# Patient Record
Sex: FEMALE | Race: BLACK OR AFRICAN AMERICAN | NOT HISPANIC OR LATINO | Employment: UNEMPLOYED
[De-identification: names, ages, dates, MRNs, and addresses within clinical notes are randomized per-mention and may not be internally consistent; named-entity substitution may affect disease eponyms.]

---
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
## 2022-02-08 ENCOUNTER — E-VISIT (OUTPATIENT)
Dept: FAMILY MEDICINE | Facility: CLINIC | Age: 22
End: 2022-02-08
Payer: COMMERCIAL

## 2022-02-08 DIAGNOSIS — J02.9 SORE THROAT: ICD-10-CM

## 2022-02-08 DIAGNOSIS — Z20.822 SUSPECTED COVID-19 VIRUS INFECTION: ICD-10-CM

## 2022-02-08 PROCEDURE — 99207 PR NO CHARGE LOS: CPT | Performed by: FAMILY MEDICINE

## 2022-02-09 PROBLEM — J30.1 SEASONAL ALLERGIC RHINITIS DUE TO POLLEN: Status: ACTIVE | Noted: 2017-06-28

## 2022-02-09 PROBLEM — F33.1 MODERATE EPISODE OF RECURRENT MAJOR DEPRESSIVE DISORDER (H): Status: ACTIVE | Noted: 2020-04-15

## 2022-02-09 PROBLEM — F32.2 CURRENT SEVERE EPISODE OF MAJOR DEPRESSIVE DISORDER WITHOUT PSYCHOTIC FEATURES (H): Status: ACTIVE | Noted: 2019-01-10

## 2022-02-09 RX ORDER — ARIPIPRAZOLE 20 MG/1
20 TABLET ORAL DAILY
COMMUNITY
Start: 2021-05-25 | End: 2022-04-26

## 2022-02-09 RX ORDER — OMEPRAZOLE 20 MG/1
TABLET, DELAYED RELEASE ORAL
COMMUNITY
End: 2022-10-27

## 2022-02-09 RX ORDER — LAMOTRIGINE 150 MG/1
150 TABLET ORAL DAILY
COMMUNITY
Start: 2021-07-30 | End: 2022-04-26

## 2022-02-09 RX ORDER — ATOMOXETINE 40 MG/1
40 CAPSULE ORAL EVERY MORNING
COMMUNITY
Start: 2021-10-19 | End: 2022-04-26

## 2022-02-09 RX ORDER — CETIRIZINE HYDROCHLORIDE 10 MG/1
10 TABLET ORAL DAILY
Qty: 30 TABLET | Refills: 0 | Status: SHIPPED | OUTPATIENT
Start: 2022-02-09 | End: 2022-04-26

## 2022-02-09 RX ORDER — FLUTICASONE PROPIONATE 50 MCG
2 SPRAY, SUSPENSION (ML) NASAL DAILY
Qty: 10 ML | Refills: 0 | Status: SHIPPED | OUTPATIENT
Start: 2022-02-09 | End: 2022-04-26

## 2022-02-09 NOTE — PATIENT INSTRUCTIONS
Z,    Your symptoms show that you may have coronavirus (COVID-19). This illness can cause fever, cough and trouble breathing. Many people get a mild case and get better on their own. Some people can get very sick.    Because you reported additional symptoms, I would like to also test you for flu,covid and strep.    What should I do?  We would like to test you for COVID-19 virus and Strep and Flu. I have placed orders for these tests. To schedule: go to your [employer] home page and scroll down to the section that says  You have an appointment that needs to be scheduled  and click the large green button that says  Schedule Now  and follow the steps to find the next available openings. It is important that when you are asked what the reason for your appointment is that you mention you need BOTH COVID and Flu/strep tests.    If you are unable to complete these [employer] scheduling steps, please call [phone] to schedule your testing.     Return to work/school/ guidance:   Please let your workplace manager and staffing office know when your isolation ends.       If you receive a positive COVID-19 test result, follow the guidance of the those who are giving you the results. Usually the return to work is 10 days from symptom onset or positive test date, whichever comes first (or in some cases 20 days if you are immunocompromised). If your symptoms started after your positive test, the 10 days should start when your symptoms started.   o If you work at [facility], you must also be cleared by Employee Occupational Health and Safety to return to work.      If you receive a negative COVID-19 test result and did not have a high risk exposure to someone with a known positive COVID-19 test, you can return to work once you're free of fever for 24 hours without fever-reducing medication and your symptoms are improving or resolved.    If you receive a negative COVID-19 test and if you had a high risk exposure to  someone who has tested positive for COVID-19 then you can return to work 14 days after your last contact with the positive individual. Follow quarantine guidance given by your doctor or public health officials.    Sign up for [employer].   We know it's scary to hear that you might have COVID-19. We want to track your symptoms to make sure you're okay over the next 2 weeks. Please look for an email from [employer]--this is a free, online program that we'll use to keep in touch. To sign up, follow the link in the email you will receive. Learn more at [email or web address]    How can I take care of myself?  Over the counter medications may help with your symptoms like congestion, cough, chills, or fever. I have sent in a prescription for Zyrtec and flonase.    There are not many effective prescription treatments for early COVID-19. Hydroxychloroquine, ivermectin, and azithromycin are not effective or recommended for COVID-19.    If your symptoms started in the last 10 days, you may be able to receive a treatment with monoclonal antibodies. This treatment can lower your risk of severe illness and going to the hospital. It is given through an IV or under your skin (subcutaneous) and must be given at an infusion center. You must be 12 or older, weight at least 88 pounds, and have a positive COVID-19 test.      If you would like to sign up to be considered to receive the monoclonal antibody medicine, please complete a participation form through the [facility] here:  MNRAP ([email or web address].[facility].mn.us/diseases/coronavirus/mnrap.html). You may also call the [facility] COVID-19 Public Hotline at 1-501.233.4557 (open Mon-Fri: 9am-7pm and Sat: 10am-6pm).     Not all people who are eligible will receive the medicine, since supply is limited. You will be contacted in the next 1 to 2 business days only if you are selected. If you do not receive a call, you have not been selected to receive the  medicine. If you have any questions about this medication, please contact your primary care provider. For more information, see [email or web address].[facility].[email or web address]/diseases/[email or web address]/[email or web address]      Get lots of rest. Drink extra fluids (unless a doctor has told you not to)    Take Tylenol (acetaminophen) or ibuprofen for fever or pain. If you have liver or kidney problems, ask your family doctor if it's okay to take Tylenol or ibuprofen    Take over the counter medications for your symptoms, as directed by your doctor. You may also talk to your pharmacist.      If you have other health problems (like cancer, heart failure, an organ transplant or severe kidney disease): Call your specialty clinic if you don't feel better in the next 2 days.    Know when to call 911. Emergency warning signs include:  o Trouble breathing or shortness of breath  o Pain or pressure in the chest that doesn't go away  o Feeling confused like you haven't felt before, or not being able to wake up  o Bluish-colored lips or face    Where can I get more information?    [facility] - About COVID-19: [email or web address]     CDC - What to Do If You're Sick:   www.cdc.gov/coronavirus/2019-ncov/about/steps-when-sick.html    CDC - Ending Home Isolation:  https://www.cdc.gov/coronavirus/2019-ncov/your-health/quarantine-isolation.html    CDC - Caring for Someone:  www.cdc.gov/coronavirus/2019-ncov/if-you-are-sick/care-for-someone.html    [facility] clinical trials (COVID-19 research studies): clinicalaffairs.[facility]/n-clinical-trials    Below are the COVID-19 hotlines at the [facility] ([facility]). Interpreters are available.  o For health questions: Call 563-941-7905 or 1-451.292.1521 (7 a.m. to 7 p.m.)  o For questions about schools and childcare: Call 782-153-7344 or 1-730.875.7851 (7 a.m. to 7 p.m.)  Z,    Your symptoms show that you may have coronavirus (COVID-19). This illness can cause fever, cough and  trouble breathing. Many people get a mild case and get better on their own. Some people can get very sick.    Because you also reported sore throat, I would like to also test you for Strep Throat to determine if we need to treat you for that as well.    What should I do?  We would like to test you for COVID-19 virus and Strep Throat. I have placed orders for these tests. To schedule: go to your Se[employer] home page and scroll down to the section that says  You have an appointment that needs to be scheduled  and click the large green button that says  Schedule Now  and follow the steps to find the next available openings. It is important that when you are asked what the reason for your appointment is that you mention you need BOTH COVID and Strep tests.    If you are unable to complete these Seafile scheduling steps, please call [phone] to schedule your testing.     Return to work/school/ guidance:   Please let your workplace manager and staffing office know when your isolation ends.     If you receive a positive COVID-19 test result, follow the guidance of the those who are giving you the results. Usually the return to work is 10 days from symptom onset or positive test date (or in some cases 20 days if you are immunocompromised). If your symptoms started after your positive test, the 10 days should start when your symptoms started.   If you work at [facility], you must also be cleared by Employee Occupational Health and Safety to return to work.    If you receive a negative COVID-19 test result and did not have a high risk exposure to someone with a known positive COVID-19 test, you can return to work once you're free of fever for 24 hours without fever-reducing medication and your symptoms are improving or resolved.  If you receive a negative COVID-19 test and if you had a high risk exposure to someone who has tested positive for COVID-19 then you can return to work 14 days after your last contact  with the positive individual. Follow quarantine guidance given by your doctor or public health officials.    Sign up for [employer].   We know it's scary to hear that you might have COVID-19. We want to track your symptoms to make sure you're okay over the next 2 weeks. Please look for an email from [employer]--this is a free, online program that we'll use to keep in touch. To sign up, follow the link in the email you will receive. Learn more at [email or web address]    How can I take care of myself?  Over the counter medications may help with your symptoms like congestion, cough, chills, or fever. I have sent in a prescription for Zyrtec and Flonase.    There are not many effective prescription treatments for early COVID-19. Hydroxychloroquine, ivermectin, and azithromycin are not effective or recommended for COVID-19.    If your symptoms started in the last 10 days, you may be able to receive a treatment with monoclonal antibodies. This treatment can lower your risk of severe illness and going to the hospital. It is given through an IV or under your skin (subcutaneous) and must be given at an infusion center. You must be 12 or older, weight at least 88 pounds, and have a positive COVID-19 test.      If you would like to sign up to be considered to receive the monoclonal antibody medicine, please complete a participation form through the [facility] here: MNRAP (https://www.health.[facility].mn.us/diseases/coronavirus/mnrap.html). You may also call the [facility] COVID-19 Public Hotline at 1-684.511.6543 (open Mon-Fri: 9am-7pm and Sat: 10am-6pm).     Not all people who are eligible will receive the medicine, since supply is limited. You will be contacted in the next 1 to 2 business days only if you are selected. If you do not receive a call, you have not been selected to receive the medicine. If you have any questions about this medication, please contact your primary care provider. For more  information, see [email or web address].[facility].mn.us/diseases/[email or web address]/[email or web address]    Get lots of rest. Drink extra fluids (unless a doctor has told you not to)  Take Tylenol (acetaminophen) or ibuprofen for fever or pain. If you have liver or kidney problems, ask your family doctor if it's okay to take Tylenol or ibuprofen  Take over the counter medications for your symptoms, as directed by your doctor. You may also talk to your pharmacist.    If you have other health problems (like cancer, heart failure, an organ transplant or severe kidney disease): Call your specialty clinic if you don't feel better in the next 2 days.  Know when to call 911. Emergency warning signs include:  Trouble breathing or shortness of breath  Pain or pressure in the chest that doesn't go away  Feeling confused like you haven't felt before, or not being able to wake up  Bluish-colored lips or face    Where can I get more information?  [facility] - About COVID-19: [email or web address]   CDC - What to Do If You're Sick:   www.cdc.gov/coronavirus/2019-ncov/about/steps-when-sick.html  CDC - Ending Home Isolation:  https://www.cdc.gov/coronavirus/2019-ncov/your-health/quarantine-isolation.html  CDC - Caring for Someone:  www.cdc.gov/coronavirus/2019-ncov/if-you-are-sick/care-for-someone.html  [facility] clinical trials (COVID-19 research studies): clinicalaffairs.[facility]-clinical-trials  Below are the COVID-19 hotlines at the [facility] ([facility]). Interpreters are available.  For health questions: Call 007-566-1799 or 1-282.484.4335 (7 a.m. to 7 p.m.)  For questions about schools and childcare: Call 937-696-1630 or 1-481.565.7457 (7 a.m. to 7 p.m.)

## 2022-02-10 ENCOUNTER — LAB (OUTPATIENT)
Dept: FAMILY MEDICINE | Facility: CLINIC | Age: 22
End: 2022-02-10
Attending: PHYSICIAN ASSISTANT
Payer: COMMERCIAL

## 2022-02-10 DIAGNOSIS — Z20.822 SUSPECTED COVID-19 VIRUS INFECTION: ICD-10-CM

## 2022-02-10 DIAGNOSIS — J02.9 SORE THROAT: ICD-10-CM

## 2022-02-10 LAB
DEPRECATED S PYO AG THROAT QL EIA: NEGATIVE
FLUAV AG SPEC QL IA: NEGATIVE
FLUBV AG SPEC QL IA: NEGATIVE
GROUP A STREP BY PCR: NOT DETECTED

## 2022-02-10 PROCEDURE — U0005 INFEC AGEN DETEC AMPLI PROBE: HCPCS

## 2022-02-10 PROCEDURE — 87651 STREP A DNA AMP PROBE: CPT

## 2022-02-10 PROCEDURE — 87804 INFLUENZA ASSAY W/OPTIC: CPT

## 2022-02-10 PROCEDURE — U0003 INFECTIOUS AGENT DETECTION BY NUCLEIC ACID (DNA OR RNA); SEVERE ACUTE RESPIRATORY SYNDROME CORONAVIRUS 2 (SARS-COV-2) (CORONAVIRUS DISEASE [COVID-19]), AMPLIFIED PROBE TECHNIQUE, MAKING USE OF HIGH THROUGHPUT TECHNOLOGIES AS DESCRIBED BY CMS-2020-01-R: HCPCS

## 2022-02-11 LAB — SARS-COV-2 RNA RESP QL NAA+PROBE: NEGATIVE

## 2022-02-15 ENCOUNTER — ALLIED HEALTH/NURSE VISIT (OUTPATIENT)
Dept: FAMILY MEDICINE | Facility: CLINIC | Age: 22
End: 2022-02-15
Payer: COMMERCIAL

## 2022-02-15 DIAGNOSIS — Z30.42 ENCOUNTER FOR SURVEILLANCE OF INJECTABLE CONTRACEPTIVE: Primary | ICD-10-CM

## 2022-02-15 PROCEDURE — 96372 THER/PROPH/DIAG INJ SC/IM: CPT | Performed by: NURSE PRACTITIONER

## 2022-02-15 PROCEDURE — 99207 PR NO CHARGE NURSE ONLY: CPT | Performed by: NURSE PRACTITIONER

## 2022-02-15 PROCEDURE — 99207 PR NO CHARGE NURSE ONLY: CPT

## 2022-02-15 RX ADMIN — MEDROXYPROGESTERONE ACETATE 150 MG: 150 INJECTION, SUSPENSION INTRAMUSCULAR at 16:09

## 2022-02-15 NOTE — PROGRESS NOTES
Clinic Administered Medication Documentation    Administrations This Visit     medroxyPROGESTERone (DEPO-PROVERA) injection 150 mg     Admin Date  02/15/2022 Action  Given Dose  150 mg Route  Intramuscular Site  Left Deltoid Administered By  Coty Wagner CMA    Ordering Provider: Argelia Castaneda NP    Patient Supplied?: No                  Depo Provera Documentation    URINE HCG: not indicated    Depo-Provera Standing Order inclusion/exclusion criteria reviewed.   Patient meets: inclusion criteria     BP: Data Unavailable  LAST PAP/EXAM: No results found for: PAP    Prior to injection, verified patient identity using patient's name and date of birth. Medication was administered. Please see MAR and medication order for additional information.     Was entire vial of medication used? Yes  Vial/Syringe: Single dose vial  Expiration Date:  9/2023    Patient instructed to remain in clinic for 15 minutes and report any adverse reaction to staff immediately .  NEXT INJECTION DUE: 5/3/22 - 5/17/22    Coty Wagner CMA (AAMA)

## 2022-03-07 ENCOUNTER — E-VISIT (OUTPATIENT)
Dept: URGENT CARE | Facility: CLINIC | Age: 22
End: 2022-03-07
Payer: COMMERCIAL

## 2022-03-07 DIAGNOSIS — Z20.822 SUSPECTED COVID-19 VIRUS INFECTION: ICD-10-CM

## 2022-03-07 DIAGNOSIS — R05.9 COUGH: Primary | ICD-10-CM

## 2022-03-07 PROCEDURE — 99207 PR NO CHARGE LOS: CPT | Performed by: PHYSICIAN ASSISTANT

## 2022-03-08 NOTE — PATIENT INSTRUCTIONS
Z,    Your symptoms show that you may have coronavirus (COVID-19). This illness can cause fever, cough and trouble breathing. Many people get a mild case and get better on their own. Some people can get very sick.    Because you reported additional symptoms, I would like to also test you for influenza.    What should I do?  I have placed orders for these tests.   To schedule: go to your [employer] home page and scroll down to the section that says  You have an appointment that needs to be scheduled  and click the large green button that says  Schedule Now  and follow the steps to find the next available openings.     If you are unable to complete these [employer] scheduling steps, please call [phone] to schedule your testing.     These guidelines are for isolating before returning to work, school or .     For employers, schools and day cares: This is an official notice for this person s medical guidelines for returning in-person.     For health care sites: The [facility] gives different isolation and quarantine guidelines for healthcare sites, please check with these sites before arriving.     How do I self-isolate?  You isolate when you have symptoms of COVID or a test shows you have COVID, even if you don t have symptoms.     If you DO have symptoms:  o Stay home and away from others  - For at least 5 days after your symptoms started, AND   - You are fever free for 24 hours (with no medicine that reduces fever), AND  - Your other symptoms are better.  o Wear a mask for 10 full days any time you are around others.    If you DON T have symptoms:  o Stay at home and away from others for at least 5 days after your positive test.  o Wear a mask for 10 full days any time you are around others.    Sign up for [employer].   We know it's scary to hear that you might have COVID-19. We want to track your symptoms to make sure you're okay over the next 2 weeks. Please look for an email from [employer]--this is a free,  online program that we'll use to keep in touch. To sign up, follow the link in the email you will receive. Learn more at [email or web address]    How can I take care of myself?  Over the counter medications may help with your symptoms such as runny or stuffy nose, cough, chills, or fever. Talk to your care team about your options.     Some people are at high risk of severe illness (for example, you have a weak immune system, you re 65 years or older, or you have certain medical problems). If your risk is high and your symptoms started in the last 5 to 10 days, we strongly recommend for you to get COVID treatment as soon as possible. Paxlovid, Molnupiravir and the monoclonal antibody treatments are proven safe and effective, make you feel better faster, and prevent hospitalization and death.       To schedule an appointment to discuss COVID treatment, request an appointment on [employer] (select  COVID-19 Treatment ) or call [facility] ([phone]), press 7.      Get lots of rest. Drink extra fluids (unless a doctor has told you not to)    Take Tylenol (acetaminophen) or ibuprofen for fever or pain. If you have liver or kidney problems, ask your family doctor if it's okay to take Tylenol or ibuprofen    Take over the counter medications for your symptoms, as directed by your doctor. You may also talk to your pharmacist.      If you have other health problems (like cancer, heart failure, an organ transplant or severe kidney disease): Call your specialty clinic if you don't feel better in the next 2 days.    Know when to call 911. Emergency warning signs include:  o Trouble breathing or shortness of breath  o Pain or pressure in the chest that doesn't go away  o Feeling confused like you haven't felt before, or not being able to wake up  o Bluish-colored lips or face    Where can I get more information?     [facility] - About COVID-19: [email or web address]     CDC - What to Do If You're  Sick: https://www.cdc.gov/coronavirus/2019-ncov/if-you-are-sick/index.html     CDC - Quarantine & Isolation: https://www.cdc.gov/coronavirus/2019-ncov/your-health/quarantine-isolation.html     [facility] clinical trials (COVID-19 research studies): clinicalaffairs.[facility]-clinical-trials    Below are the COVID-19 hotlines at the [place] Department of Health ([facility]). Interpreters are available.  o For health questions: Call 145-197-6626 or 1-843.233.8044 (7 a.m. to 7 p.m.)  o For questions about schools and childcare: Call 524-760-4982 or 1-820.414.1432 (7 a.m. to 7 p.m.)  March 7, 2022  RE:  Regina Marshall                                                                                                                  [address] [facility] [address]      To whom it may concern:    I evaluated Regina Marshall on March 7, 2022. Regina Marshall should be excused from work/school.     They should let their workplace manager and staffing office know when their quarantine ends.    We can not give an exact date as it depends on the information below. They can calculate this on their own or work with their manager/staffing office to calculate this. (For example if they were exposed on 10/04, they would have to quarantine for 14 full days. That would be through 10/18. They could return on 10/19.)    Quarantine Guidelines:    If patient receives a positive COVID-19 test result, they should follow the guidance of those who are giving the results. Usually the return to work is 10 (or in some cases 20 days from symptom onset.) If they work at [employer], they must be cleared by Employee Occupational Health and Safety to return to work.      If patient receives a negative COVID-19 test result and did not have a high risk exposure to someone with a known positive COVID-19 test, they can return to work once they're free of fever for 24 hours without fever-reducing medication and their symptoms are  improving or resolved.    If patient receives a negative COVID-19 test and if they had a high risk exposure to someone who has tested positive for COVID-19 then they can return to work 14 days after their last contact with the positive individual    Note: If there is ongoing exposure to the covid positive person, this quarantine period may be longer than 14 days. (For example, if they are continually exposed to their child, who tested positive and cannot isolate from them, then the quarantine of 7-14 days can't start until their child is no longer contagious. This is typically 10 days from onset to the child's symptoms. So the total duration may be 17-24 days in this case.)     Sincerely,  Angie Florentino PA-C, NICO          o

## 2022-03-09 ENCOUNTER — E-VISIT (OUTPATIENT)
Dept: FAMILY MEDICINE | Facility: CLINIC | Age: 22
End: 2022-03-09
Payer: COMMERCIAL

## 2022-03-09 DIAGNOSIS — J02.0 STREPTOCOCCAL SORE THROAT: Primary | ICD-10-CM

## 2022-03-09 PROCEDURE — 99207 PR NO CHARGE LOS: CPT | Performed by: NURSE PRACTITIONER

## 2022-03-10 ENCOUNTER — ALLIED HEALTH/NURSE VISIT (OUTPATIENT)
Dept: FAMILY MEDICINE | Facility: CLINIC | Age: 22
End: 2022-03-10
Payer: COMMERCIAL

## 2022-03-10 DIAGNOSIS — J02.0 STREPTOCOCCAL SORE THROAT: ICD-10-CM

## 2022-03-10 DIAGNOSIS — J02.9 SORE THROAT: Primary | ICD-10-CM

## 2022-03-10 PROCEDURE — U0005 INFEC AGEN DETEC AMPLI PROBE: HCPCS

## 2022-03-10 PROCEDURE — 87651 STREP A DNA AMP PROBE: CPT

## 2022-03-10 PROCEDURE — U0003 INFECTIOUS AGENT DETECTION BY NUCLEIC ACID (DNA OR RNA); SEVERE ACUTE RESPIRATORY SYNDROME CORONAVIRUS 2 (SARS-COV-2) (CORONAVIRUS DISEASE [COVID-19]), AMPLIFIED PROBE TECHNIQUE, MAKING USE OF HIGH THROUGHPUT TECHNOLOGIES AS DESCRIBED BY CMS-2020-01-R: HCPCS

## 2022-03-10 PROCEDURE — 99207 PR NO CHARGE NURSE ONLY: CPT

## 2022-03-10 PROCEDURE — 87804 INFLUENZA ASSAY W/OPTIC: CPT

## 2022-03-10 NOTE — TELEPHONE ENCOUNTER
Provider E-Visit time total (minutes): test for strep, flu covid during current pandemic. Argelia Castaneda, CNP

## 2022-03-10 NOTE — PATIENT INSTRUCTIONS
Thank you for choosing us for your care. Given your symptoms, I would like you to do a lab-only visit to determine what is causing them.  I have placed the orders.  Please schedule an appointment with the lab right here in [email or web address], or call [phone].  I will let you know when the results are back and next steps to take.

## 2022-03-11 LAB — SARS-COV-2 RNA RESP QL NAA+PROBE: NEGATIVE

## 2022-03-16 DIAGNOSIS — J30.1 SEASONAL ALLERGIC RHINITIS DUE TO POLLEN: Primary | ICD-10-CM

## 2022-03-17 RX ORDER — CETIRIZINE HYDROCHLORIDE 10 MG/1
TABLET ORAL
Qty: 30 TABLET | Refills: 0 | Status: SHIPPED | OUTPATIENT
Start: 2022-03-17 | End: 2022-04-26

## 2022-04-17 DIAGNOSIS — J30.1 SEASONAL ALLERGIC RHINITIS DUE TO POLLEN: Primary | ICD-10-CM

## 2022-04-19 RX ORDER — CETIRIZINE HYDROCHLORIDE 10 MG/1
TABLET ORAL
Qty: 30 TABLET | Refills: 5 | Status: SHIPPED | OUTPATIENT
Start: 2022-04-19 | End: 2022-04-26

## 2022-04-26 ENCOUNTER — OFFICE VISIT (OUTPATIENT)
Dept: FAMILY MEDICINE | Facility: CLINIC | Age: 22
End: 2022-04-26
Payer: COMMERCIAL

## 2022-04-26 VITALS
DIASTOLIC BLOOD PRESSURE: 68 MMHG | HEART RATE: 110 BPM | WEIGHT: 262 LBS | TEMPERATURE: 98.4 F | OXYGEN SATURATION: 100 % | HEIGHT: 63 IN | SYSTOLIC BLOOD PRESSURE: 116 MMHG | RESPIRATION RATE: 18 BRPM | BODY MASS INDEX: 46.42 KG/M2

## 2022-04-26 DIAGNOSIS — K59.01 SLOW TRANSIT CONSTIPATION: ICD-10-CM

## 2022-04-26 DIAGNOSIS — N39.44 NOCTURNAL ENURESIS: Primary | ICD-10-CM

## 2022-04-26 LAB
ALBUMIN UR-MCNC: NEGATIVE MG/DL
APPEARANCE UR: ABNORMAL
BACTERIA #/AREA URNS HPF: ABNORMAL /HPF
BILIRUB UR QL STRIP: NEGATIVE
COLOR UR AUTO: YELLOW
GLUCOSE UR STRIP-MCNC: NEGATIVE MG/DL
HGB UR QL STRIP: NEGATIVE
HYALINE CASTS: 1 /LPF
KETONES UR STRIP-MCNC: NEGATIVE MG/DL
LEUKOCYTE ESTERASE UR QL STRIP: NEGATIVE
MUCOUS THREADS #/AREA URNS LPF: PRESENT /LPF
NITRATE UR QL: NEGATIVE
PH UR STRIP: 6 [PH] (ref 5–7)
RBC URINE: <1 /HPF
SP GR UR STRIP: 1.02 (ref 1–1.03)
SQUAMOUS EPITHELIAL: 1 /HPF
UROBILINOGEN UR STRIP-MCNC: 2 MG/DL
WBC URINE: 1 /HPF

## 2022-04-26 PROCEDURE — 99214 OFFICE O/P EST MOD 30 MIN: CPT | Performed by: NURSE PRACTITIONER

## 2022-04-26 PROCEDURE — 81001 URINALYSIS AUTO W/SCOPE: CPT | Performed by: NURSE PRACTITIONER

## 2022-04-26 RX ORDER — MIRTAZAPINE 15 MG/1
15 TABLET, FILM COATED ORAL AT BEDTIME
COMMUNITY
End: 2024-05-21 | Stop reason: DRUGHIGH

## 2022-04-26 RX ORDER — ARIPIPRAZOLE 15 MG/1
15 TABLET ORAL DAILY
COMMUNITY

## 2022-04-26 RX ORDER — SERTRALINE HYDROCHLORIDE 100 MG/1
100 TABLET, FILM COATED ORAL DAILY
COMMUNITY

## 2022-04-26 ASSESSMENT — ANXIETY QUESTIONNAIRES
3. WORRYING TOO MUCH ABOUT DIFFERENT THINGS: NEARLY EVERY DAY
2. NOT BEING ABLE TO STOP OR CONTROL WORRYING: NEARLY EVERY DAY
GAD7 TOTAL SCORE: 15
GAD7 TOTAL SCORE: 15
6. BECOMING EASILY ANNOYED OR IRRITABLE: MORE THAN HALF THE DAYS
5. BEING SO RESTLESS THAT IT IS HARD TO SIT STILL: SEVERAL DAYS
7. FEELING AFRAID AS IF SOMETHING AWFUL MIGHT HAPPEN: NEARLY EVERY DAY
GAD7 TOTAL SCORE: 15
7. FEELING AFRAID AS IF SOMETHING AWFUL MIGHT HAPPEN: NEARLY EVERY DAY
1. FEELING NERVOUS, ANXIOUS, OR ON EDGE: MORE THAN HALF THE DAYS
4. TROUBLE RELAXING: SEVERAL DAYS

## 2022-04-26 ASSESSMENT — PATIENT HEALTH QUESTIONNAIRE - PHQ9
SUM OF ALL RESPONSES TO PHQ QUESTIONS 1-9: 19
SUM OF ALL RESPONSES TO PHQ QUESTIONS 1-9: 19
10. IF YOU CHECKED OFF ANY PROBLEMS, HOW DIFFICULT HAVE THESE PROBLEMS MADE IT FOR YOU TO DO YOUR WORK, TAKE CARE OF THINGS AT HOME, OR GET ALONG WITH OTHER PEOPLE: VERY DIFFICULT

## 2022-04-26 ASSESSMENT — PAIN SCALES - GENERAL: PAINLEVEL: SEVERE PAIN (6)

## 2022-04-26 NOTE — PROGRESS NOTES
Assessment & Plan     Nocturnal enuresis  Patient with nocturnal enuresis for years.  Has improved with stopping trazodone.  UA is normal today.  Will refer for pelvic floor therapy, treat constipation, and referral Urology  - Adult Urology Referral; Future  - Physical Therapy Referral; Future  - UA Macro with Reflex to Micro and Culture - lab collect; Future  - UA Macro with Reflex to Micro and Culture - lab collect    Slow transit constipation  Discussed fluids, fiber, miralax- pelvic floor therapy.  Could also trial squatty potty.   - Physical Therapy Referral; Future    Mental Health- feels a lot of abdominal pain with her anxiety.  Medications managed by Psychiatry and are complex.  May benefit from pamelor or elavil if appropriate and foster mother will bring up at their next appointment.      35 minutes spent on the date of the encounter doing chart review, review of test results, interpretation of tests, patient visit, documentation and discussion with family        Depression Screening Follow Up    PHQ 4/26/2022   PHQ-9 Total Score 19   Q9: Thoughts of better off dead/self-harm past 2 weeks Several days   F/U: Thoughts of suicide or self-harm Yes   F/U: Self harm-plan Yes   F/U: Self-harm action Yes   F/U: Safety concerns No     Last PHQ-9 4/26/2022   1.  Little interest or pleasure in doing things 1   2.  Feeling down, depressed, or hopeless 2   3.  Trouble falling or staying asleep, or sleeping too much 2   4.  Feeling tired or having little energy 3   5.  Poor appetite or overeating 3   6.  Feeling bad about yourself 3   7.  Trouble concentrating 3   8.  Moving slowly or restless 1   Q9: Thoughts of better off dead/self-harm past 2 weeks 1   PHQ-9 Total Score 19   Difficulty at work, home, or with people -   In the past two weeks have you had thoughts of suicide or self harm? Yes   Do you have concerns about your personal safety or the safety of others? No   In the past 2 weeks have you thought about a  plan or had intention to harm yourself? Yes   In the past 2 weeks have you acted on these thoughts in any way? Yes         Return in about 6 months (around 10/26/2022).    Argelia Castaneda NP  [facility] NIKA MCKNIGHT is a 22 year old who presents for the following health issues with her foster mother Erna    History of Present Illness       Mental Health Follow-up:  Patient presents to follow-up on Depression & Anxiety.Patient's depression since last visit has been:  Medium  The patient is having other symptoms associated with depression.  Patient's anxiety since last visit has been:  Medium  The patient is having other symptoms associated with anxiety.  Any significant life events: job concerns, financial concerns and health concerns  Patient is feeling anxious or having panic attacks.  Patient has no concerns about alcohol or drug use.       Today's PHQ-9         PHQ-9 Total Score: 19  PHQ-9 Q9 Thoughts of better off dead/self-harm past 2 weeks :   (P) Several days  Thoughts of suicide or self harm: (P) Yes  Self-harm Plan:   (P) Yes  Self-harm Action:     (P) Yes  Safety concerns for self or others: (P) No    How difficult have these problems made it for you to do your work, take care of things at home, or get along with other people: Very difficult    Today's SHERRILL-7 Score: 15    Reason for visit:  Bloodwork possible diabetes need check up for stomach issues and bed wetting and daytime drowsiness  Symptom onset:  More than a month  Symptoms include:  Moderate and severe  Symptom intensity:  Moderate  Symptom progression:  Staying the same  Had these symptoms before:  Yes  Has tried/received treatment for these symptoms:  Yes  Previous treatment was successful:  No  What makes it worse:  Eating. Anxiety. Stress  What makes it better:  Less stress    She eats 0-1 servings of fruits and vegetables daily.She consumes 2 sweetened beverage(s) daily.She exercises with enough effort to  "increase her heart rate 9 or less minutes per day.  She exercises with enough effort to increase her heart rate 3 or less days per week. She is missing 1 dose(s) of medications per week.       Concerned about Sleep, stomach issues, and some bed wetting.  Here with Erna her foster Mom.      For over two years.  She feels she holds her anxiety in her stomach- feels in stomach with nervous- with bowel movements will be in there for over an hour.      Has had bed wetting- has sometimes where there is spotting.  Foster mom goes in to smell blankets to make sure and has washing and     Trazodone- stopped that- not full on soaked.  Now with some spots- wears two depends every night.  There are times where she forgets depends and will have small accidents.  Has had bed wetting for years.  Tried decreasing fluids at night.  Stopping trazodone has helped.      Bowel movements every day- every other day.  Will be in there for an hour- tried miralax and then would have diarrhea    Sees Psychiatry for mental health      Review of Systems   Constitutional, HEENT, cardiovascular, pulmonary, GI, , musculoskeletal, neuro, skin, endocrine and psych systems are negative, except as otherwise noted.      Objective    /68   Pulse 110   Temp 98.4  F (36.9  C) (Temporal)   Resp 18   Ht 1.6 m (5' 3\")   Wt 118.8 kg (262 lb)   SpO2 100%   Breastfeeding No   BMI 46.41 kg/m    Body mass index is 46.41 kg/m .  Physical Exam   GENERAL: healthy, alert and no distress  NECK: no adenopathy, no asymmetry, masses, or scars and thyroid normal to palpation  RESP: lungs clear to auscultation - no rales, rhonchi or wheezes  CV: regular rate and rhythm, normal S1 S2, no S3 or S4, no murmur, click or rub, no peripheral edema and peripheral pulses strong  ABDOMEN: soft, nontender, no hepatosplenomegaly, no masses and bowel sounds normal  MS: no gross musculoskeletal defects noted, no edema    Results for orders placed or performed in visit " on 04/26/22 (from the past 24 hour(s))   UA Macro with Reflex to Micro and Culture - lab collect    Specimen: Urine, Midstream   Result Value Ref Range    Color Urine Yellow Colorless, Straw, Light Yellow, Yellow    Appearance Urine Slightly Cloudy (A) Clear    Glucose Urine Negative Negative mg/dL    Bilirubin Urine Negative Negative    Ketones Urine Negative Negative mg/dL    Specific Gravity Urine 1.025 1.003 - 1.035    Blood Urine Negative Negative    pH Urine 6.0 5.0 - 7.0    Protein Albumin Urine Negative Negative mg/dL    Urobilinogen Urine 2.0 Normal, 2.0 mg/dL    Nitrite Urine Negative Negative    Leukocyte Esterase Urine Negative Negative    Bacteria Urine Few (A) None Seen /HPF    Mucus Urine Present (A) None Seen /LPF    RBC Urine <1 <=2 /HPF    WBC Urine 1 <=5 /HPF    Squamous Epithelials Urine 1 <=1 /HPF    Hyaline Casts Urine 1 <=2 /LPF    Narrative    Urine Culture not indicated

## 2022-04-26 NOTE — PATIENT INSTRUCTIONS
Use the miralax for constipation-   Try a stool to help with pelvis    Ask Psychiatrist about elavil or pamelor would be helpful for abdominal pain    Keep a diary of bowel movements, incontinence, pain/ gas    Urology- [phone]

## 2022-04-27 ASSESSMENT — ANXIETY QUESTIONNAIRES: GAD7 TOTAL SCORE: 15

## 2022-04-27 ASSESSMENT — PATIENT HEALTH QUESTIONNAIRE - PHQ9: SUM OF ALL RESPONSES TO PHQ QUESTIONS 1-9: 19

## 2022-05-04 ENCOUNTER — E-VISIT (OUTPATIENT)
Dept: FAMILY MEDICINE | Facility: CLINIC | Age: 22
End: 2022-05-04
Payer: COMMERCIAL

## 2022-05-04 ENCOUNTER — ALLIED HEALTH/NURSE VISIT (OUTPATIENT)
Dept: FAMILY MEDICINE | Facility: CLINIC | Age: 22
End: 2022-05-04
Payer: COMMERCIAL

## 2022-05-04 DIAGNOSIS — R09.82 ALLERGIC RHINITIS WITH POSTNASAL DRIP: Primary | ICD-10-CM

## 2022-05-04 DIAGNOSIS — Z30.42 ENCOUNTER FOR SURVEILLANCE OF INJECTABLE CONTRACEPTIVE: Primary | ICD-10-CM

## 2022-05-04 DIAGNOSIS — J30.9 ALLERGIC RHINITIS WITH POSTNASAL DRIP: Primary | ICD-10-CM

## 2022-05-04 PROCEDURE — 96372 THER/PROPH/DIAG INJ SC/IM: CPT | Performed by: NURSE PRACTITIONER

## 2022-05-04 PROCEDURE — 99207 PR NO CHARGE LOS: CPT | Performed by: FAMILY MEDICINE

## 2022-05-04 PROCEDURE — 99207 PR NO CHARGE NURSE ONLY: CPT

## 2022-05-04 PROCEDURE — 99207 PR NO CHARGE NURSE ONLY: CPT | Performed by: NURSE PRACTITIONER

## 2022-05-04 RX ADMIN — MEDROXYPROGESTERONE ACETATE 150 MG: 150 INJECTION, SUSPENSION INTRAMUSCULAR at 15:14

## 2022-05-04 NOTE — PROGRESS NOTES
Clinic Administered Medication Documentation    Administrations This Visit     medroxyPROGESTERone (DEPO-PROVERA) injection 150 mg     Admin Date  05/04/2022 Action  Given Dose  150 mg Route  Intramuscular Site  Right Deltoid Administered By  Kathy Romero CMA    Ordering Provider: Argelia Castaneda NP    Patient Supplied?: No                  Depo Provera Documentation    URINE HCG: not indicated    Depo-Provera Standing Order inclusion/exclusion criteria reviewed.   Patient meets: inclusion criteria     BP: Data Unavailable  LAST PAP/EXAM: No results found for: PAP    Prior to injection, verified patient identity using patient's name and date of birth. Medication was administered. Please see MAR and medication order for additional information.     Was entire vial of medication used? Yes  Vial/Syringe: Single dose vial  Expiration Date:  01/2024    Patient instructed to remain in clinic for 15 minutes.  NEXT INJECTION DUE: 7/20/22 - 8/3/22    Kathy Romero CMA

## 2022-05-06 PROBLEM — J30.89 ALLERGIC RHINITIS DUE TO DUST MITE: Status: ACTIVE | Noted: 2017-06-28

## 2022-05-06 RX ORDER — FLUTICASONE PROPIONATE 50 MCG
1 SPRAY, SUSPENSION (ML) NASAL DAILY
Qty: 18.2 ML | Refills: 0 | Status: SHIPPED | OUTPATIENT
Start: 2022-05-06 | End: 2023-07-31

## 2022-05-06 NOTE — PATIENT INSTRUCTIONS
Dear Regina Marshall    Based on your symptoms, you are more likely having allergy versus upper respiratory infection.  I recommend to continue with the Zyrtec 10 mg daily.  Also may take Benadryl at night as needed for nasal congestion, runny nose or coughing.  I also started you on the Flonase which is a nasal spray to be used once a day until your symptoms resolve.  Be sure to drink a lot of water.  If you are not feeling any better or gets worse in the next 4-5 days, please call in or follow-up.  Please let me know if you need further question.  Thank you    Thanks for choosing us as your health care partner,    Francisco Javier Aguilera Mai, MD

## 2022-05-24 DIAGNOSIS — R09.82 ALLERGIC RHINITIS WITH POSTNASAL DRIP: Primary | ICD-10-CM

## 2022-05-24 DIAGNOSIS — J30.9 ALLERGIC RHINITIS WITH POSTNASAL DRIP: Primary | ICD-10-CM

## 2022-05-25 RX ORDER — CETIRIZINE HYDROCHLORIDE 10 MG/1
10 TABLET ORAL DAILY
Qty: 90 TABLET | Refills: 3 | Status: SHIPPED | OUTPATIENT
Start: 2022-05-25 | End: 2023-06-13

## 2022-05-31 ENCOUNTER — OFFICE VISIT (OUTPATIENT)
Dept: UROLOGY | Facility: CLINIC | Age: 22
End: 2022-05-31
Payer: COMMERCIAL

## 2022-05-31 VITALS
BODY MASS INDEX: 48.07 KG/M2 | HEIGHT: 63 IN | SYSTOLIC BLOOD PRESSURE: 116 MMHG | DIASTOLIC BLOOD PRESSURE: 66 MMHG | WEIGHT: 271.3 LBS

## 2022-05-31 DIAGNOSIS — N39.44 NOCTURNAL ENURESIS: ICD-10-CM

## 2022-05-31 LAB
ALBUMIN UR-MCNC: NEGATIVE MG/DL
APPEARANCE UR: ABNORMAL
BACTERIA #/AREA URNS HPF: ABNORMAL /HPF
BILIRUB UR QL STRIP: NEGATIVE
COLOR UR AUTO: YELLOW
GLUCOSE UR STRIP-MCNC: NEGATIVE MG/DL
HGB UR QL STRIP: NEGATIVE
KETONES UR STRIP-MCNC: 5 MG/DL
LEUKOCYTE ESTERASE UR QL STRIP: ABNORMAL
MUCOUS THREADS #/AREA URNS LPF: PRESENT /LPF
NITRATE UR QL: NEGATIVE
PH UR STRIP: 5 [PH] (ref 5–7)
RBC URINE: 0 /HPF
SP GR UR STRIP: 1.03 (ref 1–1.03)
SQUAMOUS EPITHELIAL: 8 /HPF
UROBILINOGEN UR STRIP-MCNC: NORMAL MG/DL
WBC URINE: 4 /HPF

## 2022-05-31 PROCEDURE — 81001 URINALYSIS AUTO W/SCOPE: CPT | Performed by: UROLOGY

## 2022-05-31 PROCEDURE — 99205 OFFICE O/P NEW HI 60 MIN: CPT | Performed by: UROLOGY
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
## 2022-06-27 ENCOUNTER — MYC MEDICAL ADVICE (OUTPATIENT)
Dept: FAMILY MEDICINE | Facility: CLINIC | Age: 22
End: 2022-06-27

## 2022-06-27 NOTE — TELEPHONE ENCOUNTER
I will route to Argelia Castaneda to advise if pt needs an appointment to discuss her concerns. Emma Lund, CMA

## 2022-06-27 NOTE — TELEPHONE ENCOUNTER
I have contacted the pt with the message below. She will call back to schedule as she is currently at the vet. Emma Lund, CMA

## 2022-06-29 ENCOUNTER — TELEPHONE (OUTPATIENT)
Dept: FAMILY MEDICINE | Facility: CLINIC | Age: 22
End: 2022-06-29

## 2022-06-29 NOTE — TELEPHONE ENCOUNTER
Patient is calling regarding a referral that we should have received from [facility] for additional testing. RN is unsure what this was and advised patient to connect with [facility] to see what they are specifically wanting or if [facility] can send us whatever it is again. Patient was going to contact [facility].       Also patient is wondering about appointment for change of birth control and allergy testing. PCPs soonest appointment is beginning of August. Patient is wondering what she should do considering she is due to get Depo on 8/3.     PCP has a virtual appointment available on 8/1 Otherwise, in person appointments on 8/4. Would patient be ok to cancel depo shot on 8/3 and see PCP on 8/4?    VICENTA Porras, RN

## 2022-06-30 DIAGNOSIS — Z79.899 HIGH RISK MEDICATION USE: Primary | ICD-10-CM

## 2022-07-01 NOTE — TELEPHONE ENCOUNTER
I have scheduled the pt for 7/18/2022 at 10:10 am. I have attempted to contact her to see if this would work. I left a message for her to return my call. [employer] message also sent. Emma Lund, [facility]

## 2022-07-07 ENCOUNTER — LAB (OUTPATIENT)
Dept: LAB | Facility: CLINIC | Age: 22
End: 2022-07-07
Payer: COMMERCIAL

## 2022-07-07 DIAGNOSIS — Z79.899 HIGH RISK MEDICATION USE: ICD-10-CM

## 2022-07-07 LAB
CHOLEST SERPL-MCNC: 191 MG/DL
FASTING STATUS PATIENT QL REPORTED: YES
FASTING STATUS PATIENT QL REPORTED: YES
GLUCOSE BLD-MCNC: 89 MG/DL (ref 70–99)
HBA1C MFR BLD: 5.3 % (ref 0–5.6)
HDLC SERPL-MCNC: 74 MG/DL
LDLC SERPL CALC-MCNC: 102 MG/DL
Lab: NORMAL
NONHDLC SERPL-MCNC: 117 MG/DL
PERFORMING LABORATORY: NORMAL
SPECIMEN STATUS: NORMAL
TEST NAME: NORMAL
TRIGL SERPL-MCNC: 77 MG/DL

## 2022-07-07 PROCEDURE — 99000 SPECIMEN HANDLING OFFICE-LAB: CPT

## 2022-07-07 PROCEDURE — 36415 COLL VENOUS BLD VENIPUNCTURE: CPT

## 2022-07-07 PROCEDURE — 80061 LIPID PANEL: CPT

## 2022-07-07 PROCEDURE — 80307 DRUG TEST PRSMV CHEM ANLYZR: CPT | Mod: 90

## 2022-07-07 PROCEDURE — 83036 HEMOGLOBIN GLYCOSYLATED A1C: CPT

## 2022-07-07 PROCEDURE — 82947 ASSAY GLUCOSE BLOOD QUANT: CPT

## 2022-07-11 LAB — LABCORP INTERFACED MISCELLANEOUS TEST RESULT: NORMAL

## 2022-07-18 ENCOUNTER — OFFICE VISIT (OUTPATIENT)
Dept: FAMILY MEDICINE | Facility: CLINIC | Age: 22
End: 2022-07-18
Payer: COMMERCIAL

## 2022-07-18 VITALS
DIASTOLIC BLOOD PRESSURE: 80 MMHG | OXYGEN SATURATION: 98 % | BODY MASS INDEX: 48.18 KG/M2 | TEMPERATURE: 97.8 F | SYSTOLIC BLOOD PRESSURE: 110 MMHG | WEIGHT: 272 LBS

## 2022-07-18 DIAGNOSIS — Z30.09 GENERAL COUNSELING FOR PRESCRIPTION OF ORAL CONTRACEPTIVES: Primary | ICD-10-CM

## 2022-07-18 DIAGNOSIS — F33.41 RECURRENT MAJOR DEPRESSIVE DISORDER, IN PARTIAL REMISSION (H): ICD-10-CM

## 2022-07-18 DIAGNOSIS — J30.1 SEASONAL ALLERGIC RHINITIS DUE TO POLLEN: ICD-10-CM

## 2022-07-18 DIAGNOSIS — K21.9 GASTROESOPHAGEAL REFLUX DISEASE WITHOUT ESOPHAGITIS: ICD-10-CM

## 2022-07-18 PROCEDURE — 96127 BRIEF EMOTIONAL/BEHAV ASSMT: CPT | Performed by: NURSE PRACTITIONER

## 2022-07-18 PROCEDURE — 99214 OFFICE O/P EST MOD 30 MIN: CPT | Performed by: NURSE PRACTITIONER

## 2022-07-18 RX ORDER — MOMETASONE FUROATE MONOHYDRATE 50 UG/1
2 SPRAY, METERED NASAL DAILY
Qty: 17 G | Refills: 4 | Status: SHIPPED | OUTPATIENT
Start: 2022-07-18 | End: 2023-07-31

## 2022-07-18 RX ORDER — OMEPRAZOLE 40 MG/1
40 CAPSULE, DELAYED RELEASE ORAL DAILY
Qty: 60 CAPSULE | Refills: 0 | Status: SHIPPED | OUTPATIENT
Start: 2022-07-18 | End: 2022-09-16

## 2022-07-18 RX ORDER — NORGESTIMATE AND ETHINYL ESTRADIOL 0.25-0.035
1 KIT ORAL DAILY
Qty: 84 TABLET | Refills: 3 | Status: SHIPPED | OUTPATIENT
Start: 2022-07-18 | End: 2023-07-31

## 2022-07-18 ASSESSMENT — PATIENT HEALTH QUESTIONNAIRE - PHQ9
10. IF YOU CHECKED OFF ANY PROBLEMS, HOW DIFFICULT HAVE THESE PROBLEMS MADE IT FOR YOU TO DO YOUR WORK, TAKE CARE OF THINGS AT HOME, OR GET ALONG WITH OTHER PEOPLE: EXTREMELY DIFFICULT
SUM OF ALL RESPONSES TO PHQ QUESTIONS 1-9: 12
SUM OF ALL RESPONSES TO PHQ QUESTIONS 1-9: 12

## 2022-07-18 ASSESSMENT — PAIN SCALES - GENERAL: PAINLEVEL: MILD PAIN (3)

## 2022-07-18 NOTE — PROGRESS NOTES
"  Assessment & Plan     General counseling for prescription of oral contraceptives  Does not want to take depo any longer. We discussed all forms of birth control available in the [place] , including LARC therapy (IUD, Nexplanon), Depo Provera, OCPs, NuvaRing or the patch, condoms.  She is a non-smoker, no history of migraines or blood clots.     Reviewed potential side effects of OCP's including but not limited to thromboembolic events, hypertension, breakthrough bleeding, GI upset, and headaches.  Reviewed proper usage.  Reviewed use of back up contraception.   - norgestimate-ethinyl estradiol (ORTHO-CYCLEN) 0.25-35 MG-MCG tablet; Take 1 tablet by mouth daily for 84 days    Recurrent major depressive disorder, in partial remission (H)  Followed by Psychiatry.  Denies suicidal ideation  - REVIEW OF HEALTH MAINTENANCE PROTOCOL ORDERS    Seasonal allergic rhinitis due to pollen  On zyrtec.  flonase was not helpful.  Trial mometasone  - mometasone (NASONEX) 50 MCG/ACT nasal spray; Spray 2 sprays into both nostrils daily    Gastroesophageal reflux disease without esophagitis  Increase omeprazole.  Could also have some ibs component so will have her keep a symptom diary also  - omeprazole (PRILOSEC) 40 MG DR capsule; Take 1 capsule (40 mg) by mouth daily for 60 days      35 minutes spent on the date of the encounter doing chart review, patient visit and documentation        BMI:   Estimated body mass index is 48.18 kg/m  as calculated from the following:    Height as of 5/31/22: 1.6 m (5' 3\").    Weight as of this encounter: 123.4 kg (272 lb).   Weight management plan: Discussed healthy diet and exercise guidelines    Depression Screening Follow Up    PHQ 7/18/2022   PHQ-9 Total Score 12   Q9: Thoughts of better off dead/self-harm past 2 weeks Several days   F/U: Thoughts of suicide or self-harm Yes   F/U: Self harm-plan Yes   F/U: Self-harm action No   F/U: Safety concerns No         Return in about 2 weeks (around " 8/1/2022) for kathleen.    Argelia Castaneda NP  [facility] NIKA MCKNIGHT is a 22 year old presenting for the following health issues:  Allergies and Contraception      History of Present Illness       Reason for visit:  Allergies, birth control; and stomach issues    She eats 0-1 servings of fruits and vegetables daily.She consumes 2 sweetened beverage(s) daily.She exercises with enough effort to increase her heart rate 10 to 19 minutes per day.  She exercises with enough effort to increase her heart rate 3 or less days per week.   She is taking medications regularly.    Today's PHQ-9         PHQ-9 Total Score: 12    PHQ-9 Q9 Thoughts of better off dead/self-harm past 2 weeks :   Several days  Thoughts of suicide or self harm: (P) Yes  Self-harm Plan:   (P) Yes  Self-harm Action:     (P) No  Safety concerns for self or others: (P) No    How difficult have these problems made it for you to do your work, take care of things at home, or get along with other people: Extremely difficult       Wants to stop the depo shot due to weight gain      Allergies. flonase did not help    Upper abdominal pain- bubbling.  Painful.  Has bm couple times a day- takes a long time to go. , will range between liquid and hard.  Has not had issues in past.  Has had pain for years.  Has not tried anything for it.  Does have heartburn at times.  On omeprazole for her heartburn.  A couple nights ago had worsening symptoms.  Pain is dull at first and then gets worse and worse.  Nothing makes it better.  Is always there.  tums has not helped.  Has not noticed worsening with different foods.              Review of Systems   Constitutional, HEENT, cardiovascular, pulmonary, GI, , musculoskeletal, neuro, skin, endocrine and psych systems are negative, except as otherwise noted.      Objective    /80 (Cuff Size: Adult Large)   Temp 97.8  F (36.6  C) (Temporal)   Wt 123.4 kg (272 lb)   SpO2 98%   BMI 48.18  kg/m    Body mass index is 48.18 kg/m .  Physical Exam   GENERAL: healthy, alert and no distress  NECK: no adenopathy, no asymmetry, masses, or scars and thyroid normal to palpation  RESP: lungs clear to auscultation - no rales, rhonchi or wheezes  CV: regular rate and rhythm, normal S1 S2, no S3 or S4, no murmur, click or rub, no peripheral edema and peripheral pulses strong  ABDOMEN: soft, nontender, no hepatosplenomegaly, no masses and bowel sounds normal  MS: no gross musculoskeletal defects noted, no edema          .  ..

## 2022-07-18 NOTE — PATIENT INSTRUCTIONS
Keep a diary of what you are eating and drinking, pain- describe it, and bowel movements.      Add mometazone for allergies    Start birth control pill when you would have got next depo shot    Increase omeprazole    Update by kathleen

## 2022-07-24 ENCOUNTER — MYC MEDICAL ADVICE (OUTPATIENT)
Dept: FAMILY MEDICINE | Facility: CLINIC | Age: 22
End: 2022-07-24

## 2022-07-24 DIAGNOSIS — K59.01 SLOW TRANSIT CONSTIPATION: Primary | ICD-10-CM

## 2022-07-25 NOTE — TELEPHONE ENCOUNTER
Voicemail left for patient regarding referral for GI.    Order pended for provider approval.     ARNULFO Ham, CMA

## 2022-07-26 ENCOUNTER — OFFICE VISIT (OUTPATIENT)
Dept: UROLOGY | Facility: CLINIC | Age: 22
End: 2022-07-26
Payer: COMMERCIAL

## 2022-07-26 VITALS
BODY MASS INDEX: 48.2 KG/M2 | DIASTOLIC BLOOD PRESSURE: 76 MMHG | HEIGHT: 63 IN | SYSTOLIC BLOOD PRESSURE: 118 MMHG | WEIGHT: 272 LBS

## 2022-07-26 DIAGNOSIS — N39.44 NOCTURNAL ENURESIS: Primary | ICD-10-CM

## 2022-07-26 LAB
ALBUMIN UR-MCNC: NEGATIVE MG/DL
APPEARANCE UR: CLEAR
BILIRUB UR QL STRIP: NEGATIVE
COLOR UR AUTO: YELLOW
GLUCOSE UR STRIP-MCNC: NEGATIVE MG/DL
HGB UR QL STRIP: NEGATIVE
KETONES UR STRIP-MCNC: NEGATIVE MG/DL
LEUKOCYTE ESTERASE UR QL STRIP: NEGATIVE
NITRATE UR QL: NEGATIVE
PH UR STRIP: 6 [PH] (ref 5–7)
SP GR UR STRIP: 1.02 (ref 1–1.03)
UROBILINOGEN UR STRIP-MCNC: NORMAL MG/DL

## 2022-07-26 PROCEDURE — 99213 OFFICE O/P EST LOW 20 MIN: CPT | Performed by: UROLOGY

## 2022-07-26 PROCEDURE — 81003 URINALYSIS AUTO W/O SCOPE: CPT | Performed by: UROLOGY
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
## 2022-08-07 ENCOUNTER — MYC MEDICAL ADVICE (OUTPATIENT)
Dept: GASTROENTEROLOGY | Facility: CLINIC | Age: 22
End: 2022-08-07

## 2022-08-08 ENCOUNTER — MYC MEDICAL ADVICE (OUTPATIENT)
Dept: FAMILY MEDICINE | Facility: CLINIC | Age: 22
End: 2022-08-08

## 2022-08-23 ENCOUNTER — OFFICE VISIT (OUTPATIENT)
Dept: GASTROENTEROLOGY | Facility: CLINIC | Age: 22
End: 2022-08-23
Payer: COMMERCIAL

## 2022-08-23 VITALS
WEIGHT: 272 LBS | BODY MASS INDEX: 48.2 KG/M2 | DIASTOLIC BLOOD PRESSURE: 74 MMHG | SYSTOLIC BLOOD PRESSURE: 116 MMHG | HEIGHT: 63 IN

## 2022-08-23 DIAGNOSIS — K58.2 IRRITABLE BOWEL SYNDROME WITH BOTH CONSTIPATION AND DIARRHEA: Primary | ICD-10-CM

## 2022-08-23 PROCEDURE — 99203 OFFICE O/P NEW LOW 30 MIN: CPT | Performed by: INTERNAL MEDICINE

## 2022-08-23 RX ORDER — METHYLPHENIDATE HCL 20 MG
1 CAPSULE,EXTENDED RELEASE BIPHASIC 50-50 ORAL EVERY MORNING
COMMUNITY
Start: 2022-08-15 | End: 2023-07-31

## 2022-08-23 RX ORDER — BUPROPION HYDROCHLORIDE 150 MG/1
TABLET ORAL
COMMUNITY
Start: 2022-08-11

## 2022-08-23 ASSESSMENT — PAIN SCALES - GENERAL: PAINLEVEL: MODERATE PAIN (4)

## 2022-08-23 NOTE — LETTER
"    8/23/2022         RE: Regina Marshall  [address] [place] [id number]        Dear Colleague,    Thank you for referring your patient, Regina Marshall, to the [facility]. Please see a copy of my visit note below.    Gastroenterology    22-year-old to review GI concerns for several years duration.  Patient notes history of heartburn this is mild and variable there is no benefit with omeprazole.  She takes his premeals with benefit.  Although she does not always eat breakfast.  Second, patient notes variable stools often she will have 2-3 a day these can be loose these can be hard these can be difficult to pass associated with intestinal distress shape like it upside down \"c\" right side center left side with improvement with defecation.  No more profound gastrocolic reflex.  Patient notes she is not on a bowel program and has a tendency to hold bowel movements too long.  Appetite somewhat variable.  Abdominal distress is aggravated with greasy food.  Milk typically troubles her but she is changed to Lactaid brand.  Treatment in the past MiraLAX produced significant diarrhea.  Bran caused more trouble.  Some probiotics in the past without benefit.    No sugar-free gum or candies, no honey or agave, rare use of soda.    Past medical history: Allergic rhinitis, BMI 48.2, history of depression, PTSD, ADHD, anxiety.    Medications reviewed on epic    Allergies reviewed on epic    Family history unknown    Review of systems negative noncontributory    No acute distress.  Weight 272.  Blood pressure 116/74, pulse 100.,  Head and neck unremarkable, lungs clear throughout, abdomen soft nontender without repeat rebound or guarding mild discomfort in the epigastrium with some palpation, no masses no organomegaly no lower extremity edema skin without rash.    Impression/Plan: Functional intestinal disorder.  Explained the nature of the gastrocolic reflex.  I discussed stool habits and bowel program " some people maximize gastrocolic reflex after meals.  There is no particular medication designed for IBS-M.  Fiber may be a reasonable solution such as Citrucel daily start out with third or half a scoop and increase slowly over a series of weeks.  25 g of suggestions for women daily.  Continue proton pump inhibitor for symptom control at times twice daily dosing may provide 18 hours of coverage compared to 13.  Consider squatty potty to help with angulation of the pelvis with a scheduled bowel program.  IBgard may be optional for abdominal cramps as a over-the-counter peppermint treatment.  Probiotics scientific studies were limited its unknown the benefit at this time for patients with functional intestinal disorder.  4-week trial of low FODMAPs foods with attention to fruits and sweeteners.  Website provided.  As needed return          Again, thank you for allowing me to participate in the care of your patient.        Sincerely,        Trever Chandler MD

## 2022-08-23 NOTE — PROGRESS NOTES
"Gastroenterology    22-year-old to review GI concerns for several years duration.  Patient notes history of heartburn this is mild and variable there is no benefit with omeprazole.  She takes his premeals with benefit.  Although she does not always eat breakfast.  Second, patient notes variable stools often she will have 2-3 a day these can be loose these can be hard these can be difficult to pass associated with intestinal distress shape like it upside down \"c\" right side center left side with improvement with defecation.  No more profound gastrocolic reflex.  Patient notes she is not on a bowel program and has a tendency to hold bowel movements too long.  Appetite somewhat variable.  Abdominal distress is aggravated with greasy food.  Milk typically troubles her but she is changed to Lactaid brand.  Treatment in the past MiraLAX produced significant diarrhea.  Bran caused more trouble.  Some probiotics in the past without benefit.    No sugar-free gum or candies, no honey or agave, rare use of soda.    Past medical history: Allergic rhinitis, BMI 48.2, history of depression, PTSD, ADHD, anxiety.    Medications reviewed on epic    Allergies reviewed on epic    Family history unknown    Review of systems negative noncontributory    No acute distress.  Weight 272.  Blood pressure 116/74, pulse 100.,  Head and neck unremarkable, lungs clear throughout, abdomen soft nontender without repeat rebound or guarding mild discomfort in the epigastrium with some palpation, no masses no organomegaly no lower extremity edema skin without rash.    Impression/Plan: Functional intestinal disorder.  Explained the nature of the gastrocolic reflex.  I discussed stool habits and bowel program some people maximize gastrocolic reflex after meals.  There is no particular medication designed for IBS-M.  Fiber may be a reasonable solution such as Citrucel daily start out with third or half a scoop and increase slowly over a series of weeks. "  25 g of suggestions for women daily.  Continue proton pump inhibitor for symptom control at times twice daily dosing may provide 18 hours of coverage compared to 13.  Consider squatty potty to help with angulation of the pelvis with a scheduled bowel program.  IBgard may be optional for abdominal cramps as a over-the-counter peppermint treatment.  Probiotics scientific studies were limited its unknown the benefit at this time for patients with functional intestinal disorder.  4-week trial of low FODMAPs foods with attention to fruits and sweeteners.  Website provided.  As needed return

## 2022-08-23 NOTE — PATIENT INSTRUCTIONS
As we discussed, the variable stools are from irritable bowel syndrome.  This is common and may affect 24% of individuals.  This is not cured but managed    1.  Citrucel daily with increasing small doses per week of half a scoop to 1 scoop slow increase may help prevent extremes of constipation and diarrhea    2.  Agree with Lactaid or fair life brand milk    3.  Prilosec 30 to 60 minutes premeals will work best.    4.  Milk of magnesia as needed for constipation may be a good solution.  Some people find squatty potty helpful for the bathroom to change the angle of the hips and pelvis.  Low-dose of Imodium half tablet or 1 tablet may help with diarrhea.    5.  The Skillery with attention to low FODMAPs fruits and sweeteners may be beneficial this could be something explored for a 4-week trial.    6.  IBgard is a over-the-counter option with peppermint that may help intestinal distress.

## 2022-09-01 ENCOUNTER — MYC MEDICAL ADVICE (OUTPATIENT)
Dept: FAMILY MEDICINE | Facility: CLINIC | Age: 22
End: 2022-09-01

## 2022-09-01 DIAGNOSIS — N94.3 PMS (PREMENSTRUAL SYNDROME): Primary | ICD-10-CM

## 2022-09-02 NOTE — TELEPHONE ENCOUNTER
Patient Returning Call    Reason for call:  Get an update    Information relayed to patient:  Patient was notified that the provider responded to her [employer] message and message was read to patient. Patient took down the number and stated that she will call for appointment.    Patient has additional questions:  No    What are your questions/concerns:  NA

## 2022-09-14 DIAGNOSIS — K21.9 GASTROESOPHAGEAL REFLUX DISEASE WITHOUT ESOPHAGITIS: ICD-10-CM

## 2022-09-16 RX ORDER — OMEPRAZOLE 40 MG/1
CAPSULE, DELAYED RELEASE ORAL
Qty: 60 CAPSULE | Refills: 0 | Status: SHIPPED | OUTPATIENT
Start: 2022-09-16 | End: 2022-10-27

## 2022-09-16 NOTE — TELEPHONE ENCOUNTER
Routing refill request to provider for review/approval because:  Medication has an end date of 9/16. Please advise if patient should continue.     PUNEET PorrasN, RN

## 2022-09-18 ENCOUNTER — HEALTH MAINTENANCE LETTER (OUTPATIENT)
Age: 22
End: 2022-09-18

## 2022-10-11 ENCOUNTER — OFFICE VISIT (OUTPATIENT)
Dept: OBGYN | Facility: CLINIC | Age: 22
End: 2022-10-11
Payer: COMMERCIAL

## 2022-10-11 VITALS — BODY MASS INDEX: 48.18 KG/M2 | DIASTOLIC BLOOD PRESSURE: 84 MMHG | SYSTOLIC BLOOD PRESSURE: 128 MMHG | WEIGHT: 272 LBS

## 2022-10-11 DIAGNOSIS — N92.1 METRORRHAGIA: ICD-10-CM

## 2022-10-11 DIAGNOSIS — Z30.011 ENCOUNTER FOR INITIAL PRESCRIPTION OF CONTRACEPTIVE PILLS: Primary | ICD-10-CM

## 2022-10-11 DIAGNOSIS — F33.1 MODERATE EPISODE OF RECURRENT MAJOR DEPRESSIVE DISORDER (H): ICD-10-CM

## 2022-10-11 PROCEDURE — 99204 OFFICE O/P NEW MOD 45 MIN: CPT | Performed by: OBSTETRICS & GYNECOLOGY

## 2022-10-11 RX ORDER — HYDROXYZINE HYDROCHLORIDE 50 MG/1
50 TABLET, FILM COATED ORAL 2 TIMES DAILY PRN
COMMUNITY
Start: 2022-10-01

## 2022-10-11 RX ORDER — ACETAMINOPHEN AND CODEINE PHOSPHATE 120; 12 MG/5ML; MG/5ML
0.35 SOLUTION ORAL DAILY
Qty: 84 TABLET | Refills: 1 | Status: SHIPPED | OUTPATIENT
Start: 2022-10-11 | End: 2023-05-15

## 2022-10-11 NOTE — PATIENT INSTRUCTIONS
Please call if you any questions.    [facility]  [address] [place]   [address]  [phone]        Megan Vivar,
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
## 2022-10-26 ENCOUNTER — TELEPHONE (OUTPATIENT)
Dept: FAMILY MEDICINE | Facility: CLINIC | Age: 22
End: 2022-10-26

## 2022-10-26 ENCOUNTER — MYC MEDICAL ADVICE (OUTPATIENT)
Dept: OBGYN | Facility: CLINIC | Age: 22
End: 2022-10-26

## 2022-10-26 DIAGNOSIS — K21.9 GASTROESOPHAGEAL REFLUX DISEASE WITHOUT ESOPHAGITIS: ICD-10-CM

## 2022-10-26 NOTE — TELEPHONE ENCOUNTER
General Call    Contacts       Type Contact Phone/Fax    10/26/2022 03:59 PM CDT Phone (Incoming) Erna Marshall (Emergency Contact) [phone]        Reason for Call: omeprazole (PRILOSEC OTC) 20 MG EC tablet    What are your questions or concerns:  Casey mom called and states that she is unsure if the patient is supposed to be taking 20 mg twice a day or the 40 mg twice a day.     Patient has been taking 40mg twice daily.     Patient needs a refill sent to [address] [phone]    Date of last appointment with provider: 10/11/22    Could we send this information to you in [employer] or would you prefer to receive a phone call?:   Patient would like to be contacted via [employer]

## 2022-11-22 ENCOUNTER — OFFICE VISIT (OUTPATIENT)
Dept: OBGYN | Facility: OTHER | Age: 22
End: 2022-11-22
Payer: COMMERCIAL

## 2022-11-22 VITALS
HEART RATE: 105 BPM | WEIGHT: 264 LBS | SYSTOLIC BLOOD PRESSURE: 118 MMHG | DIASTOLIC BLOOD PRESSURE: 80 MMHG | BODY MASS INDEX: 46.77 KG/M2

## 2022-11-22 DIAGNOSIS — N89.8 VAGINAL ODOR: Primary | ICD-10-CM

## 2022-11-22 LAB
CLUE CELLS: ABNORMAL
TRICHOMONAS, WET PREP: ABNORMAL
WBC'S/HIGH POWER FIELD, WET PREP: ABNORMAL
YEAST, WET PREP: ABNORMAL

## 2022-11-22 PROCEDURE — 99213 OFFICE O/P EST LOW 20 MIN: CPT | Performed by: OBSTETRICS & GYNECOLOGY

## 2022-11-22 PROCEDURE — 87491 CHLMYD TRACH DNA AMP PROBE: CPT | Performed by: OBSTETRICS & GYNECOLOGY

## 2022-11-22 PROCEDURE — 87591 N.GONORRHOEAE DNA AMP PROB: CPT | Performed by: OBSTETRICS & GYNECOLOGY

## 2022-11-22 PROCEDURE — 87210 SMEAR WET MOUNT SALINE/INK: CPT | Performed by: OBSTETRICS & GYNECOLOGY
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
## 2022-11-23 LAB
C TRACH DNA SPEC QL NAA+PROBE: NEGATIVE
N GONORRHOEA DNA SPEC QL NAA+PROBE: NEGATIVE

## 2022-11-28 RX ORDER — CLINDAMYCIN PHOSPHATE 20 MG/G
1 CREAM VAGINAL AT BEDTIME
Qty: 40 G | Refills: 0 | Status: SHIPPED | OUTPATIENT
Start: 2022-11-28 | End: 2023-07-31

## 2023-01-28 ENCOUNTER — HEALTH MAINTENANCE LETTER (OUTPATIENT)
Age: 23
End: 2023-01-28

## 2023-02-04 DIAGNOSIS — K21.9 GASTROESOPHAGEAL REFLUX DISEASE WITHOUT ESOPHAGITIS: ICD-10-CM

## 2023-02-07 NOTE — TELEPHONE ENCOUNTER
Routing refill request to provider for review/approval because:    Requested Prescriptions   Pending Prescriptions Disp Refills    omeprazole (PRILOSEC) 20 MG DR capsule [Pharmacy Med Name: OMEPRAZOLE 20MG CPDR] 180 capsule 0     Sig: TAKE ONE CAPSULE BY MOUTH TWICE A DAY       PPI Protocol Failed - 2/4/2023 10:29 PM        Failed - Medication is active on med list

## 2023-02-20 ENCOUNTER — E-VISIT (OUTPATIENT)
Dept: URGENT CARE | Facility: CLINIC | Age: 23
End: 2023-02-20
Payer: COMMERCIAL

## 2023-02-20 DIAGNOSIS — R19.7 DIARRHEA, UNSPECIFIED TYPE: Primary | ICD-10-CM

## 2023-02-20 PROCEDURE — 99207 PR NON-BILLABLE SERV PER CHARTING: CPT | Performed by: NURSE PRACTITIONER

## 2023-02-20 NOTE — PATIENT INSTRUCTIONS
Dear Regina Marshall,    We are sorry you are not feeling well. Based on the responses you provided, it is recommended that you be seen in-person in urgent care so we can better evaluate your symptoms. Please click here to find the nearest urgent care location to you.   You will not be charged for this Visit. Thank you for trusting us with your care.    MARIAM Mayer CNP

## 2023-02-21 ENCOUNTER — MYC MEDICAL ADVICE (OUTPATIENT)
Dept: GASTROENTEROLOGY | Facility: CLINIC | Age: 23
End: 2023-02-21
Payer: COMMERCIAL

## 2023-02-24 DIAGNOSIS — K58.2 IRRITABLE BOWEL SYNDROME WITH BOTH CONSTIPATION AND DIARRHEA: Primary | ICD-10-CM

## 2023-02-24 RX ORDER — CHOLESTYRAMINE 4 G/9G
1 POWDER, FOR SUSPENSION ORAL AT BEDTIME
Qty: 30 PACKET | Refills: 4 | Status: SHIPPED | OUTPATIENT
Start: 2023-02-24 | End: 2024-05-02

## 2023-02-24 NOTE — TELEPHONE ENCOUNTER
Trever Chandler MD  You 1 hour ago (7:29 AM)     ML  Suggest:     1. Prilosec OTC before meals twice daily.     2. Ibgard for the cramps   3. Powder at bedtime for diarrhea.  Rx sent.  Imodium can be added if needed   4. Should help symptoms.

## 2023-03-29 ENCOUNTER — OFFICE VISIT (OUTPATIENT)
Dept: INTERNAL MEDICINE | Facility: CLINIC | Age: 23
End: 2023-03-29
Payer: COMMERCIAL

## 2023-03-29 VITALS
TEMPERATURE: 96.9 F | WEIGHT: 276.3 LBS | RESPIRATION RATE: 18 BRPM | BODY MASS INDEX: 47.17 KG/M2 | HEART RATE: 88 BPM | DIASTOLIC BLOOD PRESSURE: 80 MMHG | HEIGHT: 64 IN | OXYGEN SATURATION: 99 % | SYSTOLIC BLOOD PRESSURE: 124 MMHG

## 2023-03-29 DIAGNOSIS — K59.1 FUNCTIONAL DIARRHEA: Primary | ICD-10-CM

## 2023-03-29 DIAGNOSIS — E66.01 MORBID OBESITY (H): ICD-10-CM

## 2023-03-29 PROCEDURE — 99213 OFFICE O/P EST LOW 20 MIN: CPT | Performed by: INTERNAL MEDICINE

## 2023-03-29 ASSESSMENT — PATIENT HEALTH QUESTIONNAIRE - PHQ9
10. IF YOU CHECKED OFF ANY PROBLEMS, HOW DIFFICULT HAVE THESE PROBLEMS MADE IT FOR YOU TO DO YOUR WORK, TAKE CARE OF THINGS AT HOME, OR GET ALONG WITH OTHER PEOPLE: NOT DIFFICULT AT ALL
SUM OF ALL RESPONSES TO PHQ QUESTIONS 1-9: 7
SUM OF ALL RESPONSES TO PHQ QUESTIONS 1-9: 7

## 2023-03-29 ASSESSMENT — PAIN SCALES - GENERAL: PAINLEVEL: MILD PAIN (3)

## 2023-03-29 ASSESSMENT — ENCOUNTER SYMPTOMS: DIARRHEA: 1

## 2023-03-29 NOTE — PROGRESS NOTES
Ruth MCKNIGHT is a 23 year old, presenting for the following health issues:  Diarrhea    Additional Questions 3/29/2023   Roomed by Chante GARDNER LPN   Accompanied by Erna, janette mother     Patient Reported Additional Medications 3/29/2023   Patient reports taking the following new medications Vyvanse 40 mg 1 tablet daily     Diarrhea    History of Present Illness       Reason for visit:  Stomach Issues  Symptom onset:  More than a month  Symptoms include:  Diarrhea  Symptom intensity:  Moderate  Symptom progression:  Improving  Had these symptoms before:  Yes  Has tried/received treatment for these symptoms:  No  What makes it worse:  Drinking or After Eating  What makes it better:  Water    She eats 0-1 servings of fruits and vegetables daily.She consumes 4 sweetened beverage(s) daily.She exercises with enough effort to increase her heart rate 9 or less minutes per day.  She exercises with enough effort to increase her heart rate 3 or less days per week.   She is taking medications regularly.    Today's PHQ-9         PHQ-9 Total Score: 7    PHQ-9 Q9 Thoughts of better off dead/self-harm past 2 weeks :   Not at all    How difficult have these problems made it for you to do your work, take care of things at home, or get along with other people: Not difficult at all     EMR reviewed including:             Complaint, History of Chief Complaint, Corresponding Review of Systems, and Complaint Specific Physical Examination.    #1   Diarrhea 4-5 times per day  Denies melena or hematochezia  Has some abdominal cramping and flatulence  Symptoms present for over a year.  Has already been seen by gastroenterology  Started on Questran for IBS  refuses to take.  No history of inflammatory bowel disease.  Eats several cups of yogurt per day.  Frequently drinks milk  Has not tried milk abstinence.  Denies fevers, chills or signs of systemic infection            Exam:   Constitutional: The patient appears to be in no acute  "distress. The patient appears to be adequately hydrated. No acute respiratory or hemodynamic distress is noted at this time.   LUNGS: clear bilaterally, airflow is brisk, no intercostal retraction or stridor is noted. No coughing is noted during visit.   HEART:  regular without rubs, clicks, gallops, or murmurs. PMI is nondisplaced. Upstrokes are brisk. S1,S2 are heard.   GI: Abdomen is soft, without rebound, guarding or tenderness. Bowel sounds are slightly increased. No renal bruits are heard.        Patient has been interviewed, applicable history and applied review of systems have been performed.    Vital Signs:   /80 (BP Location: Right arm, Patient Position: Chair)   Pulse 88   Temp 96.9  F (36.1  C) (Temporal)   Resp 18   Ht 1.626 m (5' 4\")   Wt 125.3 kg (276 lb 4.8 oz)   LMP 03/24/2023   SpO2 99%   BMI 47.43 kg/m        Decision Making    Problem and Complexity  1. Functional diarrhea  Suspect irritable bowel.   Patient's history noted in detail  Must rule out lactose intolerance   Recommend a 2-week zero  milk/cheese/yogurt intake diet    2. Morbid obesity (H)  Recommend weight loss responsible caloric restriction and exercise                               FOLLOW UP   I have asked the patient to make an appointment for followup with either:  1.  Me,  2.  The patient's preferred provider,  3.  Any available provider  In 2 weeks with her results    Recommend she follows up with her primary care provider for her ongoing health care, maintenance, gynecologic exams etc.        Regarding routine vaccinations:  I have reviewed the patient's vaccination schedule and discussed the benefits of prophylactic vaccination in detail.  I recommend the patient contact their pharmacist (not the pharmacy within the clinic) for vaccinations.  Discussed that most insurance companies now favor reimbursement to the pharmacies and it will financially behoove the patient to have vaccinations performed at their " pharmacy.        I have carefully explained the diagnosis and treatment options to the patient.  The patient has displayed an understanding of the above, and all subsequent questions were answered.      DO CHAPIS Joshi    Portions of this note were produced using [employer]  Although every attempt at real-time proof reading has been made, occasional grammar/syntax errors may have been missed.

## 2023-03-30 PROBLEM — E66.01 MORBID OBESITY (H): Status: ACTIVE | Noted: 2023-03-30

## 2023-05-14 DIAGNOSIS — N92.1 METRORRHAGIA: ICD-10-CM

## 2023-05-14 DIAGNOSIS — K21.9 GASTROESOPHAGEAL REFLUX DISEASE WITHOUT ESOPHAGITIS: ICD-10-CM

## 2023-05-15 RX ORDER — ACETAMINOPHEN AND CODEINE PHOSPHATE 120; 12 MG/5ML; MG/5ML
SOLUTION ORAL
Qty: 84 TABLET | Refills: 1 | Status: SHIPPED | OUTPATIENT
Start: 2023-05-15 | End: 2023-09-28

## 2023-05-15 NOTE — TELEPHONE ENCOUNTER
"Requested Prescriptions   Pending Prescriptions Disp Refills     norethindrone (MICRONOR) 0.35 MG tablet [Pharmacy Med Name: NORETHINDRONE 0.35MG TABS] 84 tablet 1     Sig: TAKE ONE TABLET BY MOUTH ONCE DAILY       Contraceptives Protocol Passed - 5/14/2023  1:59 PM        Passed - Patient is not a current smoker if age is 35 or older        Passed - Recent (12 mo) or future (30 days) visit within the authorizing provider's specialty     Patient has had an office visit with the authorizing provider or a provider within the authorizing providers department within the previous 12 mos or has a future within next 30 days. See \"Patient Info\" tab in inbasket, or \"Choose Columns\" in Meds & Orders section of the refill encounter.              Passed - Medication is active on med list        Passed - No active pregnancy on record        Passed - No positive pregnancy test in past 12 months           Pt last seen 11/22/2022 for vaginal odor    Last prescribed 10/11/2022 for 84 tablets with 1 refill    Pt was advised to f/u in 3 months from initial prescription- pt was seen 1 month later for vaginal concern.    RN routing to provider to advise if pt needs to be seen again or if OK to send to cover until 10/2023.    Xin Walker RN on 5/15/2023 at 10:05 AM            "

## 2023-06-11 DIAGNOSIS — R09.82 ALLERGIC RHINITIS WITH POSTNASAL DRIP: ICD-10-CM

## 2023-06-11 DIAGNOSIS — J30.9 ALLERGIC RHINITIS WITH POSTNASAL DRIP: ICD-10-CM

## 2023-06-13 RX ORDER — CETIRIZINE HYDROCHLORIDE 10 MG/1
TABLET ORAL
Qty: 90 TABLET | Refills: 2 | Status: SHIPPED | OUTPATIENT
Start: 2023-06-13 | End: 2023-07-31

## 2023-07-31 ENCOUNTER — OFFICE VISIT (OUTPATIENT)
Dept: FAMILY MEDICINE | Facility: CLINIC | Age: 23
End: 2023-07-31
Payer: COMMERCIAL

## 2023-07-31 VITALS
HEIGHT: 63 IN | TEMPERATURE: 97.9 F | HEART RATE: 96 BPM | OXYGEN SATURATION: 98 % | DIASTOLIC BLOOD PRESSURE: 82 MMHG | BODY MASS INDEX: 49.96 KG/M2 | SYSTOLIC BLOOD PRESSURE: 132 MMHG | WEIGHT: 282 LBS

## 2023-07-31 DIAGNOSIS — N92.0 MENORRHAGIA WITH REGULAR CYCLE: ICD-10-CM

## 2023-07-31 DIAGNOSIS — Z00.00 ROUTINE GENERAL MEDICAL EXAMINATION AT A HEALTH CARE FACILITY: Primary | ICD-10-CM

## 2023-07-31 DIAGNOSIS — J30.9 ALLERGIC RHINITIS WITH POSTNASAL DRIP: ICD-10-CM

## 2023-07-31 DIAGNOSIS — E66.01 MORBID OBESITY (H): ICD-10-CM

## 2023-07-31 DIAGNOSIS — Z13.220 LIPID SCREENING: ICD-10-CM

## 2023-07-31 DIAGNOSIS — R09.82 ALLERGIC RHINITIS WITH POSTNASAL DRIP: ICD-10-CM

## 2023-07-31 DIAGNOSIS — F33.1 MODERATE EPISODE OF RECURRENT MAJOR DEPRESSIVE DISORDER (H): ICD-10-CM

## 2023-07-31 DIAGNOSIS — Z12.4 CERVICAL CANCER SCREENING: ICD-10-CM

## 2023-07-31 DIAGNOSIS — Z30.09 GENERAL COUNSELING FOR PRESCRIPTION OF ORAL CONTRACEPTIVES: ICD-10-CM

## 2023-07-31 DIAGNOSIS — K21.9 GASTROESOPHAGEAL REFLUX DISEASE WITHOUT ESOPHAGITIS: ICD-10-CM

## 2023-07-31 LAB
ANION GAP SERPL CALCULATED.3IONS-SCNC: 10 MMOL/L (ref 7–15)
BASOPHILS # BLD AUTO: 0 10E3/UL (ref 0–0.2)
BASOPHILS NFR BLD AUTO: 1 %
BUN SERPL-MCNC: 8.2 MG/DL (ref 6–20)
CALCIUM SERPL-MCNC: 9.3 MG/DL (ref 8.6–10)
CHLORIDE SERPL-SCNC: 101 MMOL/L (ref 98–107)
CHOLEST SERPL-MCNC: 187 MG/DL
CREAT SERPL-MCNC: 0.68 MG/DL (ref 0.51–0.95)
DEPRECATED HCO3 PLAS-SCNC: 26 MMOL/L (ref 22–29)
EOSINOPHIL # BLD AUTO: 0.1 10E3/UL (ref 0–0.7)
EOSINOPHIL NFR BLD AUTO: 3 %
ERYTHROCYTE [DISTWIDTH] IN BLOOD BY AUTOMATED COUNT: 12.9 % (ref 10–15)
FERRITIN SERPL-MCNC: 80 NG/ML (ref 6–175)
GFR SERPL CREATININE-BSD FRML MDRD: >90 ML/MIN/1.73M2
GLUCOSE SERPL-MCNC: 93 MG/DL (ref 70–99)
HBA1C MFR BLD: 5.4 %
HCT VFR BLD AUTO: 40.1 % (ref 35–47)
HDLC SERPL-MCNC: 68 MG/DL
HGB BLD-MCNC: 12.9 G/DL (ref 11.7–15.7)
IMM GRANULOCYTES # BLD: 0 10E3/UL
IMM GRANULOCYTES NFR BLD: 0 %
LDLC SERPL CALC-MCNC: 108 MG/DL
LYMPHOCYTES # BLD AUTO: 2.9 10E3/UL (ref 0.8–5.3)
LYMPHOCYTES NFR BLD AUTO: 54 %
MCH RBC QN AUTO: 28.3 PG (ref 26.5–33)
MCHC RBC AUTO-ENTMCNC: 32.2 G/DL (ref 31.5–36.5)
MCV RBC AUTO: 88 FL (ref 78–100)
MONOCYTES # BLD AUTO: 0.3 10E3/UL (ref 0–1.3)
MONOCYTES NFR BLD AUTO: 5 %
NEUTROPHILS # BLD AUTO: 2 10E3/UL (ref 1.6–8.3)
NEUTROPHILS NFR BLD AUTO: 37 %
NONHDLC SERPL-MCNC: 119 MG/DL
NRBC # BLD AUTO: 0 10E3/UL
NRBC BLD AUTO-RTO: 0 /100
PLATELET # BLD AUTO: 299 10E3/UL (ref 150–450)
POTASSIUM SERPL-SCNC: 4.2 MMOL/L (ref 3.4–5.3)
RBC # BLD AUTO: 4.56 10E6/UL (ref 3.8–5.2)
SODIUM SERPL-SCNC: 137 MMOL/L (ref 136–145)
TRIGL SERPL-MCNC: 57 MG/DL
TSH SERPL DL<=0.005 MIU/L-ACNC: 2.08 UIU/ML (ref 0.3–4.2)
WBC # BLD AUTO: 5.3 10E3/UL (ref 4–11)

## 2023-07-31 PROCEDURE — 99395 PREV VISIT EST AGE 18-39: CPT | Performed by: NURSE PRACTITIONER

## 2023-07-31 PROCEDURE — 80061 LIPID PANEL: CPT | Performed by: NURSE PRACTITIONER

## 2023-07-31 PROCEDURE — 84443 ASSAY THYROID STIM HORMONE: CPT | Performed by: NURSE PRACTITIONER

## 2023-07-31 PROCEDURE — 36415 COLL VENOUS BLD VENIPUNCTURE: CPT | Performed by: NURSE PRACTITIONER

## 2023-07-31 PROCEDURE — 80048 BASIC METABOLIC PNL TOTAL CA: CPT | Performed by: NURSE PRACTITIONER

## 2023-07-31 PROCEDURE — 85025 COMPLETE CBC W/AUTO DIFF WBC: CPT | Performed by: NURSE PRACTITIONER

## 2023-07-31 PROCEDURE — 96127 BRIEF EMOTIONAL/BEHAV ASSMT: CPT | Performed by: NURSE PRACTITIONER

## 2023-07-31 PROCEDURE — 82728 ASSAY OF FERRITIN: CPT | Performed by: NURSE PRACTITIONER

## 2023-07-31 PROCEDURE — G0145 SCR C/V CYTO,THINLAYER,RESCR: HCPCS | Performed by: NURSE PRACTITIONER

## 2023-07-31 PROCEDURE — 99214 OFFICE O/P EST MOD 30 MIN: CPT | Mod: 25 | Performed by: NURSE PRACTITIONER

## 2023-07-31 PROCEDURE — 83036 HEMOGLOBIN GLYCOSYLATED A1C: CPT | Performed by: NURSE PRACTITIONER

## 2023-07-31 RX ORDER — FEXOFENADINE HCL 180 MG/1
180 TABLET ORAL DAILY
COMMUNITY
End: 2024-08-09

## 2023-07-31 RX ORDER — LISDEXAMFETAMINE DIMESYLATE 40 MG/1
40 CAPSULE ORAL EVERY MORNING
COMMUNITY
Start: 2023-07-22 | End: 2024-05-21

## 2023-07-31 RX ORDER — FLUTICASONE PROPIONATE 50 MCG
1 SPRAY, SUSPENSION (ML) NASAL DAILY
Qty: 18.2 ML | Refills: 11 | Status: SHIPPED | OUTPATIENT
Start: 2023-07-31 | End: 2024-05-21

## 2023-07-31 RX ORDER — CETIRIZINE HYDROCHLORIDE 10 MG/1
10 TABLET ORAL DAILY
Qty: 90 TABLET | Refills: 3 | Status: SHIPPED | OUTPATIENT
Start: 2023-07-31 | End: 2024-07-01

## 2023-07-31 ASSESSMENT — ENCOUNTER SYMPTOMS
EYE PAIN: 0
COUGH: 0
DIARRHEA: 1
FREQUENCY: 0
WEAKNESS: 0
SORE THROAT: 0
NAUSEA: 0
PARESTHESIAS: 0
BREAST MASS: 0
FEVER: 0
HEADACHES: 0
HEMATOCHEZIA: 0
HEMATURIA: 0
SHORTNESS OF BREATH: 0
MYALGIAS: 0
NERVOUS/ANXIOUS: 1
CHILLS: 0
JOINT SWELLING: 0
PALPITATIONS: 0
ARTHRALGIAS: 0
HEARTBURN: 0
DIZZINESS: 0
ABDOMINAL PAIN: 0
CONSTIPATION: 1
DYSURIA: 0

## 2023-07-31 ASSESSMENT — PATIENT HEALTH QUESTIONNAIRE - PHQ9
SUM OF ALL RESPONSES TO PHQ QUESTIONS 1-9: 9
10. IF YOU CHECKED OFF ANY PROBLEMS, HOW DIFFICULT HAVE THESE PROBLEMS MADE IT FOR YOU TO DO YOUR WORK, TAKE CARE OF THINGS AT HOME, OR GET ALONG WITH OTHER PEOPLE: SOMEWHAT DIFFICULT
SUM OF ALL RESPONSES TO PHQ QUESTIONS 1-9: 9

## 2023-07-31 ASSESSMENT — PAIN SCALES - GENERAL: PAINLEVEL: NO PAIN (0)

## 2023-07-31 NOTE — PROGRESS NOTES
Answers submitted by the patient for this visit:  Patient Health Questionnaire (Submitted on 7/31/2023)  If you checked off any problems, how difficult have these problems made it for you to do your work, take care of things at home, or get along with other people?: Somewhat difficult  PHQ9 TOTAL SCORE: 9  Annual Preventive Visit (Submitted on 7/31/2023)  Chief Complaint: Annual Exam:  Frequency of exercise:: None  Getting at least 3 servings of Calcium per day:: NO  Diet:: Regular (no restrictions)  Taking medications regularly:: Yes  Medication side effects:: None  Bi-annual eye exam:: Yes  Dental care twice a year:: Yes  Sleep apnea or symptoms of sleep apnea:: Daytime drowsiness  abdominal pain: No  Blood in stool: No  Blood in urine: No  chest pain: No  chills: No  congestion: No  constipation: Yes  cough: No  diarrhea: Yes  dizziness: No  ear pain: No  eye pain: No  nervous/anxious: Yes  fever: No  frequency: No  genital sores: No  headaches: No  hearing loss: No  heartburn: No  arthralgias: No  joint swelling: No  peripheral edema: No  mood changes: Yes  myalgias: No  nausea: No  dysuria: No  palpitations: No  Skin sensation changes: No  sore throat: No  urgency: No  rash: No  shortness of breath: No  visual disturbance: No  weakness: No  pelvic pain: No  vaginal bleeding: No  vaginal discharge: Yes  tenderness: No  breast mass: No  breast discharge: No  Additional concerns today:: No

## 2023-07-31 NOTE — PATIENT INSTRUCTIONS
Preventive Health Recommendations  Female Ages 21 to 25     Yearly exam:   See your health care provider every year in order to  Review health changes.   Discuss preventive care.    Review your medicines if your doctor has prescribed any.    You should be tested each year for STDs (sexually transmitted diseases).     Talk to your provider about how often you should have cholesterol testing.    Get a Pap test every three years. If you have an abnormal result, your doctor may have you test more often.    If you are at risk for diabetes, you should have a diabetes test (fasting glucose).     Shots:   Get a flu shot each year.   Get a tetanus shot every 10 years.   Consider getting the shot (vaccine) that prevents cervical cancer (Gardasil).    Nutrition:   Eat at least 5 servings of fruits and vegetables each day.  Eat whole-grain bread, whole-wheat pasta and brown rice instead of white grains and rice.  Get adequate Calcium and Vitamin D.     Lifestyle  Exercise at least 150 minutes a week each week (30 minutes a day, 5 days a week). This will help you control your weight and prevent disease.  Limit alcohol to one drink per day.  No smoking.   Wear sunscreen to prevent skin cancer.  See your dentist every six months for an exam and cleaning.
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
## 2023-08-02 LAB
BKR LAB AP GYN ADEQUACY: NORMAL
BKR LAB AP GYN INTERPRETATION: NORMAL
BKR LAB AP HPV REFLEX: NO
BKR LAB AP PREVIOUS ABNORMAL: NORMAL
PATH REPORT.COMMENTS IMP SPEC: NORMAL
PATH REPORT.COMMENTS IMP SPEC: NORMAL
PATH REPORT.RELEVANT HX SPEC: NORMAL

## 2023-08-03 ENCOUNTER — TELEPHONE (OUTPATIENT)
Dept: FAMILY MEDICINE | Facility: CLINIC | Age: 23
End: 2023-08-03
Payer: COMMERCIAL

## 2023-08-14 ENCOUNTER — ANCILLARY PROCEDURE (OUTPATIENT)
Dept: GENERAL RADIOLOGY | Facility: CLINIC | Age: 23
End: 2023-08-14
Attending: PODIATRIST
Payer: COMMERCIAL

## 2023-08-14 ENCOUNTER — OFFICE VISIT (OUTPATIENT)
Dept: PODIATRY | Facility: CLINIC | Age: 23
End: 2023-08-14
Payer: COMMERCIAL

## 2023-08-14 VITALS
DIASTOLIC BLOOD PRESSURE: 76 MMHG | TEMPERATURE: 97.6 F | HEIGHT: 63 IN | BODY MASS INDEX: 49.96 KG/M2 | SYSTOLIC BLOOD PRESSURE: 112 MMHG | WEIGHT: 282 LBS

## 2023-08-14 DIAGNOSIS — Q66.6 PES VALGUS: Primary | ICD-10-CM

## 2023-08-14 DIAGNOSIS — E66.01 CLASS 3 SEVERE OBESITY WITHOUT SERIOUS COMORBIDITY WITH BODY MASS INDEX (BMI) OF 45.0 TO 49.9 IN ADULT, UNSPECIFIED OBESITY TYPE (H): ICD-10-CM

## 2023-08-14 DIAGNOSIS — E66.813 CLASS 3 SEVERE OBESITY WITHOUT SERIOUS COMORBIDITY WITH BODY MASS INDEX (BMI) OF 45.0 TO 49.9 IN ADULT, UNSPECIFIED OBESITY TYPE (H): ICD-10-CM

## 2023-08-14 DIAGNOSIS — Q66.6 PES VALGUS: ICD-10-CM

## 2023-08-14 PROCEDURE — 73630 X-RAY EXAM OF FOOT: CPT | Mod: TC | Performed by: RADIOLOGY

## 2023-08-14 PROCEDURE — 99213 OFFICE O/P EST LOW 20 MIN: CPT | Performed by: PODIATRIST

## 2023-08-14 ASSESSMENT — PAIN SCALES - GENERAL: PAINLEVEL: NO PAIN (0)
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
## 2023-09-18 DIAGNOSIS — N92.1 METRORRHAGIA: ICD-10-CM

## 2023-09-18 RX ORDER — ACETAMINOPHEN AND CODEINE PHOSPHATE 120; 12 MG/5ML; MG/5ML
SOLUTION ORAL
Qty: 84 TABLET | Refills: 1 | OUTPATIENT
Start: 2023-09-18

## 2023-09-18 NOTE — TELEPHONE ENCOUNTER
"Requested Prescriptions   Pending Prescriptions Disp Refills    norethindrone (MICRONOR) 0.35 MG tablet [Pharmacy Med Name: NORETHINDRONE 0.35MG TABS] 84 tablet 1     Sig: TAKE ONE TABLET BY MOUTH ONCE DAILY       Contraceptives Protocol Passed - 9/18/2023  3:21 PM        Passed - Patient is not a current smoker if age is 35 or older        Passed - Recent (12 mo) or future (30 days) visit within the authorizing provider's specialty     Patient has had an office visit with the authorizing provider or a provider within the authorizing providers department within the previous 12 mos or has a future within next 30 days. See \"Patient Info\" tab in inbasket, or \"Choose Columns\" in Meds & Orders section of the refill encounter.              Passed - Medication is active on med list        Passed - No active pregnancy on record        Passed - No positive pregnancy test in past 12 months              Last seen: 10/11/2022   Last prescribed: 5/15/2023 for 84 tabs with 1 refill    Should have 1 refill available   Receipt confirmed by pharmacy (5/15/2023 10:11 AM CDT)         "

## 2023-09-27 DIAGNOSIS — N92.1 METRORRHAGIA: ICD-10-CM

## 2023-09-28 RX ORDER — ACETAMINOPHEN AND CODEINE PHOSPHATE 120; 12 MG/5ML; MG/5ML
SOLUTION ORAL
Qty: 84 TABLET | Refills: 1 | Status: SHIPPED | OUTPATIENT
Start: 2023-09-28 | End: 2024-05-21

## 2023-09-28 NOTE — TELEPHONE ENCOUNTER
"Requested Prescriptions   Pending Prescriptions Disp Refills    norethindrone (MICRONOR) 0.35 MG tablet [Pharmacy Med Name: NORETHINDRONE 0.35MG TABS] 84 tablet 1     Sig: TAKE ONE TABLET BY MOUTH ONCE DAILY       Contraceptives Protocol Passed - 9/27/2023  5:41 PM        Passed - Patient is not a current smoker if age is 35 or older        Passed - Recent (12 mo) or future (30 days) visit within the authorizing provider's specialty     Patient has had an office visit with the authorizing provider or a provider within the authorizing providers department within the previous 12 mos or has a future within next 30 days. See \"Patient Info\" tab in inbasket, or \"Choose Columns\" in Meds & Orders section of the refill encounter.              Passed - Medication is active on med list        Passed - No active pregnancy on record        Passed - No positive pregnancy test in past 12 months           Pt last seen 11/22/2022 for vaginal odor    Last prescribed 5/15/2023 for 84 tablets with 1 refill    Pt has appt on 10/30/2023.    Refills remain at patient's pharmacy. Refill not appropriate at this time, because there are additional refills remaining on current prescription      Xin Walker RN on 9/28/2023 at 8:40 AM    "

## 2023-10-30 ENCOUNTER — OFFICE VISIT (OUTPATIENT)
Dept: OBGYN | Facility: OTHER | Age: 23
End: 2023-10-30
Payer: COMMERCIAL

## 2023-10-30 VITALS — DIASTOLIC BLOOD PRESSURE: 81 MMHG | BODY MASS INDEX: 49.8 KG/M2 | SYSTOLIC BLOOD PRESSURE: 135 MMHG | WEIGHT: 284.6 LBS

## 2023-10-30 DIAGNOSIS — Z30.430 ENCOUNTER FOR INSERTION OF INTRAUTERINE CONTRACEPTIVE DEVICE: Primary | ICD-10-CM

## 2023-10-30 PROBLEM — Z30.011 ENCOUNTER FOR INITIAL PRESCRIPTION OF CONTRACEPTIVE PILLS: Status: RESOLVED | Noted: 2022-10-11 | Resolved: 2023-10-30

## 2023-10-30 PROBLEM — Z97.5 IUD (INTRAUTERINE DEVICE) IN PLACE: Status: ACTIVE | Noted: 2023-10-30

## 2023-10-30 LAB
HCG UR QL: NEGATIVE
INTERNAL QC OK POCT: NORMAL
POCT KIT EXPIRATION DATE: NORMAL
POCT KIT LOT NUMBER: NORMAL

## 2023-10-30 PROCEDURE — 81025 URINE PREGNANCY TEST: CPT | Performed by: OBSTETRICS & GYNECOLOGY

## 2023-10-30 PROCEDURE — 58300 INSERT INTRAUTERINE DEVICE: CPT | Performed by: OBSTETRICS & GYNECOLOGY
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
## 2024-01-17 ENCOUNTER — MEDICAL CORRESPONDENCE (OUTPATIENT)
Dept: HEALTH INFORMATION MANAGEMENT | Facility: CLINIC | Age: 24
End: 2024-01-17

## 2024-01-17 ENCOUNTER — HOSPITAL ENCOUNTER (EMERGENCY)
Facility: CLINIC | Age: 24
Discharge: HOME | End: 2024-01-18
Attending: STUDENT IN AN ORGANIZED HEALTH CARE EDUCATION/TRAINING PROGRAM | Admitting: STUDENT IN AN ORGANIZED HEALTH CARE EDUCATION/TRAINING PROGRAM
Payer: COMMERCIAL

## 2024-01-17 VITALS
BODY MASS INDEX: 48.17 KG/M2 | WEIGHT: 275.3 LBS | RESPIRATION RATE: 20 BRPM | HEART RATE: 105 BPM | OXYGEN SATURATION: 100 % | SYSTOLIC BLOOD PRESSURE: 137 MMHG | TEMPERATURE: 98.6 F | DIASTOLIC BLOOD PRESSURE: 90 MMHG

## 2024-01-17 DIAGNOSIS — F32.A DEPRESSION WITH SUICIDAL IDEATION: ICD-10-CM

## 2024-01-17 DIAGNOSIS — F33.1 MODERATE EPISODE OF RECURRENT MAJOR DEPRESSIVE DISORDER (H): ICD-10-CM

## 2024-01-17 DIAGNOSIS — F41.1 GAD (GENERALIZED ANXIETY DISORDER): ICD-10-CM

## 2024-01-17 DIAGNOSIS — R45.851 DEPRESSION WITH SUICIDAL IDEATION: ICD-10-CM

## 2024-01-17 PROBLEM — F33.41 RECURRENT MAJOR DEPRESSIVE DISORDER, IN PARTIAL REMISSION (H): Status: ACTIVE | Noted: 2021-04-19

## 2024-01-17 PROBLEM — F33.9 RECURRENT MAJOR DEPRESSION (H): Status: ACTIVE | Noted: 2024-01-17

## 2024-01-17 LAB
ALBUMIN SERPL BCG-MCNC: 4.5 G/DL (ref 3.5–5.2)
ALBUMIN UR-MCNC: NEGATIVE MG/DL
ALP SERPL-CCNC: 97 U/L (ref 40–150)
ALT SERPL W P-5'-P-CCNC: 29 U/L (ref 0–50)
AMPHETAMINES UR QL SCN: ABNORMAL
ANION GAP SERPL CALCULATED.3IONS-SCNC: 15 MMOL/L (ref 7–15)
APAP SERPL-MCNC: <5 UG/ML (ref 10–30)
APPEARANCE UR: ABNORMAL
AST SERPL W P-5'-P-CCNC: 27 U/L (ref 0–45)
BARBITURATES UR QL SCN: ABNORMAL
BASOPHILS # BLD AUTO: 0 10E3/UL (ref 0–0.2)
BASOPHILS NFR BLD AUTO: 1 %
BENZODIAZ UR QL SCN: ABNORMAL
BILIRUB SERPL-MCNC: 0.3 MG/DL
BILIRUB UR QL STRIP: NEGATIVE
BUN SERPL-MCNC: 9.9 MG/DL (ref 6–20)
BZE UR QL SCN: ABNORMAL
CALCIUM SERPL-MCNC: 9.6 MG/DL (ref 8.6–10)
CANNABINOIDS UR QL SCN: ABNORMAL
CHLORIDE SERPL-SCNC: 100 MMOL/L (ref 98–107)
COLOR UR AUTO: YELLOW
CREAT SERPL-MCNC: 0.77 MG/DL (ref 0.51–0.95)
DEPRECATED HCO3 PLAS-SCNC: 22 MMOL/L (ref 22–29)
EGFRCR SERPLBLD CKD-EPI 2021: >90 ML/MIN/1.73M2
EOSINOPHIL # BLD AUTO: 0.1 10E3/UL (ref 0–0.7)
EOSINOPHIL NFR BLD AUTO: 1 %
ERYTHROCYTE [DISTWIDTH] IN BLOOD BY AUTOMATED COUNT: 13.3 % (ref 10–15)
ETHANOL SERPL-MCNC: <0.01 G/DL
FENTANYL UR QL: ABNORMAL
GLUCOSE SERPL-MCNC: 87 MG/DL (ref 70–99)
GLUCOSE UR STRIP-MCNC: NEGATIVE MG/DL
HCG UR QL: NEGATIVE
HCT VFR BLD AUTO: 41.1 % (ref 35–47)
HGB BLD-MCNC: 13.5 G/DL (ref 11.7–15.7)
HGB UR QL STRIP: NEGATIVE
IMM GRANULOCYTES # BLD: 0 10E3/UL
IMM GRANULOCYTES NFR BLD: 0 %
KETONES UR STRIP-MCNC: 5 MG/DL
LEUKOCYTE ESTERASE UR QL STRIP: NEGATIVE
LYMPHOCYTES # BLD AUTO: 3.5 10E3/UL (ref 0.8–5.3)
LYMPHOCYTES NFR BLD AUTO: 41 %
MCH RBC QN AUTO: 28.7 PG (ref 26.5–33)
MCHC RBC AUTO-ENTMCNC: 32.8 G/DL (ref 31.5–36.5)
MCV RBC AUTO: 87 FL (ref 78–100)
MONOCYTES # BLD AUTO: 0.3 10E3/UL (ref 0–1.3)
MONOCYTES NFR BLD AUTO: 4 %
MUCOUS THREADS #/AREA URNS LPF: PRESENT /LPF
NEUTROPHILS # BLD AUTO: 4.5 10E3/UL (ref 1.6–8.3)
NEUTROPHILS NFR BLD AUTO: 53 %
NITRATE UR QL: NEGATIVE
NRBC # BLD AUTO: 0 10E3/UL
NRBC BLD AUTO-RTO: 0 /100
OPIATES UR QL SCN: ABNORMAL
PCP QUAL URINE (ROCHE): ABNORMAL
PH UR STRIP: 5 [PH] (ref 5–7)
PLATELET # BLD AUTO: 240 10E3/UL (ref 150–450)
POTASSIUM SERPL-SCNC: 3.7 MMOL/L (ref 3.4–5.3)
PROT SERPL-MCNC: 8.4 G/DL (ref 6.4–8.3)
RBC # BLD AUTO: 4.71 10E6/UL (ref 3.8–5.2)
RBC URINE: 1 /HPF
SALICYLATES SERPL-MCNC: <0.3 MG/DL
SODIUM SERPL-SCNC: 137 MMOL/L (ref 135–145)
SP GR UR STRIP: 1.02 (ref 1–1.03)
SQUAMOUS EPITHELIAL: 1 /HPF
TSH SERPL DL<=0.005 MIU/L-ACNC: 3.35 UIU/ML (ref 0.3–4.2)
UROBILINOGEN UR STRIP-MCNC: 2 MG/DL
WBC # BLD AUTO: 8.5 10E3/UL (ref 4–11)
WBC URINE: 1 /HPF

## 2024-01-17 PROCEDURE — 99284 EMERGENCY DEPT VISIT MOD MDM: CPT | Performed by: STUDENT IN AN ORGANIZED HEALTH CARE EDUCATION/TRAINING PROGRAM

## 2024-01-17 PROCEDURE — 80307 DRUG TEST PRSMV CHEM ANLYZR: CPT | Performed by: STUDENT IN AN ORGANIZED HEALTH CARE EDUCATION/TRAINING PROGRAM

## 2024-01-17 PROCEDURE — 93005 ELECTROCARDIOGRAM TRACING: CPT | Performed by: STUDENT IN AN ORGANIZED HEALTH CARE EDUCATION/TRAINING PROGRAM

## 2024-01-17 PROCEDURE — 84443 ASSAY THYROID STIM HORMONE: CPT | Performed by: STUDENT IN AN ORGANIZED HEALTH CARE EDUCATION/TRAINING PROGRAM

## 2024-01-17 PROCEDURE — 81001 URINALYSIS AUTO W/SCOPE: CPT | Mod: XU | Performed by: STUDENT IN AN ORGANIZED HEALTH CARE EDUCATION/TRAINING PROGRAM

## 2024-01-17 PROCEDURE — 80053 COMPREHEN METABOLIC PANEL: CPT | Performed by: STUDENT IN AN ORGANIZED HEALTH CARE EDUCATION/TRAINING PROGRAM

## 2024-01-17 PROCEDURE — 80179 DRUG ASSAY SALICYLATE: CPT | Performed by: STUDENT IN AN ORGANIZED HEALTH CARE EDUCATION/TRAINING PROGRAM

## 2024-01-17 PROCEDURE — 82077 ASSAY SPEC XCP UR&BREATH IA: CPT | Performed by: STUDENT IN AN ORGANIZED HEALTH CARE EDUCATION/TRAINING PROGRAM

## 2024-01-17 PROCEDURE — 81025 URINE PREGNANCY TEST: CPT | Performed by: STUDENT IN AN ORGANIZED HEALTH CARE EDUCATION/TRAINING PROGRAM

## 2024-01-17 PROCEDURE — 36415 COLL VENOUS BLD VENIPUNCTURE: CPT | Performed by: STUDENT IN AN ORGANIZED HEALTH CARE EDUCATION/TRAINING PROGRAM

## 2024-01-17 PROCEDURE — 80143 DRUG ASSAY ACETAMINOPHEN: CPT | Performed by: STUDENT IN AN ORGANIZED HEALTH CARE EDUCATION/TRAINING PROGRAM

## 2024-01-17 PROCEDURE — 99284 EMERGENCY DEPT VISIT MOD MDM: CPT | Mod: 25 | Performed by: STUDENT IN AN ORGANIZED HEALTH CARE EDUCATION/TRAINING PROGRAM

## 2024-01-17 PROCEDURE — 85025 COMPLETE CBC W/AUTO DIFF WBC: CPT | Performed by: STUDENT IN AN ORGANIZED HEALTH CARE EDUCATION/TRAINING PROGRAM

## 2024-01-17 PROCEDURE — 93010 ELECTROCARDIOGRAM REPORT: CPT | Performed by: STUDENT IN AN ORGANIZED HEALTH CARE EDUCATION/TRAINING PROGRAM

## 2024-01-17 ASSESSMENT — ACTIVITIES OF DAILY LIVING (ADL)
ADLS_ACUITY_SCORE: 35
ADLS_ACUITY_SCORE: 35

## 2024-01-18 ENCOUNTER — MEDICAL CORRESPONDENCE (OUTPATIENT)
Dept: HEALTH INFORMATION MANAGEMENT | Facility: CLINIC | Age: 24
End: 2024-01-18

## 2024-01-18 ASSESSMENT — ACTIVITIES OF DAILY LIVING (ADL)
ADLS_ACUITY_SCORE: 35

## 2024-01-18 NOTE — ED TRIAGE NOTES
"Patient presents with concern for suicidal thoughts without plan or intention, anxiety, and depression. Patient states that \"things got stressful at home so I went on a walk and ended up here.\" Hx of mental health issues and inpatient admissions.     Triage Assessment (Adult)       Row Name 01/17/24 2004          Triage Assessment    Airway WDL WDL        Respiratory WDL    Respiratory WDL WDL        Skin Circulation/Temperature WDL    Skin Circulation/Temperature WDL WDL        Cardiac WDL    Cardiac WDL WDL        Peripheral/Neurovascular WDL    Peripheral Neurovascular WDL WDL        Cognitive/Neuro/Behavioral WDL    Cognitive/Neuro/Behavioral WDL WDL                     "
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
## 2024-01-18 NOTE — ED NOTES
Multiple attempts to reach Erna with call going to voicemail. VM left to have  to call ER when message was received. Pt needs a ride home. Milagros Robbins RN
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
## 2024-01-18 NOTE — DISCHARGE INSTRUCTIONS
Please follow-up with primary care doctor over the next 1 to 2 weeks as needed.  Please continue to monitor your therapy and medications and make your appointments as scheduled.  Please follow the instructions provided by our DEC assessment team and please follow-up and discuss current ER visit with your case management team.  Please return if any new symptoms occur or worsening of your current symptoms happen for reevaluation.

## 2024-01-18 NOTE — ED NOTES
Message was left at number in chart for Erna (generic voicemail) to see if patient can get a ride home from them.       Patient says she has a court appointed legal guardian by name of Gabe.  We do not have that documentation.  FYI.  Guardian was not needed as patient recommended for discharge.

## 2024-01-19 ENCOUNTER — TRANSFERRED RECORDS (OUTPATIENT)
Dept: HEALTH INFORMATION MANAGEMENT | Facility: CLINIC | Age: 24
End: 2024-01-19

## 2024-01-23 ENCOUNTER — TELEPHONE (OUTPATIENT)
Dept: FAMILY MEDICINE | Facility: CLINIC | Age: 24
End: 2024-01-23
Payer: COMMERCIAL

## 2024-01-24 NOTE — TELEPHONE ENCOUNTER
Patient is calling and is asking if she can add a question to her message from yesterday.    Patient stated she had an IUD recently placed and would like to know if she can see Argelia Castaneda NP for the follow up or if she would need to schedule with the provider that placed the IUD.    Stacy Arias RN

## 2024-01-24 NOTE — TELEPHONE ENCOUNTER
I can do her string check for her IUD and follow up for ED.  Will discuss EKG at that visit.  Can use any open slot but prenatal.  Argelia Castaneda, CNP

## 2024-02-29 ENCOUNTER — OFFICE VISIT (OUTPATIENT)
Dept: FAMILY MEDICINE | Facility: CLINIC | Age: 24
End: 2024-02-29
Payer: COMMERCIAL

## 2024-02-29 VITALS
OXYGEN SATURATION: 98 % | HEIGHT: 62 IN | BODY MASS INDEX: 49.5 KG/M2 | HEART RATE: 80 BPM | SYSTOLIC BLOOD PRESSURE: 116 MMHG | TEMPERATURE: 97.2 F | WEIGHT: 269 LBS | DIASTOLIC BLOOD PRESSURE: 72 MMHG

## 2024-02-29 DIAGNOSIS — Z30.431 ENCOUNTER FOR ROUTINE CHECKING OF INTRAUTERINE CONTRACEPTIVE DEVICE (IUD): Primary | ICD-10-CM

## 2024-02-29 PROCEDURE — 99213 OFFICE O/P EST LOW 20 MIN: CPT | Performed by: NURSE PRACTITIONER

## 2024-02-29 ASSESSMENT — PATIENT HEALTH QUESTIONNAIRE - PHQ9
SUM OF ALL RESPONSES TO PHQ QUESTIONS 1-9: 11
10. IF YOU CHECKED OFF ANY PROBLEMS, HOW DIFFICULT HAVE THESE PROBLEMS MADE IT FOR YOU TO DO YOUR WORK, TAKE CARE OF THINGS AT HOME, OR GET ALONG WITH OTHER PEOPLE: EXTREMELY DIFFICULT

## 2024-02-29 NOTE — PROGRESS NOTES
"  Assessment & Plan     Encounter for routine checking of intrauterine contraceptive device (IUD)  Iud strings in placed.  Discussed with patient how to check them herself.        22 minutes spent by me on the date of the encounter doing chart review, patient visit, and documentation       BMI  Estimated body mass index is 49.2 kg/m  as calculated from the following:    Height as of this encounter: 1.575 m (5' 2\").    Weight as of this encounter: 122 kg (269 lb).   Weight management plan: Discussed healthy diet and exercise guidelines          Ruth MCKNIGHT is a 23 year old, presenting for the following health issues:  IUD check    HPI         Here for IUD string check      Review of Systems  Constitutional, HEENT, cardiovascular, pulmonary, GI, , musculoskeletal, neuro, skin, endocrine and psych systems are negative, except as otherwise noted.      Objective    /72 (Cuff Size: Adult Large)   Pulse 80   Temp 97.2  F (36.2  C) (Temporal)   Ht 1.575 m (5' 2\")   Wt 122 kg (269 lb)   SpO2 98%   BMI 49.20 kg/m    Body mass index is 49.2 kg/m .  Physical Exam   GENERAL: alert and no distress   (female) : normal female external genitalia, normal urethral meatus, normal vaginal mucosa, and normal cervix/adnexa/uterus without masses or discharge.  IUD strings in place  NEURO: Normal strength and tone, mentation intact and speech normal  PSYCH: mentation appears normal, affect normal/bright          Signed Electronically by: Argelia Castaneda NP    "

## 2024-04-18 ENCOUNTER — VIRTUAL VISIT (OUTPATIENT)
Dept: FAMILY MEDICINE | Facility: CLINIC | Age: 24
End: 2024-04-18
Payer: COMMERCIAL

## 2024-04-18 DIAGNOSIS — R11.2 NAUSEA AND VOMITING, UNSPECIFIED VOMITING TYPE: ICD-10-CM

## 2024-04-18 DIAGNOSIS — E66.01 MORBID OBESITY (H): Primary | ICD-10-CM

## 2024-04-18 PROCEDURE — G2211 COMPLEX E/M VISIT ADD ON: HCPCS | Mod: 95 | Performed by: NURSE PRACTITIONER

## 2024-04-18 PROCEDURE — 99213 OFFICE O/P EST LOW 20 MIN: CPT | Mod: 95 | Performed by: NURSE PRACTITIONER

## 2024-04-18 RX ORDER — ONDANSETRON 8 MG/1
8 TABLET, FILM COATED ORAL EVERY 8 HOURS PRN
Qty: 30 TABLET | Refills: 1 | Status: SHIPPED | OUTPATIENT
Start: 2024-04-18 | End: 2024-07-01

## 2024-04-18 ASSESSMENT — PATIENT HEALTH QUESTIONNAIRE - PHQ9
SUM OF ALL RESPONSES TO PHQ QUESTIONS 1-9: 4
10. IF YOU CHECKED OFF ANY PROBLEMS, HOW DIFFICULT HAVE THESE PROBLEMS MADE IT FOR YOU TO DO YOUR WORK, TAKE CARE OF THINGS AT HOME, OR GET ALONG WITH OTHER PEOPLE: SOMEWHAT DIFFICULT
SUM OF ALL RESPONSES TO PHQ QUESTIONS 1-9: 4

## 2024-04-18 NOTE — PROGRESS NOTES
ROXANNA is a 24 year old who is being evaluated via a billable video visit.    What phone number would you like to be contacted at?   How would you like to obtain your AVS? Bettiehart      Assessment & Plan     Morbid obesity (H)  Has lost 20lbs on her own.  Would like to see dietary and discuss options for medications.  Referral weight management center.    - [facility]  Referral; Future    Nausea and vomiting, unspecified vomiting type  Uses sparingly- refilled today  - ondansetron (ZOFRAN) 8 MG tablet; Take 1 tablet (8 mg) by mouth every 8 hours as needed for nausea    The longitudinal plan of care for the diagnosis(es)/condition(s) as documented were addressed during this visit. Due to the added complexity in care, I will continue to support ROXANNA in the subsequent management and with ongoing continuity of care.     28 minutes spent by me on the date of the encounter doing chart review, review of test results, interpretation of tests, patient visit, and documentation       Subjective   ROXANNA is a 24 year old, presenting for the following health issues:  Weight Problem        4/18/2024     1:04 PM   Additional Questions   Roomed by Kathy GUPTA     Video Start Time: 1:49 PM    History of Present Illness       Back Pain:  She presents for follow up of back pain. Patient's back pain is a recurring problem.  Location of back pain:  Right lower back, left lower back, right middle of back, left middle of back, right shoulder, left shoulder, right side of waist and left side of waist  Description of back pain: cramping, dull ache and sharp  Back pain spreads: nowhere    Since patient first noticed back pain, pain is: gradually worsening  Does back pain interfere with her job:  Not applicable       Reason for visit:  Weight  Symptom onset:  More than a month  Symptoms include:  N/A  Symptom intensity:  Severe  Symptom progression:  Staying the same  Had these symptoms before:  Yes  Has tried/received  treatment for these symptoms:  No    She eats 0-1 servings of fruits and vegetables daily.She consumes 2 sweetened beverage(s) daily.She exercises with enough effort to increase her heart rate 10 to 19 minutes per day.  She exercises with enough effort to increase her heart rate 3 or less days per week. She is missing 1 dose(s) of medications per week.  She is not taking prescribed medications regularly due to remembering to take.     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  Age 15 started abilify and gained weight    Did a cleanse in December- felt great, drinking water, moving her body.  As soon as cleanse stopped and went back to regular food- then trying to do turkey hamburger and avoiding chocolate and candies.            Review of Systems  Constitutional, HEENT, cardiovascular, pulmonary, GI, , musculoskeletal, neuro, skin, endocrine and psych systems are negative, except as otherwise noted.      Objective           Vitals:  No vitals were obtained today due to virtual visit.    Physical Exam   GENERAL: alert and no distress  EYES: Eyes grossly normal to inspection.  No discharge or erythema, or obvious scleral/conjunctival abnormalities.  RESP: No audible wheeze, cough, or visible cyanosis.    SKIN: Visible skin clear. No significant rash, abnormal pigmentation or lesions.  NEURO: Cranial nerves grossly intact.  Mentation and speech appropriate for age.  PSYCH: Appropriate affect, tone, and pace of words    No results found for this or any previous visit (from the past 24 hour(s)).      Video-Visit Details    Type of service:  Video Visit   Video End Time: 1309  Originating Location (pt. Location): Home    Distant Location (provider location):  On-site  Platform used for Video Visit: Morales  Signed Electronically by: Argelia Castaneda NP

## 2024-05-02 ENCOUNTER — APPOINTMENT (OUTPATIENT)
Dept: CT IMAGING | Facility: CLINIC | Age: 24
End: 2024-05-02
Attending: NURSE PRACTITIONER
Payer: COMMERCIAL

## 2024-05-02 ENCOUNTER — HOSPITAL ENCOUNTER (EMERGENCY)
Facility: CLINIC | Age: 24
Discharge: HOME | End: 2024-05-02
Attending: NURSE PRACTITIONER | Admitting: NURSE PRACTITIONER
Payer: COMMERCIAL

## 2024-05-02 ENCOUNTER — APPOINTMENT (OUTPATIENT)
Dept: ULTRASOUND IMAGING | Facility: CLINIC | Age: 24
End: 2024-05-02
Attending: NURSE PRACTITIONER
Payer: COMMERCIAL

## 2024-05-02 VITALS
HEART RATE: 95 BPM | DIASTOLIC BLOOD PRESSURE: 95 MMHG | HEIGHT: 64 IN | RESPIRATION RATE: 18 BRPM | SYSTOLIC BLOOD PRESSURE: 141 MMHG | OXYGEN SATURATION: 99 % | TEMPERATURE: 98.2 F | WEIGHT: 264.2 LBS | BODY MASS INDEX: 45.11 KG/M2

## 2024-05-02 DIAGNOSIS — R10.13 EPIGASTRIC PAIN: ICD-10-CM

## 2024-05-02 LAB
ALBUMIN SERPL BCG-MCNC: 4.4 G/DL (ref 3.5–5.2)
ALBUMIN UR-MCNC: NEGATIVE MG/DL
ALP SERPL-CCNC: 107 U/L (ref 40–150)
ALT SERPL W P-5'-P-CCNC: 28 U/L (ref 0–50)
ANION GAP SERPL CALCULATED.3IONS-SCNC: 20 MMOL/L (ref 7–15)
APPEARANCE UR: CLEAR
AST SERPL W P-5'-P-CCNC: ABNORMAL U/L
BASOPHILS # BLD AUTO: 0 10E3/UL (ref 0–0.2)
BASOPHILS NFR BLD AUTO: 0 %
BILIRUB SERPL-MCNC: 0.2 MG/DL
BILIRUB UR QL STRIP: NEGATIVE
BUN SERPL-MCNC: 14.6 MG/DL (ref 6–20)
CALCIUM SERPL-MCNC: 9.2 MG/DL (ref 8.6–10)
CHLORIDE SERPL-SCNC: 103 MMOL/L (ref 98–107)
COLOR UR AUTO: ABNORMAL
CREAT SERPL-MCNC: 1.01 MG/DL (ref 0.51–0.95)
DEPRECATED HCO3 PLAS-SCNC: 17 MMOL/L (ref 22–29)
EGFRCR SERPLBLD CKD-EPI 2021: 80 ML/MIN/1.73M2
EOSINOPHIL # BLD AUTO: 0.1 10E3/UL (ref 0–0.7)
EOSINOPHIL NFR BLD AUTO: 1 %
ERYTHROCYTE [DISTWIDTH] IN BLOOD BY AUTOMATED COUNT: 13.4 % (ref 10–15)
GLUCOSE SERPL-MCNC: 85 MG/DL (ref 70–99)
GLUCOSE UR STRIP-MCNC: NEGATIVE MG/DL
HCG UR QL: NEGATIVE
HCT VFR BLD AUTO: 44 % (ref 35–47)
HGB BLD-MCNC: 14.2 G/DL (ref 11.7–15.7)
HGB UR QL STRIP: NEGATIVE
HOLD SPECIMEN: NORMAL
IMM GRANULOCYTES # BLD: 0 10E3/UL
IMM GRANULOCYTES NFR BLD: 0 %
KETONES UR STRIP-MCNC: NEGATIVE MG/DL
LEUKOCYTE ESTERASE UR QL STRIP: NEGATIVE
LIPASE SERPL-CCNC: 52 U/L (ref 13–60)
LYMPHOCYTES # BLD AUTO: 2 10E3/UL (ref 0.8–5.3)
LYMPHOCYTES NFR BLD AUTO: 19 %
MCH RBC QN AUTO: 28.5 PG (ref 26.5–33)
MCHC RBC AUTO-ENTMCNC: 32.3 G/DL (ref 31.5–36.5)
MCV RBC AUTO: 88 FL (ref 78–100)
MONOCYTES # BLD AUTO: 0.6 10E3/UL (ref 0–1.3)
MONOCYTES NFR BLD AUTO: 6 %
MUCOUS THREADS #/AREA URNS LPF: PRESENT /LPF
NEUTROPHILS # BLD AUTO: 7.5 10E3/UL (ref 1.6–8.3)
NEUTROPHILS NFR BLD AUTO: 73 %
NITRATE UR QL: NEGATIVE
NRBC # BLD AUTO: 0 10E3/UL
NRBC BLD AUTO-RTO: 0 /100
PH UR STRIP: 5 [PH] (ref 5–7)
PLATELET # BLD AUTO: 306 10E3/UL (ref 150–450)
POTASSIUM SERPL-SCNC: 5.3 MMOL/L (ref 3.4–5.3)
PROT SERPL-MCNC: 9.1 G/DL (ref 6.4–8.3)
RBC # BLD AUTO: 4.99 10E6/UL (ref 3.8–5.2)
RBC URINE: 0 /HPF
SODIUM SERPL-SCNC: 140 MMOL/L (ref 135–145)
SP GR UR STRIP: 1.01 (ref 1–1.03)
SQUAMOUS EPITHELIAL: 2 /HPF
UROBILINOGEN UR STRIP-MCNC: NORMAL MG/DL
WBC # BLD AUTO: 10.3 10E3/UL (ref 4–11)
WBC URINE: 0 /HPF

## 2024-05-02 PROCEDURE — C9113 INJ PANTOPRAZOLE SODIUM, VIA: HCPCS | Performed by: NURSE PRACTITIONER

## 2024-05-02 PROCEDURE — 250N000011 HC RX IP 250 OP 636: Performed by: NURSE PRACTITIONER

## 2024-05-02 PROCEDURE — 99285 EMERGENCY DEPT VISIT HI MDM: CPT | Mod: 25 | Performed by: NURSE PRACTITIONER

## 2024-05-02 PROCEDURE — 36415 COLL VENOUS BLD VENIPUNCTURE: CPT | Performed by: NURSE PRACTITIONER

## 2024-05-02 PROCEDURE — 81001 URINALYSIS AUTO W/SCOPE: CPT | Performed by: NURSE PRACTITIONER

## 2024-05-02 PROCEDURE — 83690 ASSAY OF LIPASE: CPT | Performed by: NURSE PRACTITIONER

## 2024-05-02 PROCEDURE — 85025 COMPLETE CBC W/AUTO DIFF WBC: CPT | Performed by: NURSE PRACTITIONER

## 2024-05-02 PROCEDURE — 96361 HYDRATE IV INFUSION ADD-ON: CPT | Performed by: NURSE PRACTITIONER

## 2024-05-02 PROCEDURE — 258N000003 HC RX IP 258 OP 636: Performed by: NURSE PRACTITIONER

## 2024-05-02 PROCEDURE — 96375 TX/PRO/DX INJ NEW DRUG ADDON: CPT | Mod: 59 | Performed by: NURSE PRACTITIONER

## 2024-05-02 PROCEDURE — 84075 ASSAY ALKALINE PHOSPHATASE: CPT | Performed by: NURSE PRACTITIONER

## 2024-05-02 PROCEDURE — 96374 THER/PROPH/DIAG INJ IV PUSH: CPT | Mod: 59 | Performed by: NURSE PRACTITIONER

## 2024-05-02 PROCEDURE — 81025 URINE PREGNANCY TEST: CPT | Performed by: NURSE PRACTITIONER

## 2024-05-02 PROCEDURE — 96376 TX/PRO/DX INJ SAME DRUG ADON: CPT | Mod: 59 | Performed by: NURSE PRACTITIONER

## 2024-05-02 PROCEDURE — 250N000009 HC RX 250: Performed by: NURSE PRACTITIONER

## 2024-05-02 PROCEDURE — 99284 EMERGENCY DEPT VISIT MOD MDM: CPT | Performed by: NURSE PRACTITIONER

## 2024-05-02 PROCEDURE — 74177 CT ABD & PELVIS W/CONTRAST: CPT

## 2024-05-02 PROCEDURE — 76705 ECHO EXAM OF ABDOMEN: CPT

## 2024-05-02 RX ORDER — IOPAMIDOL 755 MG/ML
500 INJECTION, SOLUTION INTRAVASCULAR ONCE
Status: COMPLETED | OUTPATIENT
Start: 2024-05-02 | End: 2024-05-02

## 2024-05-02 RX ORDER — IOPAMIDOL 755 MG/ML
500 INJECTION, SOLUTION INTRAVASCULAR ONCE
Status: DISCONTINUED | OUTPATIENT
Start: 2024-05-02 | End: 2024-05-02

## 2024-05-02 RX ORDER — ONDANSETRON 2 MG/ML
4 INJECTION INTRAMUSCULAR; INTRAVENOUS ONCE
Status: COMPLETED | OUTPATIENT
Start: 2024-05-02 | End: 2024-05-02

## 2024-05-02 RX ORDER — ONDANSETRON 2 MG/ML
4 INJECTION INTRAMUSCULAR; INTRAVENOUS
Status: COMPLETED | OUTPATIENT
Start: 2024-05-02 | End: 2024-05-02

## 2024-05-02 RX ORDER — KETOROLAC TROMETHAMINE 15 MG/ML
15 INJECTION, SOLUTION INTRAMUSCULAR; INTRAVENOUS ONCE
Status: COMPLETED | OUTPATIENT
Start: 2024-05-02 | End: 2024-05-02

## 2024-05-02 RX ADMIN — PANTOPRAZOLE SODIUM 40 MG: 40 INJECTION, POWDER, FOR SOLUTION INTRAVENOUS at 13:39

## 2024-05-02 RX ADMIN — SODIUM CHLORIDE 1000 ML: 9 INJECTION, SOLUTION INTRAVENOUS at 12:14

## 2024-05-02 RX ADMIN — ONDANSETRON 4 MG: 2 INJECTION INTRAMUSCULAR; INTRAVENOUS at 12:52

## 2024-05-02 RX ADMIN — IOPAMIDOL 100 ML: 755 INJECTION, SOLUTION INTRAVENOUS at 13:50

## 2024-05-02 RX ADMIN — SODIUM CHLORIDE 60 ML: 9 INJECTION, SOLUTION INTRAVENOUS at 13:50

## 2024-05-02 RX ADMIN — KETOROLAC TROMETHAMINE 15 MG: 15 INJECTION, SOLUTION INTRAMUSCULAR; INTRAVENOUS at 12:15

## 2024-05-02 RX ADMIN — ONDANSETRON 4 MG: 2 INJECTION INTRAMUSCULAR; INTRAVENOUS at 11:59

## 2024-05-02 RX ADMIN — FAMOTIDINE 20 MG: 10 INJECTION, SOLUTION INTRAVENOUS at 12:52

## 2024-05-02 ASSESSMENT — COLUMBIA-SUICIDE SEVERITY RATING SCALE - C-SSRS
1. IN THE PAST MONTH, HAVE YOU WISHED YOU WERE DEAD OR WISHED YOU COULD GO TO SLEEP AND NOT WAKE UP?: NO
5. HAVE YOU STARTED TO WORK OUT OR WORKED OUT THE DETAILS OF HOW TO KILL YOURSELF? DO YOU INTEND TO CARRY OUT THIS PLAN?: NO
6. HAVE YOU EVER DONE ANYTHING, STARTED TO DO ANYTHING, OR PREPARED TO DO ANYTHING TO END YOUR LIFE?: YES
3. HAVE YOU BEEN THINKING ABOUT HOW YOU MIGHT KILL YOURSELF?: NO
4. HAVE YOU HAD THESE THOUGHTS AND HAD SOME INTENTION OF ACTING ON THEM?: NO
2. HAVE YOU ACTUALLY HAD ANY THOUGHTS OF KILLING YOURSELF IN THE PAST MONTH?: YES

## 2024-05-02 ASSESSMENT — ACTIVITIES OF DAILY LIVING (ADL)
ADLS_ACUITY_SCORE: 35
ADLS_ACUITY_SCORE: 35
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
## 2024-05-02 NOTE — ED TRIAGE NOTES
Pt reports stabbing pain in her upper abd with nausea, vomiting, and diarrhea that started last night.      Triage Assessment (Adult)       Row Name 05/02/24 1120 05/02/24 1118       Triage Assessment    Airway WDL WDL WDL       Respiratory WDL    Respiratory WDL WDL WDL       Skin Circulation/Temperature WDL    Skin Circulation/Temperature WDL WDL WDL       Cardiac WDL    Cardiac WDL WDL WDL       Peripheral/Neurovascular WDL    Peripheral Neurovascular WDL WDL WDL       Cognitive/Neuro/Behavioral WDL    Cognitive/Neuro/Behavioral WDL -- WDL

## 2024-05-02 NOTE — DISCHARGE INSTRUCTIONS
No worrisome findings on your ultrasound or CT scan today.  I have some suspicion that your upper abdominal pain and vomiting could be related to esophageal reflux.  Resume your omeprazole as prescribed.

## 2024-05-14 ENCOUNTER — TELEPHONE (OUTPATIENT)
Dept: ENDOCRINOLOGY | Facility: CLINIC | Age: 24
End: 2024-05-14
Payer: COMMERCIAL

## 2024-05-14 NOTE — TELEPHONE ENCOUNTER
Left Voicemail (1st Attempt) and Sent Mychart (1st Attempt) for the patient to call back and schedule the following:    Appointment type: NEW [facility]   Provider: Mary Mane NP  Return date: 06/28/2024  Specialty phone number: [phone]  Additional appointment(s) needed: n/a  Additonal Notes: Provider unavailable, pt needs to reschedule  OK to use new slot per Mary

## 2024-05-20 ENCOUNTER — TELEPHONE (OUTPATIENT)
Dept: ENDOCRINOLOGY | Facility: CLINIC | Age: 24
End: 2024-05-20
Payer: COMMERCIAL

## 2024-05-20 ASSESSMENT — SLEEP AND FATIGUE QUESTIONNAIRES
HOW LIKELY ARE YOU TO NOD OFF OR FALL ASLEEP WHILE SITTING INACTIVE IN A PUBLIC PLACE: SLIGHT CHANCE OF DOZING
HOW LIKELY ARE YOU TO NOD OFF OR FALL ASLEEP WHILE SITTING QUIETLY AFTER LUNCH WITHOUT ALCOHOL: SLIGHT CHANCE OF DOZING
HOW LIKELY ARE YOU TO NOD OFF OR FALL ASLEEP WHILE WATCHING TV: HIGH CHANCE OF DOZING
HOW LIKELY ARE YOU TO NOD OFF OR FALL ASLEEP WHILE SITTING AND TALKING TO SOMEONE: WOULD NEVER DOZE
HOW LIKELY ARE YOU TO NOD OFF OR FALL ASLEEP WHEN YOU ARE A PASSENGER IN A CAR FOR AN HOUR WITHOUT A BREAK: HIGH CHANCE OF DOZING
HOW LIKELY ARE YOU TO NOD OFF OR FALL ASLEEP IN A CAR, WHILE STOPPED FOR A FEW MINUTES IN TRAFFIC: SLIGHT CHANCE OF DOZING
HOW LIKELY ARE YOU TO NOD OFF OR FALL ASLEEP WHILE SITTING AND READING: MODERATE CHANCE OF DOZING
HOW LIKELY ARE YOU TO NOD OFF OR FALL ASLEEP WHILE LYING DOWN TO REST IN THE AFTERNOON WHEN CIRCUMSTANCES PERMIT: MODERATE CHANCE OF DOZING

## 2024-05-21 ENCOUNTER — TELEPHONE (OUTPATIENT)
Dept: ENDOCRINOLOGY | Facility: CLINIC | Age: 24
End: 2024-05-21

## 2024-05-21 ENCOUNTER — VIRTUAL VISIT (OUTPATIENT)
Dept: ENDOCRINOLOGY | Facility: CLINIC | Age: 24
End: 2024-05-21
Payer: COMMERCIAL

## 2024-05-21 VITALS — BODY MASS INDEX: 46.1 KG/M2 | HEIGHT: 64 IN | WEIGHT: 270 LBS

## 2024-05-21 DIAGNOSIS — E66.813 CLASS 3 DRUG-INDUCED OBESITY WITH SERIOUS COMORBIDITY AND BODY MASS INDEX (BMI) OF 45.0 TO 49.9 IN ADULT (H): Primary | ICD-10-CM

## 2024-05-21 DIAGNOSIS — E66.1 CLASS 3 DRUG-INDUCED OBESITY WITH SERIOUS COMORBIDITY AND BODY MASS INDEX (BMI) OF 45.0 TO 49.9 IN ADULT (H): Primary | ICD-10-CM

## 2024-05-21 PROCEDURE — 99205 OFFICE O/P NEW HI 60 MIN: CPT | Mod: 95

## 2024-05-21 RX ORDER — MIRTAZAPINE 45 MG/1
TABLET, FILM COATED ORAL
COMMUNITY
End: 2024-09-20

## 2024-05-21 RX ORDER — LISDEXAMFETAMINE DIMESYLATE 30 MG/1
30 CAPSULE ORAL EVERY MORNING
COMMUNITY
Start: 2024-05-02 | End: 2024-07-16

## 2024-05-21 RX ORDER — SEMAGLUTIDE 0.5 MG/.5ML
0.5 INJECTION, SOLUTION SUBCUTANEOUS WEEKLY
Qty: 2 ML | Refills: 2 | Status: SHIPPED | OUTPATIENT
Start: 2024-06-20 | End: 2024-07-16

## 2024-05-21 RX ORDER — SEMAGLUTIDE 0.25 MG/.5ML
0.25 INJECTION, SOLUTION SUBCUTANEOUS WEEKLY
Qty: 2 ML | Refills: 0 | Status: SHIPPED | OUTPATIENT
Start: 2024-05-21 | End: 2024-07-16

## 2024-05-21 ASSESSMENT — PAIN SCALES - GENERAL: PAINLEVEL: NO PAIN (0)
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
## 2024-05-21 NOTE — NURSING NOTE
Is the patient currently in the state of [place]? YES    Visit mode:VIDEO    If the visit is dropped, the patient can be reconnected by: VIDEO VISIT: Text to cell phone:   Telephone Information:   Mobile [phone]       Will anyone else be joining the visit? NO  (If patient encounters technical issues they should call [phone])    How would you like to obtain your AVS? MyChart    Are changes needed to the allergy or medication list? Pt stated no changes to allergies and Pt stated no med changes    Reason for visit: Consult    Marybeth BARKSDALEF

## 2024-05-21 NOTE — TELEPHONE ENCOUNTER
PA Initiation    Medication: WEGOVY 0.25 MG/0.5ML SC SOAJ  Insurance Company: [employer] - Phone [phone] Fax [phone]  Pharm[facility] Filling the Rx: [facility] - [place] - [address]  Filling Pharmacy Phone:    Filling Pharmacy Fax:    Start Date: 5/21/2024

## 2024-05-23 NOTE — TELEPHONE ENCOUNTER
Prior Authorization Approval    Medication: WEGOVY 0.25 MG/0.5ML SC SOAJ  Authorization Effective Date: 5/22/2024  Authorization Expiration Date: 11/21/2024  Approved Dose/Quantity: 2  Reference #: [id number]   Insurance Company: [employer] - Phone [phone] Fax [phone]  Expected CoPay: $    CoPay Card Available:      Financial Assistance Needed:    Which Pharmacy is filling the prescription: [facility] [place] - [place] - [address] [place] [address]  Pharmacy Notified: yes  Patient Notified: yes

## 2024-05-24 ENCOUNTER — TELEPHONE (OUTPATIENT)
Dept: ENDOCRINOLOGY | Facility: CLINIC | Age: 24
End: 2024-05-24

## 2024-05-24 NOTE — TELEPHONE ENCOUNTER
MTM referral from: [facility] visit (referral by provider)    MTM referral outreach attempt #2 on May 24, 2024 at 10:07 AM      Outcome: Patient not reachable after several attempts, routed to Pharmacist Team/Provider as an FYI    Use HBC for the carrier/Plan on the flowsheet      [employer] Message Sent    NAKUL Carrera

## 2024-05-28 ENCOUNTER — TELEPHONE (OUTPATIENT)
Dept: ENDOCRINOLOGY | Facility: CLINIC | Age: 24
End: 2024-05-28
Payer: COMMERCIAL

## 2024-05-28 NOTE — TELEPHONE ENCOUNTER
Left Voicemail (1st Attempt) and Sent Mychart (1st Attempt) for the patient to call back and schedule the following:    Appointment type: RET [facility]   Provider: Karishma Uribe or Kayla   Return date: 3 mos ( 08 /21/2024 )   Specialty phone number: [phone]  Additional appointment(s) needed: yes  Additonal Notes: Vicenta Ward PA-C, 5-6 mos ( 01/2025 )

## 2024-05-28 NOTE — PROGRESS NOTES
"Video-Visit Details    Type of service:  Video Visit    Video Start Time: 11:35 am    Video End Time: 12:05 pm    Other participants:   Pricilla Bass RD present for visit.   Pt's foster mother also present for half of visit.     Originating Location (pt. Location): Home    Distant Location (provider location):  Offsite (providers home) [facility] [place]     Platform used for Video Visit: [email or web address]    New Weight Management Nutrition Consultation    Regina Marshall is a 24 year old female presents today for new weight management nutrition consultation.  Patient referred by Vicenta Ward PA-C on May 21, 2024.    Patient with Co-morbidities of obesity includin/20/2024     3:48 PM   --   I have the following health issues associated with obesity None of the above   I have the following symptoms associated with obesity Knee Pain    Depression    Back Pain    Fatigue    Groin Rash    Hip Pain         Anthropometrics:  Weight 24: 270 lbs with BMI 46.32    Estimated body mass index is 45.98 kg/m  as calculated from the following:    Height as of this encounter: 1.626 m (5' 4.02\").    Weight as of this encounter: 121.6 kg (268 lb).    Current weight: 268 lbs, pt report    Medications for Weight Loss:  Wegovy prescribed 24 - Nausea at first but improving. Due for dose again today. Took first last Wednesday.     Also takes Vyvanse     NUTRITION HISTORY  Food allergies: NKFA  Food intolerances: None  Does not like certain fruits/vegetables - texture related per pt. Does not like mushrooms/avocado for example.   Dx with GERD recently - was having N/V. Improved with weight loss in December per chart review but worse recently with larger portion sizes/eating at night.   Vitamin/Mineral Supplements: Did not discuss today  Previous methods of diet modification for weight loss: cleanse in December - was helpful per pt. Advocare cleanse, took cleanse pills and followed certain " "recipes or meal replacements.   RD before: Has worked with a nutritionist before but does not recall details - was young per pt.     Pt goals: weight loss to be healthier/feel better and be more active   - Wants to decrease night time snacking.    - Hoping for future breast reduction per chart review     Weight gain associated with Abilify. Not able to go off of per pt - has been super helpful in other areas.     Recently quit vaping - increased hunger since then.    Per PA note   Eating 1 large meal a day, with 2 snacks during the day and 1 snack after dinner. Hunger is well controlled during the day, but then really hungry from dinner on. Larger portions especially at dinner. Eats very quickly. I hard to get full and stay full from dinner on. Craves pasta, \"junk food\", and dessert. Emotional eating, especially with depression symptoms. Boredom eating - \"if has nothing to do will eat\". Drinks water, juice (1 glass), milk (1 glass), coffee (2 large espresso shaker from [facility]).      Dinner meal choices -  whatever foster mom makes  such as spaghetti     Feels snacking is bad and wants to work on. Gets hungry late at night. Wants to better control this.     Barriers to nutrition: not wanting to eat and not hungry    BM: IBS - C/D at baseline, typically more diarrhea. Has seen GI/Urology and PCP.     PA: walks/bikes to work (3 miles round trip)    - Limited by fatigue but wants to increase     Additional information:  PT -  at [employer]  Also volunteers at [facility] [employer]    Lives with foster family - 2 other foster sisters and 8 year old daughter per Vicenta Thorne's note        5/20/2024     3:48 PM   Diet Recall Review with Patient   How many glasses of juice do you drink in a typical day? 1   How many of glasses of milk do you drink in a typical day? 1   If you do drink milk, what type? 1%   How many 8oz glasses of sugar containing drinks such as Rajeev-Aid/sweet tea do you drink in a day? 0   How many " cans/bottles of sugar pop/soda/tea/sports drinks do you drink in a day? 2   How many cans/bottles of diet pop/soda/tea or sports drink do you drink in a day? 0   How often do you have a drink of alcohol? Monthly or Less   If you do drink, how many drinks might you have in a day? 1 or 2           5/20/2024     3:48 PM   Eating Habits   Generally, my meals include foods like these bread, pasta, rice, potatoes, corn, crackers, sweet dessert, pop, or juice Almost Everyday   Generally, my meals include foods like these fried meats, brats, burgers, french fries, pizza, cheese, chips, or ice cream A Few Times a Week   Eat fast food (like McDonalds, Burger Gen, [employer]) Once a Week   Eat at a buffet or sit-down restaurant Never   Eat most of my meals in front of the TV or computer A Few Times a Week   Often skip meals, eat at random times, have no regular eating times Everyday   Rarely sit down for a meal but snack or graze throughout Almost Everyday   Eat extra snacks between meals Almost Everyday   Eat most of my food at the end of the day Everyday   Eat in the middle of the night or wake up at night to eat Everyday   Eat extra snacks to prevent or correct low blood sugar Never   Eat to prevent acid reflux or stomach pain Never   Worry about not having enough food to eat Never   I eat when I am depressed Everyday   I eat when I am stressed Almost Everyday   I eat when I am bored Almost Everyday   I eat when I am anxious Everyday   I eat when I am happy or as a reward Everyday   I feel hungry all the time even if I just have eaten Everyday   Feeling full is important to me Less Than Weekly   I finish all the food on my plate even if I am already full Almost Everyday   I can't resist eating delicious food or walk past the good food/smell Once a Week   I eat/snack without noticing that I am eating Almost Everyday   I eat when I am preparing the meal Almost Everyday   I eat more than usual when I see others eating Almost  Everyday   I have trouble not eating sweets, ice cream, cookies, or chips if they are around the house Almost Everyday   I think about food all day Almost Everyday   What foods, if any, do you crave? Chips/Crackers   Please list any other foods you crave? Sweets           5/20/2024     3:48 PM   Amount of Food   I feel out of control when eating Almost Everyday   I eat a large amount of food, like a loaf of bread, a box of cookies, a pint/quart of ice cream, all at once Almost Everyday   I eat a large amount of food even when I am not hungry Weekly   I eat rapidly Everyday   I eat alone because I feel embarrassed and do not want others to see how much I have eaten Monthly   I eat until I am uncomfortably full Almost Everyday   I feel bad, disgusted, or guilty after I overeat Everyday         5/20/2024     3:48 PM   Activity/Exercise History   How much of a typical 12 hour day do you spend sitting? Less Than Half the Day   How much of a typical 12 hour day do you spend lying down? Less Than Half the Day   How much of a typical day do you spend walking/standing? Half the Day   How many hours (not including work) do you spend on the TV/Video Games/Computer/Tablet/Phone? 2-3 Hours   How many times a week are you active for the purpose of exercise? 2-3 Times a Week   What keeps you from being more active? Pain    Unsure What To Do    Other   How many total minutes do you spend doing some activity for the purpose of exercising when you exercise? 15-30 Minutes       Nutrition Prescription  Recommended energy/nutrient modification.    Nutrition Diagnosis  Obesity r/t long history of positive energy balance aeb BMI >30.    Nutrition Intervention  Reviewed current dietary habits and pts history   Discussed long-term goals pt hopes to accomplish in RD appointments  Answered pt questions  Coordination of care   Nutrition education   AVS and handouts via [employer]    Expected Engagement: good    Nutrition Goals  Activity goal:  consider looking into badminton or volleyball   Hydration goal: aim to pay more attention to current water consumption with goal to increase going forward   Nutrition goal: Aim for 3 meals/day as able    Items to consider at meals time:   Activa yogurt  String cheese  Toast with peanut butter  Wrap with deli meat  Wrap with eggs   *Could add fruit/vegetable to side or vegetables into wrap     Fruit ideas:  Strawberries  Neeraj  Apples  Bananas   Watermelon  Cantaloupe  Cuties    Tips for reflux:  - Eat slowly (20-30 minutes per meal), chewing foods well (25 chews per bite/applesauce consistency)  - Wait 3 hours after eating before lying down.  - Eat in a calm, relaxed place. Sit down while you eat  - Allow time between eating and sleeping. Eating too close to bed can cause reflux  - Reduce stress. Stress can cause reflux.    Possible reflux triggers:  - alcohol, particularly red wine.  - chocolate  - citrus (lemon, orange juice, etc.)  - black pepper  - garlic  - raw onions  - spicy foods  - coffee and caffeinated drinks, including tea and soda.  - peppermint.  - tomatoes  - carbonation      Follow-Up:  Wednesday, June 26th at 11:30 am    Time spent with patient: 30 minutes.  CHANELL Navarro, RD, LD

## 2024-05-29 ENCOUNTER — VIRTUAL VISIT (OUTPATIENT)
Dept: ENDOCRINOLOGY | Facility: CLINIC | Age: 24
End: 2024-05-29
Payer: COMMERCIAL

## 2024-05-29 VITALS — HEIGHT: 64 IN | WEIGHT: 268 LBS | BODY MASS INDEX: 45.75 KG/M2

## 2024-05-29 DIAGNOSIS — E66.813 CLASS 3 DRUG-INDUCED OBESITY WITH SERIOUS COMORBIDITY AND BODY MASS INDEX (BMI) OF 45.0 TO 49.9 IN ADULT (H): ICD-10-CM

## 2024-05-29 DIAGNOSIS — Z71.3 NUTRITIONAL COUNSELING: Primary | ICD-10-CM

## 2024-05-29 DIAGNOSIS — E66.1 CLASS 3 DRUG-INDUCED OBESITY WITH SERIOUS COMORBIDITY AND BODY MASS INDEX (BMI) OF 45.0 TO 49.9 IN ADULT (H): ICD-10-CM

## 2024-05-29 DIAGNOSIS — E66.01 MORBID OBESITY (H): ICD-10-CM

## 2024-05-29 PROCEDURE — 99207 PR NO CHARGE LOS: CPT | Mod: 95 | Performed by: DIETITIAN, REGISTERED

## 2024-05-29 PROCEDURE — 97802 MEDICAL NUTRITION INDIV IN: CPT | Mod: 95 | Performed by: DIETITIAN, REGISTERED

## 2024-05-29 ASSESSMENT — PAIN SCALES - GENERAL: PAINLEVEL: NO PAIN (0)

## 2024-05-29 NOTE — PATIENT INSTRUCTIONS
Nutrition Goals  Activity goal: consider looking into badminton or volleyball   Hydration goal: aim to pay more attention to current water consumption with goal to increase going forward   Nutrition goal: Aim for 3 meals/day as able    Items to consider at meals time:   Activa yogurt  String cheese  Toast with peanut butter  Wrap with deli meat  Wrap with eggs   *Could add fruit/vegetable to side or vegetables into wrap     Fruit ideas:  Strawberries  Neeraj  Apples  Bananas   Watermelon  Cantaloupe  Cuties    Tips for reflux:  - Eat slowly (20-30 minutes per meal), chewing foods well (25 chews per bite/applesauce consistency)  - Wait 3 hours after eating before lying down.  - Eat in a calm, relaxed place. Sit down while you eat  - Allow time between eating and sleeping. Eating too close to bed can cause reflux  - Reduce stress. Stress can cause reflux.    Possible reflux triggers:  - alcohol, particularly red wine.  - chocolate  - citrus (lemon, orange juice, etc.)  - black pepper  - garlic  - raw onions  - spicy foods  - coffee and caffeinated drinks, including tea and soda.  - peppermint.  - tomatoes  - carbonation      Follow-Up:  Wednesday, June 26th at 11:30 am    Tara Tomas (Duncan), CHANELL, RD, LD  Clinic #: [phone]

## 2024-05-29 NOTE — NURSING NOTE
Is the patient currently in the state of [place]? YES    Visit mode:VIDEO    If the visit is dropped, the patient can be reconnected by: VIDEO VISIT: Text to cell phone:   Telephone Information:   Mobile [phone]       Will anyone else be joining the visit? NO  (If patient encounters technical issues they should call [phone]    How would you like to obtain your AVS? MyChart    Are changes needed to the allergy or medication list? N/A    Are refills needed on medications prescribed by this physician? NO    Reason for visit: Consult (ILEANA Nutrition)    Nalini EDWARD

## 2024-05-29 NOTE — LETTER
"2024       RE: Regina Marshall  [address] [facility] [address]     Dear Colleague,    Thank you for referring your patient, Regina Marshall, to the [facility] [place] at [facility]. Please see a copy of my visit note below.    Video-Visit Details    Type of service:  Video Visit    Video Start Time: 11:35 am    Video End Time: 12:05 pm    Other participants:   Pricilla Bass RD present for visit.   Pt's foster mother also present for half of visit.     Originating Location (pt. Location): Home    Distant Location (provider location):  Offsite (providers home) [facility] [place]     Platform used for Video Visit: [email or web address]    New Weight Management Nutrition Consultation    Regina Marshall is a 24 year old female presents today for new weight management nutrition consultation.  Patient referred by Vicenta Ward PA-C on May 21, 2024.    Patient with Co-morbidities of obesity includin/20/2024     3:48 PM   --   I have the following health issues associated with obesity None of the above   I have the following symptoms associated with obesity Knee Pain    Depression    Back Pain    Fatigue    Groin Rash    Hip Pain         Anthropometrics:  Weight 24: 270 lbs with BMI 46.32    Estimated body mass index is 45.98 kg/m  as calculated from the following:    Height as of this encounter: 1.626 m (5' 4.02\").    Weight as of this encounter: 121.6 kg (268 lb).    Current weight: 268 lbs, pt report    Medications for Weight Loss:  Wegovy prescribed 24 - Nausea at first but improving. Due for dose again today. Took first last Wednesday.     Also takes Vyvanse     NUTRITION HISTORY  Food allergies: NKFA  Food intolerances: None  Does not like certain fruits/vegetables - texture related per pt. Does not like mushrooms/avocado for example.   Dx with GERD recently - was having N/V. Improved " "with weight loss in December per chart review but worse recently with larger portion sizes/eating at night.   Vitamin/Mineral Supplements: Did not discuss today  Previous methods of diet modification for weight loss: cleanse in December - was helpful per pt. Advocare cleanse, took cleanse pills and followed certain recipes or meal replacements.   RD before: Has worked with a nutritionist before but does not recall details - was young per pt.     Pt goals: weight loss to be healthier/feel better and be more active   - Wants to decrease night time snacking.    - Hoping for future breast reduction per chart review     Weight gain associated with Abilify. Not able to go off of per pt - has been super helpful in other areas.     Recently quit vaping - increased hunger since then.    Per PA note   Eating 1 large meal a day, with 2 snacks during the day and 1 snack after dinner. Hunger is well controlled during the day, but then really hungry from dinner on. Larger portions especially at dinner. Eats very quickly. I hard to get full and stay full from dinner on. Craves pasta, \"junk food\", and dessert. Emotional eating, especially with depression symptoms. Boredom eating - \"if has nothing to do will eat\". Drinks water, juice (1 glass), milk (1 glass), coffee (2 large espresso shaker from [employer]).      Dinner meal choices -  whatever foster mom makes  such as spaghetti     Feels snacking is bad and wants to work on. Gets hungry late at night. Wants to better control this.     Barriers to nutrition: not wanting to eat and not hungry    BM: IBS - C/D at baseline, typically more diarrhea. Has seen GI/Urology and PCP.     PA: walks/bikes to work (3 miles round trip)    - Limited by fatigue but wants to increase     Additional information:  PT -  at [employer]  Also volunteers at [employer]    Lives with foster family - 2 other foster sisters and 8 year old daughter per Vicenta Thorne's note        5/20/2024     3:48 PM "   Diet Recall Review with Patient   How many glasses of juice do you drink in a typical day? 1   How many of glasses of milk do you drink in a typical day? 1   If you do drink milk, what type? 1%   How many 8oz glasses of sugar containing drinks such as Rajeev-Aid/sweet tea do you drink in a day? 0   How many cans/bottles of sugar pop/soda/tea/sports drinks do you drink in a day? 2   How many cans/bottles of diet pop/soda/tea or sports drink do you drink in a day? 0   How often do you have a drink of alcohol? Monthly or Less   If you do drink, how many drinks might you have in a day? 1 or 2           5/20/2024     3:48 PM   Eating Habits   Generally, my meals include foods like these bread, pasta, rice, potatoes, corn, crackers, sweet dessert, pop, or juice Almost Everyday   Generally, my meals include foods like these fried meats, brats, burgers, french fries, pizza, cheese, chips, or ice cream A Few Times a Week   Eat fast food (like McDonalds, Burger Gen, Taco Bell) Once a Week   Eat at a buffet or sit-down restaurant Never   Eat most of my meals in front of the TV or computer A Few Times a Week   Often skip meals, eat at random times, have no regular eating times Everyday   Rarely sit down for a meal but snack or graze throughout Almost Everyday   Eat extra snacks between meals Almost Everyday   Eat most of my food at the end of the day Everyday   Eat in the middle of the night or wake up at night to eat Everyday   Eat extra snacks to prevent or correct low blood sugar Never   Eat to prevent acid reflux or stomach pain Never   Worry about not having enough food to eat Never   I eat when I am depressed Everyday   I eat when I am stressed Almost Everyday   I eat when I am bored Almost Everyday   I eat when I am anxious Everyday   I eat when I am happy or as a reward Everyday   I feel hungry all the time even if I just have eaten Everyday   Feeling full is important to me Less Than Weekly   I finish all the food on  my plate even if I am already full Almost Everyday   I can't resist eating delicious food or walk past the good food/smell Once a Week   I eat/snack without noticing that I am eating Almost Everyday   I eat when I am preparing the meal Almost Everyday   I eat more than usual when I see others eating Almost Everyday   I have trouble not eating sweets, ice cream, cookies, or chips if they are around the house Almost Everyday   I think about food all day Almost Everyday   What foods, if any, do you crave? Chips/Crackers   Please list any other foods you crave? Sweets           5/20/2024     3:48 PM   Amount of Food   I feel out of control when eating Almost Everyday   I eat a large amount of food, like a loaf of bread, a box of cookies, a pint/quart of ice cream, all at once Almost Everyday   I eat a large amount of food even when I am not hungry Weekly   I eat rapidly Everyday   I eat alone because I feel embarrassed and do not want others to see how much I have eaten Monthly   I eat until I am uncomfortably full Almost Everyday   I feel bad, disgusted, or guilty after I overeat Everyday         5/20/2024     3:48 PM   Activity/Exercise History   How much of a typical 12 hour day do you spend sitting? Less Than Half the Day   How much of a typical 12 hour day do you spend lying down? Less Than Half the Day   How much of a typical day do you spend walking/standing? Half the Day   How many hours (not including work) do you spend on the TV/Video Games/Computer/Tablet/Phone? 2-3 Hours   How many times a week are you active for the purpose of exercise? 2-3 Times a Week   What keeps you from being more active? Pain    Unsure What To Do    Other   How many total minutes do you spend doing some activity for the purpose of exercising when you exercise? 15-30 Minutes       Nutrition Prescription  Recommended energy/nutrient modification.    Nutrition Diagnosis  Obesity r/t long history of positive energy balance aeb BMI  >30.    Nutrition Intervention  Reviewed current dietary habits and pts history   Discussed long-term goals pt hopes to accomplish in RD appointments  Answered pt questions  Coordination of care   Nutrition education   AVS and handouts via [employer]    Expected Engagement: good    Nutrition Goals  Activity goal: consider looking into badminton or volleyball   Hydration goal: aim to pay more attention to current water consumption with goal to increase going forward   Nutrition goal: Aim for 3 meals/day as able    Items to consider at meals time:   Activa yogurt  String cheese  Toast with peanut butter  Wrap with deli meat  Wrap with eggs   *Could add fruit/vegetable to side or vegetables into wrap     Fruit ideas:  Strawberries  Neeraj  Apples  Bananas   Watermelon  Cantaloupe  Cuties    Tips for reflux:  - Eat slowly (20-30 minutes per meal), chewing foods well (25 chews per bite/applesauce consistency)  - Wait 3 hours after eating before lying down.  - Eat in a calm, relaxed place. Sit down while you eat  - Allow time between eating and sleeping. Eating too close to bed can cause reflux  - Reduce stress. Stress can cause reflux.    Possible reflux triggers:  - alcohol, particularly red wine.  - chocolate  - citrus (lemon, orange juice, etc.)  - black pepper  - garlic  - raw onions  - spicy foods  - coffee and caffeinated drinks, including tea and soda.  - peppermint.  - tomatoes  - carbonation      Follow-Up:  Wednesday, June 26th at 11:30 am    Time spent with patient: 30 minutes.  CHANELL Navarro, RD, LD

## 2024-05-30 NOTE — TELEPHONE ENCOUNTER
MTM referral outreach attempt #3    Outcome: Sent [employer] message with ability to schedule directly from there, as well as offered the opportunity to call me directly for help with scheduling.

## 2024-06-03 PROBLEM — E66.1 CLASS 3 DRUG-INDUCED OBESITY WITH SERIOUS COMORBIDITY AND BODY MASS INDEX (BMI) OF 45.0 TO 49.9 IN ADULT (H): Status: ACTIVE | Noted: 2024-06-03

## 2024-06-03 PROBLEM — E66.813 CLASS 3 DRUG-INDUCED OBESITY WITH SERIOUS COMORBIDITY AND BODY MASS INDEX (BMI) OF 45.0 TO 49.9 IN ADULT (H): Status: ACTIVE | Noted: 2024-06-03

## 2024-06-03 PROBLEM — E66.1 CLASS 3 DRUG-INDUCED OBESITY WITH SERIOUS COMORBIDITY AND BODY MASS INDEX (BMI) OF 45.0 TO 49.9 IN ADULT (H): Status: ACTIVE | Noted: 2022-02-09

## 2024-06-03 PROBLEM — E66.1 CLASS 3 DRUG-INDUCED OBESITY WITH SERIOUS COMORBIDITY AND BODY MASS INDEX (BMI) OF 45.0 TO 49.9 IN ADULT (H): Status: ACTIVE | Noted: 2023-03-30

## 2024-06-03 PROBLEM — E66.813 CLASS 3 DRUG-INDUCED OBESITY WITH SERIOUS COMORBIDITY AND BODY MASS INDEX (BMI) OF 45.0 TO 49.9 IN ADULT (H): Status: ACTIVE | Noted: 2023-03-30

## 2024-06-03 PROBLEM — E66.813 CLASS 3 DRUG-INDUCED OBESITY WITH SERIOUS COMORBIDITY AND BODY MASS INDEX (BMI) OF 45.0 TO 49.9 IN ADULT (H): Status: ACTIVE | Noted: 2022-02-09

## 2024-06-03 NOTE — ASSESSMENT & PLAN NOTE
Overweight onset at 17yo with start of Abilify. Previously weight stable around 140lbs and had no weight concerns. Since then weight gain has been gradual. Weight influenced by stress and mental health. Vaping cessation 1 bryanna ago, leading to increase hunger. Vyvanse controls hunger well during the day, leading to increase hunger at night. Able to lose 20lbs in December and has maintained around 10lbs of that weight loss. Is adopted, so minimal family hx. Wants to lose weight to improve overall health, feel better, and be more active.     Discussed AOMs to help with weight loss:   - Phentermine - contraindicated due to currently being on Vyvanse.   - Topiramate can be considered in the future with approval by psychiatrist. No hx of kidney disease or stones.   - Naltrexone can be considered in the future. No hx of liver disease. Not taking opioids.   - Wellbutrin - currently taking for mood. No side effects. No effect on weight.   - Metformin can be considered in the future. Concern for diarrhea side effect.   - GLP-1 to be started today. No hx of pancreatitis. No personal or known family hx of MTC or MENII. She has a hx of significant GERD that is moderately controlled on omeprazole 20mg BID. Symptoms improve when she does not have a snack at night and when she take her med at night. Symptoms also improve with weight loss. She will monitor symptoms very closely, may need to consider a change in PPI.

## 2024-06-03 NOTE — PATIENT INSTRUCTIONS
"Hi Z, it was nice to meet you today!  Thank you for allowing us the privilege of caring for you. We hope we provided you with the excellent service you deserve.   Please let us know if there is anything else we can do for you so that we can be sure you are completely satisfied with your care experience.    To ensure the quality of our services you may be receiving a patient satisfaction survey from an independent patient satisfaction monitoring company.    The greatest compliment you can give is a \"Likely to Recommend\"    Your visit was with Vicenta Ward PA-C today.    Instructions per today's visit:     Start Wegovy 0.25mg once weekly for 4 weeks, then increase to 0.5mg once weekly. Consider Zepbound and Saxenda as needed.  MTM pharmacist in 6 weeks   Mary Schwarz or Pricilla in 3 months   Vicenta Thorne PA-C in 6 months     ___________________________________________________________________________  Important contact and scheduling information:  Please call our contact center at [phone] to schedule your next appointments.  For any nursing questions or concerns call Nicole Medina LPN at [phone] or Summer Lucas RN at [phone]  Please call during clinic hours Monday through Friday 8:00a - 4:00p if you have questions or you can contact us via [employer] at anytime and we will reply during clinic hours.    Lab results will be communicated through My Chart or letter (if My Chart not used). Please call the clinic if you have not received communication after 1 week or if you have any questions.?  Clinic Fax: [phone]  __________________________________________________________________________    If labs were ordered today:    Please make an appointment to have them drawn at your convenience.     To schedule the Lab Appointment using [employer]:  Select \"Schedule an Appointment\"  Select \"Lab Only\"  For \"A couple of questions\", select \"Other\"  For \"Which locations work for you?, select the location and set up the " appointment    To schedule by phone call [phone] to schedule a lab only appointment at any [facility] lab.  ___________________________________________________________________________  Work with A Health !  Virtual Sessions are Available through [facility] Weight Management Clinics    To learn more, call to schedule a free, Health  Q&A appointment: [phone]     What is Health Coaching?  Do you know what you are supposed to do, but you just aren't doing it?  Then, HEALTH COACHING may help you!   Get unstuck and move forward with the support of a professionally trained NBC-HWC (National Board-Certified Health and ) who uses evidence-based approaches to help you move forward with healthy lifestyle changes in the areas of weight loss, stress management and overall well-being.    Health Coaches help you identify goals that will work best for you. Health Coaches provide support and encouragement with overcoming barriers and help you to find inspiration and motivation to lead a healthy lifestyle.    Option one:  Health Coaching 3-Pack; Three, 30-minute Health Coaching Visits, for $99  Visits are done virtually (phone or video)  This is a self pay service; we do not accept insurance for berny coaching.    Option two:   The 24 week Plan; 11 Health Coaching Visits, and a 7 months subscription to [employer]-- on-demand fitness, nutrition and mindfulness classes, for $499 (employee discounts may be available). Participants will also meet regularly with a weight management Medical Provider and a Registered/Licensed Dietician.  This is a self-pay service; we do not accept insurance for health coaching.    To Schedule a free Health  Q&A appointment to learn more,  call [phone].  ____________________________________________________________________  [facility]  Healthy Lifestyle Group    Healthy Lifestyle Group  This is a 60 minute  "virtual coaching group for those who want to lead a healthier lifestyle. Come together to set goals and overcome barriers in a supportive group environment. We will address the four pillars of health--nutrition, exercise, sleep and emotional well-being.  This group is highly recommended for those who are participating in the 24 week Healthy Lifestyle Plan and our Health Coaching sessions.    WHEN: This group meets the first Friday of the month, 12:30 PM - 1:30 PM online, via a zoom meeting.      FACILITATOR: Led by National Board Certified Health and , Pam Miles, [facility].    TO REGISTER: Please call the Call Center at [phone] to register. You will get an appointment to attend in [employer]. Fifteen minutes prior to the meeting, complete the e-check in and you will get the link to join the meeting.  There is no charge to attend this group and space is limited.      2023 and 2024 Meeting Topics and Dates:    November 3: Introduction to Mindfulness (Learn simple and effective mindfulness practices and how it can benefit you)    December 8: Let's Talk (guided discussion on our wins and challenges)    January 5: New Years Vision: Manifest your Best 2024! (Guided imagery,  journaling and discussion)    February 2: Let's Talk    March 1: 10 Percent Happier by Vijay Vicente (Book Bites; a guided discussion on the nuggets of wisdom from favorite wellness books; no need to read the book but highly encouraged)    April 5: Let's Talk    May 3: \"Essentialism; The Disciplined Pursuit of Less by Eddi Isaac (book bites discussion)    June 7: Let's Talk    July 5: NO MEETING, off for the 4th of July Holiday    August 2: The Blue Zones, Secrets for Living a Longer Life by Vijay Scott (book bites discussion)      If you would like bariatric surgery specific support group info please let your care team know.         Thank you,   [facility] Comprehensive Weight Management Team            WEGOVY " (semaglutide)    What is Wegovy?    Wegovy (semaglutide) injection 2.4 mg is an injectable prescription medicine used for adults with obesity (BMI ?30) or overweight (excess weight) (BMI ?27) who also have weight-related medical problems to help them lose weight and keep the weight off.    1.  Start Wegovy (semaglutide) 0.25 mg once weekly for 4 weeks, then if tolerating increase to 0.5 mg weekly for 4 weeks, then if tolerating increase to 1 mg weekly for 4 weeks, then if tolerating increase to 1.7 mg weekly for 4 weeks, then if tolerating increase to 2.4 mg weekly thereafter.    -Each Wegovy pen is a once weekly single-dose prefilled pen with a pen injector already built within the pen. Discard the Wegovy pen after use in sharps container.     2. Storage: make sure that when you get the prescription that you store the prescription in the refrigerator until it is time to use the Wegovy pen.  Once it is time to use the Wegovy pen, you can keep the pen at room temperature and it is good for up to 28 days at room temperature.     3.  Potential common side effects: nausea, headache, diarrhea, stomach upset.  If these become unmanageable or concerning symptoms, please make sure to call or mychart.    4. Wegovy should be discontinued for ?2 months prior to becoming pregnant.     Prior Authorization Process for Weight Management Medications: You are being prescribed a medication that will likely need to undergo a prior authorization.  A prior authorization is when the clinic needs to fill out a form that is sent to your insurance company to obtain coverage for that medication. The prescription will NOT automatically go to your pharmacy today if it needs a Prior Authorization. If the medication prior authorization is approved, the care team will contact you and the prescription will be released to your pharmacy. If denied, we will work to try and appeal the prior authorization if possible. The initial prior authorization  process takes up to 5-10 business days and appeals can take up to 30 days. If you do not hear from us at the end of that time, you may contact the clinic.    Go to site: Wegovy video to learn more and watch instruction videos.    For any questions or concerns please send a Zula message to our team or call our weight management call center at [phone] during regular business hours. For questions during evenings or weekends your messages will be addressed during the next business day.  For emergencies please call 911 or seek immediate medical care.

## 2024-06-26 ENCOUNTER — TELEPHONE (OUTPATIENT)
Dept: ENDOCRINOLOGY | Facility: CLINIC | Age: 24
End: 2024-06-26

## 2024-06-26 NOTE — TELEPHONE ENCOUNTER
Patient needs to be rescheduled for their virtual visit due to Reason for Reschedule: No-Show    Appointment mode: Video  Provider: Pricilla Bass RD    LVM x3 for check in, called alt number x1, went straight to ,  not set up, unable to reach patient for check in

## 2024-07-01 ENCOUNTER — MYC REFILL (OUTPATIENT)
Dept: FAMILY MEDICINE | Facility: CLINIC | Age: 24
End: 2024-07-01
Payer: COMMERCIAL

## 2024-07-01 DIAGNOSIS — R11.2 NAUSEA AND VOMITING, UNSPECIFIED VOMITING TYPE: ICD-10-CM

## 2024-07-01 DIAGNOSIS — K21.9 GASTROESOPHAGEAL REFLUX DISEASE WITHOUT ESOPHAGITIS: ICD-10-CM

## 2024-07-01 DIAGNOSIS — J30.9 ALLERGIC RHINITIS WITH POSTNASAL DRIP: ICD-10-CM

## 2024-07-01 DIAGNOSIS — R09.82 ALLERGIC RHINITIS WITH POSTNASAL DRIP: ICD-10-CM

## 2024-07-01 RX ORDER — ARIPIPRAZOLE 15 MG/1
15 TABLET ORAL DAILY
Status: CANCELLED | OUTPATIENT
Start: 2024-07-01

## 2024-07-01 RX ORDER — HYDROXYZINE HYDROCHLORIDE 50 MG/1
50 TABLET, FILM COATED ORAL 2 TIMES DAILY PRN
Status: CANCELLED | OUTPATIENT
Start: 2024-07-01

## 2024-07-01 RX ORDER — ONDANSETRON 8 MG/1
8 TABLET, FILM COATED ORAL EVERY 8 HOURS PRN
Qty: 30 TABLET | Refills: 1 | Status: SHIPPED | OUTPATIENT
Start: 2024-07-01 | End: 2024-07-08

## 2024-07-01 RX ORDER — CETIRIZINE HYDROCHLORIDE 10 MG/1
10 TABLET ORAL DAILY
Qty: 90 TABLET | Refills: 3 | Status: SHIPPED | OUTPATIENT
Start: 2024-07-01 | End: 2024-07-08

## 2024-07-01 RX ORDER — BUPROPION HYDROCHLORIDE 150 MG/1
TABLET ORAL
Status: CANCELLED | OUTPATIENT
Start: 2024-07-01

## 2024-07-01 RX ORDER — FEXOFENADINE HCL 180 MG/1
180 TABLET ORAL DAILY
Status: CANCELLED | OUTPATIENT
Start: 2024-07-01

## 2024-07-01 RX ORDER — SERTRALINE HYDROCHLORIDE 100 MG/1
100 TABLET, FILM COATED ORAL DAILY
Status: CANCELLED | OUTPATIENT
Start: 2024-07-01

## 2024-07-01 RX ORDER — MIRTAZAPINE 45 MG/1
TABLET, FILM COATED ORAL
Status: CANCELLED | OUTPATIENT
Start: 2024-07-01

## 2024-07-01 NOTE — PROGRESS NOTES
"Video-Visit Details    Type of service:  Video Visit    Video Start Time: 1:22 pm   Video End Time: 1:47 pm    Other participants:  Pt's foster mother also present for part of visit.     Originating Location (pt. Location): Home    Distant Location (provider location):  Offsite (providers home) [facility] [place]     Platform used for Video Visit: [employer]    New Weight Management Nutrition Consultation    Regina Marshall is a 24 year old female presents today for new weight management nutrition consultation.  Patient referred by Vicenta Ward PA-C on May 21, 2024.    Patient with Co-morbidities of obesity includin/20/2024     3:48 PM   --   I have the following health issues associated with obesity None of the above   I have the following symptoms associated with obesity Knee Pain    Depression    Back Pain    Fatigue    Groin Rash    Hip Pain         Anthropometrics:  Weight 24: 270 lbs with BMI 46.32    Estimated body mass index is 44.26 kg/m  as calculated from the following:    Height as of this encounter: 1.626 m (5' 4.02\").    Weight as of this encounter: 117 kg (258 lb).    Current weight: 258 lbs, pt report (down 10 lbs since last RD visit)    Medications for Weight Loss:  Wegovy prescribed 24 - 0.5 mg dose currently, no nausea    Also takes Vyvanse     NUTRITION HISTORY  Food allergies: NKFA  Food intolerances: None  Does not like certain fruits/vegetables - texture related per pt. Does not like mushrooms/avocado for example.   Dx with GERD recently - was having N/V. Improved with weight loss in December per chart review but worse recently with larger portion sizes/eating at night.   Vitamin/Mineral Supplements: Did not discuss today  Previous methods of diet modification for weight loss: cleanse in December - was helpful per pt. Advocare cleanse, took cleanse pills and followed certain recipes or meal replacements.   RD before: Has worked with a " "nutritionist before but does not recall details - was young per pt.     Pt goals: weight loss to be healthier/feel better and be more active   - Wants to decrease night time snacking.    - Hoping for future breast reduction per chart review     Weight gain associated with Abilify. Not able to go off of per pt - has been super helpful in other areas.     Recently quit vaping - increased hunger since then.    Per PA note   Eating 1 large meal a day, with 2 snacks during the day and 1 snack after dinner. Hunger is well controlled during the day, but then really hungry from dinner on. Larger portions especially at dinner. Eats very quickly. I hard to get full and stay full from dinner on. Craves pasta, \"junk food\", and dessert. Emotional eating, especially with depression symptoms. Boredom eating - \"if has nothing to do will eat\". Drinks water, juice (1 glass), milk (1 glass), coffee (2 large espresso shaker from [employer]).      Dinner meal choices -  whatever foster mom makes  such as spaghetti     Feels snacking is bad and wants to work on. Gets hungry late at night. Wants to better control this.     Barriers to nutrition: not wanting to eat and not hungry    BM: IBS - C/D at baseline, typically more diarrhea. Has seen GI/Urology and PCP.     PA: walks/bikes to work (3 miles round trip)    - Limited by fatigue but wants to increase     July 2024:  Not eating as much- only eating one meal right now and has been smaller portion. Has lifesaver here and there throughout the day. Not feeling as hungry in the morning, but then gets very hungry at night. Sometimes will have a protein shake.    Hydration: Coffee in AM but nothing else until 9:00, will drink 1 pop per day, drinks water 2 water bottles (24 oz- 48 total oz per day)    PA: Continues with walking/biking to work (3 miles)    Previous goals:  Activity goal: consider looking into badminton or volleyball - not able to look into this yet  Hydration goal: aim to pay more " attention to current water consumption with goal to increase going forward - Met  Nutrition goal: Aim for 3 meals/day as able - Not met    Additional information:  PT -  at [employer]  Also volunteers at [employer]    Lives with foster family - 2 other foster sisters and 8 year old daughter per Aleena's note        5/20/2024     3:48 PM   Diet Recall Review with Patient   How many glasses of juice do you drink in a typical day? 1   How many of glasses of milk do you drink in a typical day? 1   If you do drink milk, what type? 1%   How many 8oz glasses of sugar containing drinks such as Rajeev-Aid/sweet tea do you drink in a day? 0   How many cans/bottles of sugar pop/soda/tea/sports drinks do you drink in a day? 2   How many cans/bottles of diet pop/soda/tea or sports drink do you drink in a day? 0   How often do you have a drink of alcohol? Monthly or Less   If you do drink, how many drinks might you have in a day? 1 or 2           5/20/2024     3:48 PM   Eating Habits   Generally, my meals include foods like these bread, pasta, rice, potatoes, corn, crackers, sweet dessert, pop, or juice Almost Everyday   Generally, my meals include foods like these fried meats, brats, burgers, french fries, pizza, cheese, chips, or ice cream A Few Times a Week   Eat fast food (like McDonalds, [employer] Gen, Taco Bell) Once a Week   Eat at a buffet or sit-down restaurant Never   Eat most of my meals in front of the TV or computer A Few Times a Week   Often skip meals, eat at random times, have no regular eating times Everyday   Rarely sit down for a meal but snack or graze throughout Almost Everyday   Eat extra snacks between meals Almost Everyday   Eat most of my food at the end of the day Everyday   Eat in the middle of the night or wake up at night to eat Everyday   Eat extra snacks to prevent or correct low blood sugar Never   Eat to prevent acid reflux or stomach pain Never   Worry about not having enough food to  eat Never   I eat when I am depressed Everyday   I eat when I am stressed Almost Everyday   I eat when I am bored Almost Everyday   I eat when I am anxious Everyday   I eat when I am happy or as a reward Everyday   I feel hungry all the time even if I just have eaten Everyday   Feeling full is important to me Less Than Weekly   I finish all the food on my plate even if I am already full Almost Everyday   I can't resist eating delicious food or walk past the good food/smell Once a Week   I eat/snack without noticing that I am eating Almost Everyday   I eat when I am preparing the meal Almost Everyday   I eat more than usual when I see others eating Almost Everyday   I have trouble not eating sweets, ice cream, cookies, or chips if they are around the house Almost Everyday   I think about food all day Almost Everyday   What foods, if any, do you crave? Chips/Crackers   Please list any other foods you crave? Sweets           5/20/2024     3:48 PM   Amount of Food   I feel out of control when eating Almost Everyday   I eat a large amount of food, like a loaf of bread, a box of cookies, a pint/quart of ice cream, all at once Almost Everyday   I eat a large amount of food even when I am not hungry Weekly   I eat rapidly Everyday   I eat alone because I feel embarrassed and do not want others to see how much I have eaten Monthly   I eat until I am uncomfortably full Almost Everyday   I feel bad, disgusted, or guilty after I overeat Everyday         5/20/2024     3:48 PM   Activity/Exercise History   How much of a typical 12 hour day do you spend sitting? Less Than Half the Day   How much of a typical 12 hour day do you spend lying down? Less Than Half the Day   How much of a typical day do you spend walking/standing? Half the Day   How many hours (not including work) do you spend on the TV/Video Games/Computer/Tablet/Phone? 2-3 Hours   How many times a week are you active for the purpose of exercise? 2-3 Times a Week    What keeps you from being more active? Pain    Unsure What To Do    Other   How many total minutes do you spend doing some activity for the purpose of exercising when you exercise? 15-30 Minutes       Nutrition Prescription  Recommended energy/nutrient modification.    Nutrition Diagnosis  Obesity r/t long history of positive energy balance aeb BMI >30.    Nutrition Intervention  Reviewed current dietary habits and pts history   Discussed long-term goals pt hopes to accomplish in RD appointments  Answered pt questions  Coordination of care   Nutrition education   AVS and handouts via [employer]    Expected Engagement: good    Nutrition Goals  1. Activity goal: consider looking into badminton or volleyball   2. Nutrition goal: Aim for 2 eating periods per day (aim for protein/fiber)  3. Aim for 3 water bottles per day (64 oz total)    Foods to bring to work:  - Protein shake  - Protein bar  - Greek yogurt (focus on high protein/low sugar)  - [place] cheese/nut packs  - Canned tuna or chicken with plain nonfat greek yogurt with whole wheat crackers or toast  - Fruit (apples, grapes, strawberries, mangos, or see fruit ideas list below for other options)  - Vegetables (cucumbers, carrots, bell peppers, snap peas) with hummus or low fat ranch    Items to consider at meals time:   Activa yogurt  String cheese  Toast with peanut butter  Wrap with deli meat  Wrap with eggs   *Could add fruit/vegetable to side or vegetables into wrap     Fruit ideas:  Strawberries  Neeraj  Apples  Bananas   Watermelon  Cantaloupe  Cuties    Tips for reflux:  - Eat slowly (20-30 minutes per meal), chewing foods well (25 chews per bite/applesauce consistency)  - Wait 3 hours after eating before lying down.  - Eat in a calm, relaxed place. Sit down while you eat  - Allow time between eating and sleeping. Eating too close to bed can cause reflux  - Reduce stress. Stress can cause reflux.    Possible reflux triggers:  - alcohol, particularly red  wine.  - chocolate  - citrus (lemon, orange juice, etc.)  - black pepper  - garlic  - raw onions  - spicy foods  - coffee and caffeinated drinks, including tea and soda.  - peppermint.  - tomatoes  - carbonation        Follow-Up:  Wednesday, July 31st at 2:00 pm    Time spent with patient: 25 minutes.  GLADYS Quintanilla, RD, LD  Clinic #: [phone]

## 2024-07-01 NOTE — TELEPHONE ENCOUNTER
Patient came in stat she will be out of meds I will route to provider to review.  Sarah Michel MA 7/1/2024\

## 2024-07-02 ENCOUNTER — TELEPHONE (OUTPATIENT)
Dept: FAMILY MEDICINE | Facility: CLINIC | Age: 24
End: 2024-07-02

## 2024-07-02 ENCOUNTER — VIRTUAL VISIT (OUTPATIENT)
Dept: ENDOCRINOLOGY | Facility: CLINIC | Age: 24
End: 2024-07-02
Payer: COMMERCIAL

## 2024-07-02 VITALS — WEIGHT: 258 LBS | BODY MASS INDEX: 44.05 KG/M2 | HEIGHT: 64 IN

## 2024-07-02 DIAGNOSIS — E66.9 OBESITY: ICD-10-CM

## 2024-07-02 DIAGNOSIS — Z71.3 NUTRITIONAL COUNSELING: Primary | ICD-10-CM

## 2024-07-02 PROCEDURE — 97803 MED NUTRITION INDIV SUBSEQ: CPT | Mod: 95

## 2024-07-02 PROCEDURE — 99207 PR NO CHARGE LOS: CPT | Mod: 95

## 2024-07-02 NOTE — TELEPHONE ENCOUNTER
Patient states she has forms for intake for group home that she needs signed.    Patient will drop off at the .    Luci Velazco RN on 7/2/2024 at 12:38 PM

## 2024-07-02 NOTE — PATIENT INSTRUCTIONS
Ernesto MCKNIGHT,    Please follow-up with dietitian on Wednesday, July 31st at 2:00 pm.    Thank you,    Pricilla Bass, MPS, RD, LD  If you need to schedule or reschedule, please call [phone].    Nutrition Goals:  1. Activity goal: consider looking into badminton or volleyball   2. Nutrition goal: Aim for 2 eating periods per day (aim for protein/fiber)  3. Aim for 3 water bottles per day (64 oz total)    Foods to bring to work:  - Protein shake  - Protein bar  - Greek yogurt (focus on high protein/low sugar)  - [place] cheese/nut packs  - Canned tuna or chicken with plain nonfat greek yogurt with whole wheat crackers or toast  - Fruit (apples, grapes, strawberries, mangos, or see fruit ideas list below for other options)  - Vegetables (cucumbers, carrots, bell peppers, snap peas) with hummus or low fat ranch    Items to consider at meals time:   Activa yogurt  String cheese  Toast with peanut butter  Wrap with deli meat  Wrap with eggs   *Could add fruit/vegetable to side or vegetables into wrap     Fruit ideas:  Strawberries  Miner  Apples  Bananas   Watermelon  Cantaloupe  Cuties    Tips for reflux:  - Eat slowly (20-30 minutes per meal), chewing foods well (25 chews per bite/applesauce consistency)  - Wait 3 hours after eating before lying down.  - Eat in a calm, relaxed place. Sit down while you eat  - Allow time between eating and sleeping. Eating too close to bed can cause reflux  - Reduce stress. Stress can cause reflux.    Possible reflux triggers:  - alcohol, particularly red wine.  - chocolate  - citrus (lemon, orange juice, etc.)  - black pepper  - garlic  - raw onions  - spicy foods  - coffee and caffeinated drinks, including tea and soda.  - peppermint.  - tomatoes  - carbonation

## 2024-07-02 NOTE — NURSING NOTE
Current patient location: [address] [place] [id number]    Is the patient currently in the state of [place]? YES    Visit mode:VIDEO    If the visit is dropped, the patient can be reconnected by: VIDEO VISIT: Text to cell phone:   Telephone Information:   Mobile [phone]       Will anyone else be joining the visit? NO  (If patient encounters technical issues they should call [phone]    How would you like to obtain your AVS? MyChart    Are changes needed to the allergy or medication list? No    Are refills needed on medications prescribed by this physician? NO    Reason for visit: RECHLANDON EDWARD

## 2024-07-02 NOTE — TELEPHONE ENCOUNTER
Forms given to Argelia, one form was not filled out properly, foster mom will have it fixed and drop off a new one.

## 2024-07-02 NOTE — LETTER
"2024       RE: Regina Marshall  [address] [facility] [address]     Dear Colleague,    Thank you for referring your patient, Regina Marshall, to the [facility] [place] at [facility]. Please see a copy of my visit note below.    Video-Visit Details    Type of service:  Video Visit    Video Start Time: 1:22 pm   Video End Time: 1:47 pm    Other participants:  Pt's foster mother also present for part of visit.     Originating Location (pt. Location): Home    Distant Location (provider location):  Offsite (providers home) [facility]     Platform used for Video Visit: Cognio    New Weight Management Nutrition Consultation    Regina Marshall is a 24 year old female presents today for new weight management nutrition consultation.  Patient referred by Vicenta Ward PA-C on May 21, 2024.    Patient with Co-morbidities of obesity includin/20/2024     3:48 PM   --   I have the following health issues associated with obesity None of the above   I have the following symptoms associated with obesity Knee Pain    Depression    Back Pain    Fatigue    Groin Rash    Hip Pain         Anthropometrics:  Weight 24: 270 lbs with BMI 46.32    Estimated body mass index is 44.26 kg/m  as calculated from the following:    Height as of this encounter: 1.626 m (5' 4.02\").    Weight as of this encounter: 117 kg (258 lb).    Current weight: 258 lbs, pt report (down 10 lbs since last RD visit)    Medications for Weight Loss:  Wegovy prescribed 24 - 0.5 mg dose currently, no nausea    Also takes Vyvanse     NUTRITION HISTORY  Food allergies: NKFA  Food intolerances: None  Does not like certain fruits/vegetables - texture related per pt. Does not like mushrooms/avocado for example.   Dx with GERD recently - was having N/V. Improved with weight loss in December per chart review but worse recently " "with larger portion sizes/eating at night.   Vitamin/Mineral Supplements: Did not discuss today  Previous methods of diet modification for weight loss: cleanse in December - was helpful per pt. Advocare cleanse, took cleanse pills and followed certain recipes or meal replacements.   RD before: Has worked with a nutritionist before but does not recall details - was young per pt.     Pt goals: weight loss to be healthier/feel better and be more active   - Wants to decrease night time snacking.    - Hoping for future breast reduction per chart review     Weight gain associated with Abilify. Not able to go off of per pt - has been super helpful in other areas.     Recently quit vaping - increased hunger since then.    Per PA note   Eating 1 large meal a day, with 2 snacks during the day and 1 snack after dinner. Hunger is well controlled during the day, but then really hungry from dinner on. Larger portions especially at dinner. Eats very quickly. I hard to get full and stay full from dinner on. Craves pasta, \"junk food\", and dessert. Emotional eating, especially with depression symptoms. Boredom eating - \"if has nothing to do will eat\". Drinks water, juice (1 glass), milk (1 glass), coffee (2 large espresso shaker from [employer]).      Dinner meal choices -  whatever foster mom makes  such as spaamberetti     Feels snacking is bad and wants to work on. Gets hungry late at night. Wants to better control this.     Barriers to nutrition: not wanting to eat and not hungry    BM: IBS - C/D at baseline, typically more diarrhea. Has seen GI/Urology and PCP.     PA: walks/bikes to work (3 miles round trip)    - Limited by fatigue but wants to increase     July 2024:  Not eating as much- only eating one meal right now and has been smaller portion. Has lifesaver here and there throughout the day. Not feeling as hungry in the morning, but then gets very hungry at night. Sometimes will have a protein shake.    Hydration: Coffee in AM " but nothing else until 9:00, will drink 1 pop per day, drinks water 2 water bottles (24 oz- 48 total oz per day)    PA: Continues with walking/biking to work (3 miles)    Previous goals:  Activity goal: consider looking into badminton or volleyball - not able to look into this yet  Hydration goal: aim to pay more attention to current water consumption with goal to increase going forward - Met  Nutrition goal: Aim for 3 meals/day as able - Not met    Additional information:  PT -  at [employer]  Also volunteers at [employer]    Lives with foster family - 2 other foster sisters and 8 year old daughter per Vicenta Thorne's note        5/20/2024     3:48 PM   Diet Recall Review with Patient   How many glasses of juice do you drink in a typical day? 1   How many of glasses of milk do you drink in a typical day? 1   If you do drink milk, what type? 1%   How many 8oz glasses of sugar containing drinks such as Rajeev-Aid/sweet tea do you drink in a day? 0   How many cans/bottles of sugar pop/soda/tea/sports drinks do you drink in a day? 2   How many cans/bottles of diet pop/soda/tea or sports drink do you drink in a day? 0   How often do you have a drink of alcohol? Monthly or Less   If you do drink, how many drinks might you have in a day? 1 or 2           5/20/2024     3:48 PM   Eating Habits   Generally, my meals include foods like these bread, pasta, rice, potatoes, corn, crackers, sweet dessert, pop, or juice Almost Everyday   Generally, my meals include foods like these fried meats, brats, burgers, french fries, pizza, cheese, chips, or ice cream A Few Times a Week   Eat fast food (like McDonalds, Burger Gen, Taco Bell) Once a Week   Eat at a buffet or sit-down restaurant Never   Eat most of my meals in front of the TV or computer A Few Times a Week   Often skip meals, eat at random times, have no regular eating times Everyday   Rarely sit down for a meal but snack or graze throughout Almost Everyday   Eat  extra snacks between meals Almost Everyday   Eat most of my food at the end of the day Everyday   Eat in the middle of the night or wake up at night to eat Everyday   Eat extra snacks to prevent or correct low blood sugar Never   Eat to prevent acid reflux or stomach pain Never   Worry about not having enough food to eat Never   I eat when I am depressed Everyday   I eat when I am stressed Almost Everyday   I eat when I am bored Almost Everyday   I eat when I am anxious Everyday   I eat when I am happy or as a reward Everyday   I feel hungry all the time even if I just have eaten Everyday   Feeling full is important to me Less Than Weekly   I finish all the food on my plate even if I am already full Almost Everyday   I can't resist eating delicious food or walk past the good food/smell Once a Week   I eat/snack without noticing that I am eating Almost Everyday   I eat when I am preparing the meal Almost Everyday   I eat more than usual when I see others eating Almost Everyday   I have trouble not eating sweets, ice cream, cookies, or chips if they are around the house Almost Everyday   I think about food all day Almost Everyday   What foods, if any, do you crave? Chips/Crackers   Please list any other foods you crave? Sweets           5/20/2024     3:48 PM   Amount of Food   I feel out of control when eating Almost Everyday   I eat a large amount of food, like a loaf of bread, a box of cookies, a pint/quart of ice cream, all at once Almost Everyday   I eat a large amount of food even when I am not hungry Weekly   I eat rapidly Everyday   I eat alone because I feel embarrassed and do not want others to see how much I have eaten Monthly   I eat until I am uncomfortably full Almost Everyday   I feel bad, disgusted, or guilty after I overeat Everyday         5/20/2024     3:48 PM   Activity/Exercise History   How much of a typical 12 hour day do you spend sitting? Less Than Half the Day   How much of a typical 12 hour  day do you spend lying down? Less Than Half the Day   How much of a typical day do you spend walking/standing? Half the Day   How many hours (not including work) do you spend on the TV/Video Games/Computer/Tablet/Phone? 2-3 Hours   How many times a week are you active for the purpose of exercise? 2-3 Times a Week   What keeps you from being more active? Pain    Unsure What To Do    Other   How many total minutes do you spend doing some activity for the purpose of exercising when you exercise? 15-30 Minutes       Nutrition Prescription  Recommended energy/nutrient modification.    Nutrition Diagnosis  Obesity r/t long history of positive energy balance aeb BMI >30.    Nutrition Intervention  Reviewed current dietary habits and pts history   Discussed long-term goals pt hopes to accomplish in RD appointments  Answered pt questions  Coordination of care   Nutrition education   AVS and handouts via [employer]    Expected Engagement: good    Nutrition Goals  1. Activity goal: consider looking into badminton or volleyball   2. Nutrition goal: Aim for 2 eating periods per day (aim for protein/fiber)  3. Aim for 3 water bottles per day (64 oz total)    Foods to bring to work:  - Protein shake  - Protein bar  - Greek yogurt (focus on high protein/low sugar)  - [place] cheese/nut packs  - Canned tuna or chicken with plain nonfat greek yogurt with whole wheat crackers or toast  - Fruit (apples, grapes, strawberries, mangos, or see fruit ideas list below for other options)  - Vegetables (cucumbers, carrots, bell peppers, snap peas) with hummus or low fat ranch    Items to consider at meals time:   Activa yogurt  String cheese  Toast with peanut butter  Wrap with deli meat  Wrap with eggs   *Could add fruit/vegetable to side or vegetables into wrap     Fruit ideas:  Strawberries  Neeraj  Apples  Bananas   Watermelon  Cantaloupe  Cuties    Tips for reflux:  - Eat slowly (20-30 minutes per meal), chewing foods well (25 chews per  bite/applesauce consistency)  - Wait 3 hours after eating before lying down.  - Eat in a calm, relaxed place. Sit down while you eat  - Allow time between eating and sleeping. Eating too close to bed can cause reflux  - Reduce stress. Stress can cause reflux.    Possible reflux triggers:  - alcohol, particularly red wine.  - chocolate  - citrus (lemon, orange juice, etc.)  - black pepper  - garlic  - raw onions  - spicy foods  - coffee and caffeinated drinks, including tea and soda.  - peppermint.  - tomatoes  - carbonation        Follow-Up:  Wednesday, July 31st at 2:00 pm    Time spent with patient: 25 minutes.  GLADYS Quintanilla, RD, LD  Clinic #: [phone]

## 2024-07-03 NOTE — TELEPHONE ENCOUNTER
Forms/Letter Request    Type of form/letter: OTHER: Intake for group home       Do we have the form/letter: Yes: in folder    Who is the form from? patient    Where did/will the form come from? Patient or family brought in       When is form/letter needed by: ASAP    How would you like the form/letter returned:     Patient Notified form requests are processed in 5-7 business days:Yes    Could we send this information to you in [employer] or would you prefer to receive a phone call?:   [employer]

## 2024-07-05 ENCOUNTER — TELEPHONE (OUTPATIENT)
Dept: FAMILY MEDICINE | Facility: CLINIC | Age: 24
End: 2024-07-05
Payer: COMMERCIAL

## 2024-07-05 NOTE — TELEPHONE ENCOUNTER
Patient called regarding this documentation. Informed patient it is currently being worked on and is not at the  as of now. Patient verbalized understanding and no further questions at this time.    Jennifer Young RN on 7/5/2024 at 5:04 PM

## 2024-07-05 NOTE — TELEPHONE ENCOUNTER
Forms/Letter Request    Type of form/letter: OTHER: Pt is needing written scripts (for meds) emailed to [facility] in [place]       Do we have the form/letter: No    Who is the form from? See above (if other please explain)    Where did/will the form come from? na    When is form/letter needed by: today or monday    How would you like the form/letter returned:  email to [email or web address]    Patient Notified form requests are processed in 5-7 business days:Yes    Could we send this information to you in [facility] or would you prefer to receive a phone call?:   Patient would prefer a phone call   Okay to leave a detailed message?: Yes at Home number on file [phone] (home) or [phone]

## 2024-07-05 NOTE — TELEPHONE ENCOUNTER
Patient is needing HARD COPIES of the medications that you prescribe placed at  to .    She is wanting to  TODAY if possible, but Monday at the latest.    Also looking for other paperwork that was dropped off.    Maria E ROBBO/

## 2024-07-07 NOTE — TELEPHONE ENCOUNTER
One of the pages she gave us had someone else's name on it so her foster mom was going to drop off a new one and as of Friday when I left at 2 it was not there.  Otherwise the rest of the forms are complete.  Argelia Castaneda, CNP

## 2024-07-08 ENCOUNTER — TELEPHONE (OUTPATIENT)
Dept: FAMILY MEDICINE | Facility: CLINIC | Age: 24
End: 2024-07-08
Payer: COMMERCIAL

## 2024-07-08 ENCOUNTER — MYC MEDICAL ADVICE (OUTPATIENT)
Dept: FAMILY MEDICINE | Facility: CLINIC | Age: 24
End: 2024-07-08
Payer: COMMERCIAL

## 2024-07-08 DIAGNOSIS — R11.2 NAUSEA AND VOMITING, UNSPECIFIED VOMITING TYPE: ICD-10-CM

## 2024-07-08 DIAGNOSIS — J30.9 ALLERGIC RHINITIS WITH POSTNASAL DRIP: ICD-10-CM

## 2024-07-08 DIAGNOSIS — R09.82 ALLERGIC RHINITIS WITH POSTNASAL DRIP: ICD-10-CM

## 2024-07-08 DIAGNOSIS — K21.9 GASTROESOPHAGEAL REFLUX DISEASE WITHOUT ESOPHAGITIS: ICD-10-CM

## 2024-07-08 RX ORDER — CETIRIZINE HYDROCHLORIDE 10 MG/1
10 TABLET ORAL DAILY
Qty: 90 TABLET | Refills: 3 | Status: SHIPPED | OUTPATIENT
Start: 2024-07-08

## 2024-07-08 RX ORDER — ONDANSETRON 8 MG/1
8 TABLET, FILM COATED ORAL EVERY 8 HOURS PRN
Qty: 30 TABLET | Refills: 1 | Status: SHIPPED | OUTPATIENT
Start: 2024-07-08

## 2024-07-15 ENCOUNTER — TELEPHONE (OUTPATIENT)
Dept: ENDOCRINOLOGY | Facility: CLINIC | Age: 24
End: 2024-07-15
Payer: COMMERCIAL

## 2024-07-15 ENCOUNTER — TELEPHONE (OUTPATIENT)
Dept: FAMILY MEDICINE | Facility: CLINIC | Age: 24
End: 2024-07-15
Payer: COMMERCIAL

## 2024-07-15 ENCOUNTER — MYC MEDICAL ADVICE (OUTPATIENT)
Dept: FAMILY MEDICINE | Facility: CLINIC | Age: 24
End: 2024-07-15
Payer: COMMERCIAL

## 2024-07-15 DIAGNOSIS — Z13.220 LIPID SCREENING: ICD-10-CM

## 2024-07-15 DIAGNOSIS — E55.9 HYPOVITAMINOSIS D: Primary | ICD-10-CM

## 2024-07-15 DIAGNOSIS — E66.01 MORBID OBESITY (H): ICD-10-CM

## 2024-07-15 DIAGNOSIS — R79.89 ELEVATED SERUM CREATININE: ICD-10-CM

## 2024-07-15 RX ORDER — CHOLECALCIFEROL (VITAMIN D3) 50 MCG
1 TABLET ORAL DAILY
Qty: 90 TABLET | Refills: 2 | Status: SHIPPED | OUTPATIENT
Start: 2024-07-15 | End: 2024-09-20

## 2024-07-15 NOTE — TELEPHONE ENCOUNTER
New Medication Request        What medication are you requesting?: Wegovy 0.75 mg    Reason for medication request: pt need next dose up for Wegovy    Have you taken this medication before?: Yes: lower dose    Controlled Substance Agreement on file:   CSA -- Patient Level:    CSA: None found at the patient level.         Patient offered an appointment? No    Preferred Pharmacy:   [facility]. [facility] [address] [address] [facility] [address] [facility] [address]  Phone: [phone] Fax: [phone]      Could we send this information to you in [employer] or would you prefer to receive a phone call?:   Patient would prefer a phone call   Okay to leave a detailed message?: Yes at Cell number on file:    Telephone Information:   Mobile [phone]

## 2024-07-15 NOTE — TELEPHONE ENCOUNTER
Patient last seen you 4/18/24.     Patient called in requesting a prescription for vitamin D3. She states she takes this OTC, but her new group home will not administer/allow her to take it without a written prescription from her provider. She states she has not been able to take it for about a week due to this.    Patient states she takes 5,000 units per day. Patient was advised that writer does not see that strength in the system to order, so patient is requesting the highest possible daily dose to be ordered.     Patient states she has not ever had her vitamin D level checked, and that the dose she takes is just something she chose, it was not recommended by a provider. Writer did advise patient that she may need an appointment and/or labs for PCP to prescribe this.     Pended vitamin D3 2,000 units daily for provider review.     Patient would like a return call.     Germaine Rodney, PUNEETN, RN

## 2024-07-15 NOTE — TELEPHONE ENCOUNTER
I did send in the 2000 unit script.  I want her to get fasting labs before her physical in August.  We will check vitamin D at that time.  Please help her schedule.  Argelia Castaneda, CNP

## 2024-07-15 NOTE — TELEPHONE ENCOUNTER
[employer] message sent to patient for Wegovy check in. Refills on file. Appointment with [facility] is not until September.

## 2024-07-16 DIAGNOSIS — E66.813 CLASS 3 DRUG-INDUCED OBESITY WITH SERIOUS COMORBIDITY AND BODY MASS INDEX (BMI) OF 45.0 TO 49.9 IN ADULT (H): Primary | ICD-10-CM

## 2024-07-16 DIAGNOSIS — E66.1 CLASS 3 DRUG-INDUCED OBESITY WITH SERIOUS COMORBIDITY AND BODY MASS INDEX (BMI) OF 45.0 TO 49.9 IN ADULT (H): Primary | ICD-10-CM

## 2024-07-18 ENCOUNTER — VIRTUAL VISIT (OUTPATIENT)
Dept: ENDOCRINOLOGY | Facility: CLINIC | Age: 24
End: 2024-07-18
Payer: COMMERCIAL

## 2024-07-18 ENCOUNTER — LAB (OUTPATIENT)
Dept: LAB | Facility: CLINIC | Age: 24
End: 2024-07-18
Payer: COMMERCIAL

## 2024-07-18 VITALS — WEIGHT: 256 LBS | HEIGHT: 64 IN | BODY MASS INDEX: 43.71 KG/M2

## 2024-07-18 DIAGNOSIS — R79.89 ELEVATED SERUM CREATININE: ICD-10-CM

## 2024-07-18 DIAGNOSIS — E66.01 MORBID OBESITY (H): ICD-10-CM

## 2024-07-18 DIAGNOSIS — Z13.220 LIPID SCREENING: ICD-10-CM

## 2024-07-18 DIAGNOSIS — E66.813 CLASS 3 DRUG-INDUCED OBESITY WITH SERIOUS COMORBIDITY AND BODY MASS INDEX (BMI) OF 45.0 TO 49.9 IN ADULT (H): Primary | ICD-10-CM

## 2024-07-18 DIAGNOSIS — E55.9 HYPOVITAMINOSIS D: ICD-10-CM

## 2024-07-18 DIAGNOSIS — Z11.3 SCREENING FOR STDS (SEXUALLY TRANSMITTED DISEASES): Primary | ICD-10-CM

## 2024-07-18 DIAGNOSIS — E66.1 CLASS 3 DRUG-INDUCED OBESITY WITH SERIOUS COMORBIDITY AND BODY MASS INDEX (BMI) OF 45.0 TO 49.9 IN ADULT (H): Primary | ICD-10-CM

## 2024-07-18 LAB
ANION GAP SERPL CALCULATED.3IONS-SCNC: 11 MMOL/L (ref 7–15)
BUN SERPL-MCNC: 9.9 MG/DL (ref 6–20)
CALCIUM SERPL-MCNC: 9.3 MG/DL (ref 8.8–10.4)
CHLORIDE SERPL-SCNC: 101 MMOL/L (ref 98–107)
CHOLEST SERPL-MCNC: 179 MG/DL
CREAT SERPL-MCNC: 0.76 MG/DL (ref 0.51–0.95)
EGFRCR SERPLBLD CKD-EPI 2021: >90 ML/MIN/1.73M2
FASTING STATUS PATIENT QL REPORTED: NO
FASTING STATUS PATIENT QL REPORTED: NO
GLUCOSE SERPL-MCNC: 87 MG/DL (ref 70–99)
HBA1C MFR BLD: 5.1 % (ref 0–5.6)
HCO3 SERPL-SCNC: 24 MMOL/L (ref 22–29)
HDLC SERPL-MCNC: 63 MG/DL
LDLC SERPL CALC-MCNC: 100 MG/DL
NONHDLC SERPL-MCNC: 116 MG/DL
POTASSIUM SERPL-SCNC: 4.3 MMOL/L (ref 3.4–5.3)
SODIUM SERPL-SCNC: 136 MMOL/L (ref 135–145)
TRIGL SERPL-MCNC: 79 MG/DL

## 2024-07-18 PROCEDURE — 80048 BASIC METABOLIC PNL TOTAL CA: CPT

## 2024-07-18 PROCEDURE — 36415 COLL VENOUS BLD VENIPUNCTURE: CPT

## 2024-07-18 PROCEDURE — 99213 OFFICE O/P EST LOW 20 MIN: CPT | Mod: 95 | Performed by: NURSE PRACTITIONER

## 2024-07-18 PROCEDURE — 83036 HEMOGLOBIN GLYCOSYLATED A1C: CPT

## 2024-07-18 PROCEDURE — 82306 VITAMIN D 25 HYDROXY: CPT

## 2024-07-18 PROCEDURE — 80061 LIPID PANEL: CPT

## 2024-07-18 ASSESSMENT — PAIN SCALES - GENERAL: PAINLEVEL: NO PAIN (0)

## 2024-07-18 NOTE — PROGRESS NOTES
Return Medical Weight Management Note     Regina Marshall  MRN:  [id number]  :  2000  TRISTAN:  2024    Dear Guerline RÍOS, Flex REINA,, MARIAM CNP,    I had the pleasure of seeing your patient Regina Marshall. 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 Big transition with eating 3 meals a day (was eating 1 per day) since moving to group home. Feeling good about change. Some nausea with 1mg wegovy so would recommend staying here for now. Discussed increase if needed prior to follow up.     Continue wegovy 1mg   Great work eatnig 3 meals a day   Great work walking   Follow up 3 months with Vicenta Ward PA-C    Can consider dose increase if hunger/ cravings worse before follow up                INTERVAL HISTORY:  [facility] 2024 BMI 46 -weight gain when starting Abilify as a teen. with joint pain, depression, fatigue, anxiety, ADHD  Anti-obesity medication history    Current:   Wegovy 1mg -started yesterday   -occasional nausea requiring zofran- less than once a week - usually the evening after taking injection in the AM   -no vomiting  -no heart burn   -no constipation/ diarrhea     Bupropion 150mg (mood)       Recent diet changes:   3 meals a day since moving into group home - smaller meals   No snacking between meals   Less late night snacking   Payton at meals times     Recent exercise/activity changes:   Walking every other day     Recent stressors:   Less stress recently - other than acutally  moving     Recent sleep changes: no issues     Vitamins/Labs: 5/2024    CURRENT WEIGHT:   256 lbs 0 oz    Initial Weight (lbs): 266 lbs  Last Visits Weight: 117 kg (258 lb)  Cumulative weight loss (lbs): 10  Weight Loss Percentage: 3.76%        7/17/2024    12:32 PM   Changes and Difficulties   I have made the following changes to my diet since my last visit: More fruit and consistency eating meals   With regards to my diet, I am still struggling with: Eating late   I have made the following changes to my activity/exercise since my last visit: Walking more   With regards to my activity/exercise, I am still struggling with: Working out         MEDICATIONS:   Current Outpatient Medications   Medication Sig Dispense Refill    ARIPiprazole (ABILIFY) 15 MG tablet Take 15 mg by mouth daily      Ascorbic Acid (VITAMIN C ADULT GUMMIES PO)       buPROPion (WELLBUTRIN XL) 150 MG 24 hr tablet TAKE ONE TABLET BY MOUTH EVERY MORNING WITH 300MG TABLET TO EQUAL 450MG DOSE      cetirizine (ZYRTEC) 10 MG tablet Take 1 tablet (10 mg) by mouth daily 90 tablet 3    fexofenadine (ALLEGRA) 180 MG tablet Take 180 mg by mouth daily      hydrOXYzine (ATARAX) 50 MG tablet Take 1 tablet (50 mg) by mouth 2 times daily as needed      levonorgestrel (KYLEENA) 19.5 MG IUD 1 each by Intrauterine route once      mirtazapine (REMERON) 22.5 mg half-tab       omeprazole (PRILOSEC) 20 MG DR capsule Take 1 capsule (20 mg) by mouth 2 times daily 180 capsule 3    ondansetron (ZOFRAN) 8 MG tablet Take 1 tablet (8 mg) by mouth every 8 hours as needed for nausea 30 tablet 1    Semaglutide-Weight Management (WEGOVY) 1 MG/0.5ML pen Inject 1 mg subcutaneously once a week 2 mL 3    sertraline (ZOLOFT) 100 MG tablet Take 100 mg by mouth daily Takes 2 tablet daily. 200mg.      vitamin D3 (CHOLECALCIFEROL) 50 mcg (2000 units) tablet Take 1 tablet (50 mcg) by mouth daily 90 tablet 2           7/17/2024    12:32 PM   Weight Loss Medication History Reviewed With  Patient   Which weight loss medications are you currently taking on a regular basis? Vyvanse    Wegovy   Are you having any side effects from the weight loss medication that we have prescribed you? No       Admission on 05/02/2024, Discharged on 05/02/2024   Component Date Value Ref Range Status    Sodium 05/02/2024 140  135 - 145 mmol/L Final    Reference intervals for this test were updated on 09/26/2023 to more accurately reflect our healthy population. There may be differences in the flagging of prior results with similar values performed with this method. Interpretation of those prior results can be made in the context of the updated reference intervals.     Potassium 05/02/2024 5.3  3.4 - 5.3 mmol/L Final    Specimen slightly hemolyzed. The reported potassium value may be falsely elevated. Analysis of a non-hemolyzed specimen (i.e. re-draw) may result in a lower potassium value.    Carbon Dioxide (CO2) 05/02/2024 17 (L)  22 - 29 mmol/L Final    Anion Gap 05/02/2024 20 (H)  7 - 15 mmol/L Final    Urea Nitrogen 05/02/2024 14.6  6.0 - 20.0 mg/dL Final    Creatinine 05/02/2024 1.01 (H)  0.51 - 0.95 mg/dL Final    GFR Estimate 05/02/2024 80  >60 mL/min/1.73m2 Final    Calcium 05/02/2024 9.2  8.6 - 10.0 mg/dL Final    Chloride 05/02/2024 103  98 - 107 mmol/L Final    Glucose 05/02/2024 85  70 - 99 mg/dL Final    Alkaline Phosphatase 05/02/2024 107  40 - 150 U/L Final    Reference intervals for this test were updated on 11/14/2023 to more accurately reflect our healthy population. There may be differences in the flagging of prior results with similar values performed with this method. Interpretation of those prior results can be made in the context of the updated reference intervals.    AST 05/02/2024    Final    Unsatisfactory specimen - hemolyzed   Reference intervals for this test were updated on 6/12/2023 to more accurately reflect our healthy population. There may be differences in the flagging of prior results  with similar values performed with this method. Interpretation of those prior results can be made in the context of the updated reference intervals.    ALT 05/02/2024 28  0 - 50 U/L Final    Reference intervals for this test were updated on 6/12/2023 to more accurately reflect our healthy population. There may be differences in the flagging of prior results with similar values performed with this method. Interpretation of those prior results can be made in the context of the updated reference intervals.      Protein Total 05/02/2024 9.1 (H)  6.4 - 8.3 g/dL Final    Albumin 05/02/2024 4.4  3.5 - 5.2 g/dL Final    Bilirubin Total 05/02/2024 0.2  <=1.2 mg/dL Final    Hold Specimen 05/02/2024 JIC   Final    WBC Count 05/02/2024 10.3  4.0 - 11.0 10e3/uL Final    RBC Count 05/02/2024 4.99  3.80 - 5.20 10e6/uL Final    Hemoglobin 05/02/2024 14.2  11.7 - 15.7 g/dL Final    Hematocrit 05/02/2024 44.0  35.0 - 47.0 % Final    MCV 05/02/2024 88  78 - 100 fL Final    MCH 05/02/2024 28.5  26.5 - 33.0 pg Final    MCHC 05/02/2024 32.3  31.5 - 36.5 g/dL Final    RDW 05/02/2024 13.4  10.0 - 15.0 % Final    Platelet Count 05/02/2024 306  150 - 450 10e3/uL Final    % Neutrophils 05/02/2024 73  % Final    % Lymphocytes 05/02/2024 19  % Final    % Monocytes 05/02/2024 6  % Final    % Eosinophils 05/02/2024 1  % Final    % Basophils 05/02/2024 0  % Final    % Immature Granulocytes 05/02/2024 0  % Final    NRBCs per 100 WBC 05/02/2024 0  <1 /100 Final    Absolute Neutrophils 05/02/2024 7.5  1.6 - 8.3 10e3/uL Final    Absolute Lymphocytes 05/02/2024 2.0  0.8 - 5.3 10e3/uL Final    Absolute Monocytes 05/02/2024 0.6  0.0 - 1.3 10e3/uL Final    Absolute Eosinophils 05/02/2024 0.1  0.0 - 0.7 10e3/uL Final    Absolute Basophils 05/02/2024 0.0  0.0 - 0.2 10e3/uL Final    Absolute Immature Granulocytes 05/02/2024 0.0  <=0.4 10e3/uL Final    Absolute NRBCs 05/02/2024 0.0  10e3/uL Final    Lipase 05/02/2024 52  13 - 60 U/L Final    Color Urine  "05/02/2024 Straw  Colorless, Straw, Light Yellow, Yellow Final    Appearance Urine 05/02/2024 Clear  Clear Final    Glucose Urine 05/02/2024 Negative  Negative mg/dL Final    Bilirubin Urine 05/02/2024 Negative  Negative Final    Ketones Urine 05/02/2024 Negative  Negative mg/dL Final    Specific Gravity Urine 05/02/2024 1.012  1.003 - 1.035 Final    Blood Urine 05/02/2024 Negative  Negative Final    pH Urine 05/02/2024 5.0  5.0 - 7.0 Final    Protein Albumin Urine 05/02/2024 Negative  Negative mg/dL Final    Urobilinogen Urine 05/02/2024 Normal  Normal, 2.0 mg/dL Final    Nitrite Urine 05/02/2024 Negative  Negative Final    Leukocyte Esterase Urine 05/02/2024 Negative  Negative Final    Mucus Urine 05/02/2024 Present (A)  None Seen /LPF Final    RBC Urine 05/02/2024 0  <=2 /HPF Final    WBC Urine 05/02/2024 0  <=5 /HPF Final    Squamous Epithelials Urine 05/02/2024 2 (H)  <=1 /HPF Final    hCG Urine Qualitative 05/02/2024 Negative  Negative Final    This test is for screening purposes.  Results should be interpreted along with the clinical picture.  Confirmation testing is available if warranted by ordering [id number], HCG Quantitative Pregnancy.           5/20/2024     3:55 PM   ROHIT Score (Last Two)   ROHIT Raw Score 30   Activation Score 56   ROHIT Level 3         PHYSICAL EXAM:  Objective    Ht 1.626 m (5' 4\")   Wt 116.1 kg (256 lb)   BMI 43.94 kg/m               GENERAL: alert and no distress  EYES: Eyes grossly normal to inspection.  No discharge or erythema, or obvious scleral/conjunctival abnormalities.  RESP: No audible wheeze, cough, or visible cyanosis.    SKIN: Visible skin clear. No significant rash, abnormal pigmentation or lesions.  NEURO: Cranial nerves grossly intact.  Mentation and speech appropriate for age.  PSYCH: Appropriate affect, tone, and pace of words        Sincerely,    Mary Mane NP      15 minutes spent by me on the date of the encounter doing chart review, history and exam, documentation " and further activities per the note    Virtual Visit Details    Type of service:  Video Visit   Video Start Time:  1305  Video End Time: 1315    Originating Location (pt. Location): Home    Distant Location (provider location):  Off-site  Platform used for Video Visit: [employer]

## 2024-07-18 NOTE — ASSESSMENT & PLAN NOTE
Doing well on wegovy. Big transition with eating 3 meals a day (was eating 1 per day) since moving to group home. Feeling good about change. Some nausea with 1mg wegovy so would recommend staying here for now. Discussed increase if needed prior to follow up.     Continue wegovy 1mg   Great work eatnig 3 meals a day   Great work walking   Follow up 3 months with Vicenta Ward PA-C    Can consider dose increase if hunger/ cravings worse before follow up

## 2024-07-18 NOTE — LETTER
2024       RE: Regina Marshall  [id number] [facility] [id number]     Dear Colleague,    Thank you for referring your patient, Regina Marshall, to the [facility] [place] [facility]. Please see a copy of my visit note below.      Return Medical Weight Management Note     Regina Marshall  MRN:  [id number]  :  2000  TRISTAN:  2024    Dear Guerline RÍOS, Flex DNP,, APRN CNP,    I had the pleasure of seeing your patient Regina Marshall. She is a 24 year old female who I am continuing to see for treatment of obesity related to:        2024     3:48 PM   --   I have the following health issues associated with obesity None of the above   I have the following symptoms associated with obesity Knee Pain    Depression    Back Pain    Fatigue    Groin Rash    Hip Pain       Assessment & Plan  Problem List Items Addressed This Visit          Digestive    Class 3 drug-induced obesity with serious comorbidity and body mass index (BMI) of 45.0 to 49.9 in adult (H) - Primary     Doing well on wegovy. Big transition with eating 3 meals a day (was eating 1 per day) since moving to group home. Feeling good about change. Some nausea with 1mg wegovy so would recommend staying here for now. Discussed increase if needed prior to follow up.     Continue wegovy 1mg   Great work eatnig 3 meals a day   Great work walking   Follow up 3 months with Vicenta Ward PA-C    Can consider dose increase if hunger/ cravings worse before follow up                INTERVAL HISTORY:  [facility] 2024 BMI 46 -weight gain when starting Abilify as a teen. with joint pain, depression, fatigue, anxiety, ADHD  Anti-obesity medication history    Current:   Wegovy 1mg -started yesterday   -occasional nausea requiring zofran- less than once a week - usually the evening after taking injection in the AM   -no vomiting  -no heart burn   -no constipation/  diarrhea     Bupropion 150mg (mood)       Recent diet changes:   3 meals a day since moving into group home - smaller meals   No snacking between meals   Less late night snacking   Payton at meals times     Recent exercise/activity changes:   Walking every other day     Recent stressors:   Less stress recently - other than acutally moving     Recent sleep changes: no issues     Vitamins/Labs: 5/2024    CURRENT WEIGHT:   256 lbs 0 oz    Initial Weight (lbs): 266 lbs  Last Visits Weight: 117 kg (258 lb)  Cumulative weight loss (lbs): 10  Weight Loss Percentage: 3.76%        7/17/2024    12:32 PM   Changes and Difficulties   I have made the following changes to my diet since my last visit: More fruit and consistency eating meals   With regards to my diet, I am still struggling with: Eating late   I have made the following changes to my activity/exercise since my last visit: Walking more   With regards to my activity/exercise, I am still struggling with: Working out         MEDICATIONS:   Current Outpatient Medications   Medication Sig Dispense Refill    ARIPiprazole (ABILIFY) 15 MG tablet Take 15 mg by mouth daily      Ascorbic Acid (VITAMIN C ADULT GUMMIES PO)       buPROPion (WELLBUTRIN XL) 150 MG 24 hr tablet TAKE ONE TABLET BY MOUTH EVERY MORNING WITH 300MG TABLET TO EQUAL 450MG DOSE      cetirizine (ZYRTEC) 10 MG tablet Take 1 tablet (10 mg) by mouth daily 90 tablet 3    fexofenadine (ALLEGRA) 180 MG tablet Take 180 mg by mouth daily      hydrOXYzine (ATARAX) 50 MG tablet Take 1 tablet (50 mg) by mouth 2 times daily as needed      levonorgestrel (KYLEENA) 19.5 MG IUD 1 each by Intrauterine route once      mirtazapine (REMERON) 22.5 mg half-tab       omeprazole (PRILOSEC) 20 MG DR capsule Take 1 capsule (20 mg) by mouth 2 times daily 180 capsule 3    ondansetron (ZOFRAN) 8 MG tablet Take 1 tablet (8 mg) by mouth every 8 hours as needed for nausea 30 tablet 1    Semaglutide-Weight Management (WEGOVY) 1 MG/0.5ML pen  Inject 1 mg subcutaneously once a week 2 mL 3    sertraline (ZOLOFT) 100 MG tablet Take 100 mg by mouth daily Takes 2 tablet daily. 200mg.      vitamin D3 (CHOLECALCIFEROL) 50 mcg (2000 units) tablet Take 1 tablet (50 mcg) by mouth daily 90 tablet 2           7/17/2024    12:32 PM   Weight Loss Medication History Reviewed With Patient   Which weight loss medications are you currently taking on a regular basis? Vyvanse    Wegovy   Are you having any side effects from the weight loss medication that we have prescribed you? No       Admission on 05/02/2024, Discharged on 05/02/2024   Component Date Value Ref Range Status    Sodium 05/02/2024 140  135 - 145 mmol/L Final    Reference intervals for this test were updated on 09/26/2023 to more accurately reflect our healthy population. There may be differences in the flagging of prior results with similar values performed with this method. Interpretation of those prior results can be made in the context of the updated reference intervals.     Potassium 05/02/2024 5.3  3.4 - 5.3 mmol/L Final    Specimen slightly hemolyzed. The reported potassium value may be falsely elevated. Analysis of a non-hemolyzed specimen (i.e. re-draw) may result in a lower potassium value.    Carbon Dioxide (CO2) 05/02/2024 17 (L)  22 - 29 mmol/L Final    Anion Gap 05/02/2024 20 (H)  7 - 15 mmol/L Final    Urea Nitrogen 05/02/2024 14.6  6.0 - 20.0 mg/dL Final    Creatinine 05/02/2024 1.01 (H)  0.51 - 0.95 mg/dL Final    GFR Estimate 05/02/2024 80  >60 mL/min/1.73m2 Final    Calcium 05/02/2024 9.2  8.6 - 10.0 mg/dL Final    Chloride 05/02/2024 103  98 - 107 mmol/L Final    Glucose 05/02/2024 85  70 - 99 mg/dL Final    Alkaline Phosphatase 05/02/2024 107  40 - 150 U/L Final    Reference intervals for this test were updated on 11/14/2023 to more accurately reflect our healthy population. There may be differences in the flagging of prior results with similar values performed with this method.  Interpretation of those prior results can be made in the context of the updated reference intervals.    AST 05/02/2024    Final    Unsatisfactory specimen - hemolyzed   Reference intervals for this test were updated on 6/12/2023 to more accurately reflect our healthy population. There may be differences in the flagging of prior results with similar values performed with this method. Interpretation of those prior results can be made in the context of the updated reference intervals.    ALT 05/02/2024 28  0 - 50 U/L Final    Reference intervals for this test were updated on 6/12/2023 to more accurately reflect our healthy population. There may be differences in the flagging of prior results with similar values performed with this method. Interpretation of those prior results can be made in the context of the updated reference intervals.      Protein Total 05/02/2024 9.1 (H)  6.4 - 8.3 g/dL Final    Albumin 05/02/2024 4.4  3.5 - 5.2 g/dL Final    Bilirubin Total 05/02/2024 0.2  <=1.2 mg/dL Final    Hold Specimen 05/02/2024 JIC   Final    WBC Count 05/02/2024 10.3  4.0 - 11.0 10e3/uL Final    RBC Count 05/02/2024 4.99  3.80 - 5.20 10e6/uL Final    Hemoglobin 05/02/2024 14.2  11.7 - 15.7 g/dL Final    Hematocrit 05/02/2024 44.0  35.0 - 47.0 % Final    MCV 05/02/2024 88  78 - 100 fL Final    MCH 05/02/2024 28.5  26.5 - 33.0 pg Final    MCHC 05/02/2024 32.3  31.5 - 36.5 g/dL Final    RDW 05/02/2024 13.4  10.0 - 15.0 % Final    Platelet Count 05/02/2024 306  150 - 450 10e3/uL Final    % Neutrophils 05/02/2024 73  % Final    % Lymphocytes 05/02/2024 19  % Final    % Monocytes 05/02/2024 6  % Final    % Eosinophils 05/02/2024 1  % Final    % Basophils 05/02/2024 0  % Final    % Immature Granulocytes 05/02/2024 0  % Final    NRBCs per 100 WBC 05/02/2024 0  <1 /100 Final    Absolute Neutrophils 05/02/2024 7.5  1.6 - 8.3 10e3/uL Final    Absolute Lymphocytes 05/02/2024 2.0  0.8 - 5.3 10e3/uL Final    Absolute Monocytes  "05/02/2024 0.6  0.0 - 1.3 10e3/uL Final    Absolute Eosinophils 05/02/2024 0.1  0.0 - 0.7 10e3/uL Final    Absolute Basophils 05/02/2024 0.0  0.0 - 0.2 10e3/uL Final    Absolute Immature Granulocytes 05/02/2024 0.0  <=0.4 10e3/uL Final    Absolute NRBCs 05/02/2024 0.0  10e3/uL Final    Lipase 05/02/2024 52  13 - 60 U/L Final    Color Urine 05/02/2024 Straw  Colorless, Straw, Light Yellow, Yellow Final    Appearance Urine 05/02/2024 Clear  Clear Final    Glucose Urine 05/02/2024 Negative  Negative mg/dL Final    Bilirubin Urine 05/02/2024 Negative  Negative Final    Ketones Urine 05/02/2024 Negative  Negative mg/dL Final    Specific Gravity Urine 05/02/2024 1.012  1.003 - 1.035 Final    Blood Urine 05/02/2024 Negative  Negative Final    pH Urine 05/02/2024 5.0  5.0 - 7.0 Final    Protein Albumin Urine 05/02/2024 Negative  Negative mg/dL Final    Urobilinogen Urine 05/02/2024 Normal  Normal, 2.0 mg/dL Final    Nitrite Urine 05/02/2024 Negative  Negative Final    Leukocyte Esterase Urine 05/02/2024 Negative  Negative Final    Mucus Urine 05/02/2024 Present (A)  None Seen /LPF Final    RBC Urine 05/02/2024 0  <=2 /HPF Final    WBC Urine 05/02/2024 0  <=5 /HPF Final    Squamous Epithelials Urine 05/02/2024 2 (H)  <=1 /HPF Final    hCG Urine Qualitative 05/02/2024 Negative  Negative Final    This test is for screening purposes.  Results should be interpreted along with the clinical picture.  Confirmation testing is available if warranted by ordering [id number], HCG Quantitative Pregnancy.           5/20/2024     3:55 PM   ROHIT Score (Last Two)   ROHIT Raw Score 30   Activation Score 56   ROHIT Level 3         PHYSICAL EXAM:  Objective   Ht 1.626 m (5' 4\")   Wt 116.1 kg (256 lb)   BMI 43.94 kg/m               GENERAL: alert and no distress  EYES: Eyes grossly normal to inspection.  No discharge or erythema, or obvious scleral/conjunctival abnormalities.  RESP: No audible wheeze, cough, or visible cyanosis.    SKIN: Visible skin " clear. No significant rash, abnormal pigmentation or lesions.  NEURO: Cranial nerves grossly intact.  Mentation and speech appropriate for age.  PSYCH: Appropriate affect, tone, and pace of words        Sincerely,    Mary Mane NP      15 minutes spent by me on the date of the encounter doing chart review, history and exam, documentation and further activities per the note    Virtual Visit Details    Type of service:  Video Visit   Video Start Time:  1305  Video End Time: 1315    Originating Location (pt. Location): Home    Distant Location (provider location):  Off-site  Platform used for Video Visit: [employer]

## 2024-07-18 NOTE — NURSING NOTE
Current patient location: [address] [place] [id number]    Is the patient currently in the state of [place]? YES    Visit mode:VIDEO    If the visit is dropped, the patient can be reconnected by: VIDEO VISIT: Text to cell phone:   Telephone Information:   Mobile [phone]       Will anyone else be joining the visit? NO  (If patient encounters technical issues they should call [phone]    How would you like to obtain your AVS? MyChart    Are changes needed to the allergy or medication list? No    Are refills needed on medications prescribed by this physician? NO    Reason for visit: RECHLANDON EDWARD

## 2024-07-18 NOTE — PATIENT INSTRUCTIONS
Continue wegovy 1mg   Great work eatnig 3 meals a day   Great work walking   Follow up 3 months with Vicenta Ward PAGayC    Can consider dose increase if hunger/ cravings worse before follow up

## 2024-07-19 LAB — VIT D+METAB SERPL-MCNC: 63 NG/ML (ref 20–50)

## 2024-07-23 ENCOUNTER — TELEPHONE (OUTPATIENT)
Dept: ENDOCRINOLOGY | Facility: CLINIC | Age: 24
End: 2024-07-23
Payer: COMMERCIAL

## 2024-07-23 NOTE — TELEPHONE ENCOUNTER
Patient confirmed scheduled appointment:  Date: 1/10/25  Time: 3pm  Visit type: Return weight Mgmt  Provider: Vicenta Ward  Location: [facility]

## 2024-07-30 NOTE — PROGRESS NOTES
"Video-Visit Details    Type of service:  Video Visit    Video Start Time: 2:01 pm   Video End Time: 2:25 pm    Originating Location (pt. Location): Home    Distant Location (provider location):  Offsite (providers home) [facility] [place]     Platform used for Video Visit: [address]    Weight Management Nutrition Consultation    Regina Marshall is a 24 year old female presents today for weight management nutrition consultation.  Patient referred by Vicenta Ward PA-C on May 21, 2024.    Patient with Co-morbidities of obesity includin/20/2024     3:48 PM   --   I have the following health issues associated with obesity None of the above   I have the following symptoms associated with obesity Knee Pain    Depression    Back Pain    Fatigue    Groin Rash    Hip Pain       Anthropometrics:  Weight 24: 270 lbs with BMI 46.32    Estimated body mass index is 43.43 kg/m  as calculated from the following:    Height as of this encounter: 1.626 m (5' 4\").    Weight as of this encounter: 114.8 kg (253 lb).    Current weight: 253 lbs, pt report (down 5 lbs since last RD visit)    Medications for Weight Loss:  Wegovy prescribed 24 - increased to 1 mg last week. Did have some nausea initially but not anymore.    Also takes Vyvanse     NUTRITION HISTORY  Food allergies: NKFA  Food intolerances: None  Does not like certain fruits/vegetables - texture related per pt. Does not like mushrooms/avocado for example.   Dx with GERD recently - was having N/V. Improved with weight loss in December per chart review but worse recently with larger portion sizes/eating at night.   Vitamin/Mineral Supplements: Did not discuss today  Previous methods of diet modification for weight loss: cleanse in December - was helpful per pt. Advocare cleanse, took cleanse pills and followed certain recipes or meal replacements.   RD before: Has worked with a nutritionist before but does not recall details " "- was young per pt.     Pt goals: weight loss to be healthier/feel better and be more active   - Wants to decrease night time snacking.    - Hoping for future breast reduction per chart review     Weight gain associated with Abilify. Not able to go off of per pt - has been super helpful in other areas.     Recently quit vaping - increased hunger since then.    Per PA note   Eating 1 large meal a day, with 2 snacks during the day and 1 snack after dinner. Hunger is well controlled during the day, but then really hungry from dinner on. Larger portions especially at dinner. Eats very quickly. I hard to get full and stay full from dinner on. Craves pasta, \"junk food\", and dessert. Emotional eating, especially with depression symptoms. Boredom eating - \"if has nothing to do will eat\". Drinks water, juice (1 glass), milk (1 glass), coffee (2 large espresso shaker from [employer]).      Dinner meal choices -  whatever foster mom makes  such as spaghetti     Feels snacking is bad and wants to work on. Gets hungry late at night. Wants to better control this.     Barriers to nutrition: not wanting to eat and not hungry    BM: IBS - C/D at baseline, typically more diarrhea. Has seen GI/Urology and PCP.     PA: walks/bikes to work (3 miles round trip)    - Limited by fatigue but wants to increase     July 2, 2024:  Not eating as much- only eating one meal right now and has been smaller portion. Has a lifesaver here and there throughout the day. Not feeling as hungry in the morning, but then gets very hungry at night. Sometimes will have a protein shake.    Hydration: Coffee in AM but nothing else until 9:00, will drink 1 pop per day, drinks water 2 water bottles (24 oz- 48 total oz per day)    PA: Continues with walking/biking to work (3 miles)    July 31, 2024:  Has been eating more fruit and more consistent meals. Feels she needs to eat more protein.    B- cereal (frosted flakes, rice krispies) with milk  L- varies, salami " sandwiches (cheese and cross) with vegetables (tomatoes, peppers)   D- Varies, whatever she is cooking. Tomorrow making chicken caprese burgers (curtis with mozzarella and bun). Tonight having mac and cheese meatloaf, had a salad last night (lettuce/cucumbers/raspberry vinaigrette)  Snack: fruit, greek yogurt    Hydration: Drinking a lot of coffee, not having any water per day currently.    PA: Goes on a couple of walks per week.     Previous goals:  1. Activity goal: consider looking into [employer] or [employer]   2. Nutrition goal: Aim for 2 eating periods per day (aim for protein/fiber) - In progress  3. Aim for 3 water bottles per day (64 oz total)    Additional information:  Recently moved so doesn't have a job currently    Previously was living with foster family - 2 other foster sisters and 8 year old daughter per Vicenta Thorne's note        5/20/2024     3:48 PM   Diet Recall Review with Patient   How many glasses of juice do you drink in a typical day? 1   How many of glasses of milk do you drink in a typical day? 1   If you do drink milk, what type? 1%   How many 8oz glasses of sugar containing drinks such as Rajeev-Aid/sweet tea do you drink in a day? 0   How many cans/bottles of sugar pop/soda/tea/sports drinks do you drink in a day? 2   How many cans/bottles of diet pop/soda/tea or sports drink do you drink in a day? 0   How often do you have a drink of alcohol? Monthly or Less   If you do drink, how many drinks might you have in a day? 1 or 2           5/20/2024     3:48 PM   Eating Habits   Generally, my meals include foods like these bread, pasta, rice, potatoes, corn, crackers, sweet dessert, pop, or juice Almost Everyday   Generally, my meals include foods like these fried meats, brats, burgers, french fries, pizza, cheese, chips, or ice cream A Few Times a Week   Eat fast food (like [employer], [employer], Taco Bell) Once a Week   Eat at a buffet or sit-down restaurant Never   Eat most of my meals in  front of the TV or computer A Few Times a Week   Often skip meals, eat at random times, have no regular eating times Everyday   Rarely sit down for a meal but snack or graze throughout Almost Everyday   Eat extra snacks between meals Almost Everyday   Eat most of my food at the end of the day Everyday   Eat in the middle of the night or wake up at night to eat Everyday   Eat extra snacks to prevent or correct low blood sugar Never   Eat to prevent acid reflux or stomach pain Never   Worry about not having enough food to eat Never   I eat when I am depressed Everyday   I eat when I am stressed Almost Everyday   I eat when I am bored Almost Everyday   I eat when I am anxious Everyday   I eat when I am happy or as a reward Everyday   I feel hungry all the time even if I just have eaten Everyday   Feeling full is important to me Less Than Weekly   I finish all the food on my plate even if I am already full Almost Everyday   I can't resist eating delicious food or walk past the good food/smell Once a Week   I eat/snack without noticing that I am eating Almost Everyday   I eat when I am preparing the meal Almost Everyday   I eat more than usual when I see others eating Almost Everyday   I have trouble not eating sweets, ice cream, cookies, or chips if they are around the house Almost Everyday   I think about food all day Almost Everyday   What foods, if any, do you crave? Chips/Crackers   Please list any other foods you crave? Sweets           5/20/2024     3:48 PM   Amount of Food   I feel out of control when eating Almost Everyday   I eat a large amount of food, like a loaf of bread, a box of cookies, a pint/quart of ice cream, all at once Almost Everyday   I eat a large amount of food even when I am not hungry Weekly   I eat rapidly Everyday   I eat alone because I feel embarrassed and do not want others to see how much I have eaten Monthly   I eat until I am uncomfortably full Almost Everyday   I feel bad, disgusted,  or guilty after I overeat Everyday         5/20/2024     3:48 PM   Activity/Exercise History   How much of a typical 12 hour day do you spend sitting? Less Than Half the Day   How much of a typical 12 hour day do you spend lying down? Less Than Half the Day   How much of a typical day do you spend walking/standing? Half the Day   How many hours (not including work) do you spend on the TV/Video Games/Computer/Tablet/Phone? 2-3 Hours   How many times a week are you active for the purpose of exercise? 2-3 Times a Week   What keeps you from being more active? Pain    Unsure What To Do    Other   How many total minutes do you spend doing some activity for the purpose of exercising when you exercise? 15-30 Minutes       Nutrition Prescription  Recommended energy/nutrient modification.    Nutrition Diagnosis  Obesity r/t long history of positive energy balance aeb BMI >30.    Nutrition Intervention  Reviewed current dietary habits and pts history   Reviewed and modified previous goals  Answered pt questions  Coordination of care   Nutrition education   AVS and handouts via [employer]    Expected Engagement: good    Nutrition Goals  1. Aim to start biking again  2. Aim to walk on the treadmill twice/week  3. Aim for 3 eating periods per day (aim for protein/fiber)  4. Aim for 2 water bottles per day (48 oz total), look into flavored water options    Sugar free flavored water options:  - Hint water  - Heath drops  - Crystal light  - Nestle Splash water    Foods to bring on the go:  - Protein shake  - Protein bar  - Greek yogurt (focus on high protein/low sugar)  - [place] cheese/nut packs  - Canned tuna or chicken with plain nonfat greek yogurt with whole wheat crackers or toast  - Fruit (apples, grapes, strawberries, mangos, or see fruit ideas list below for other options)  - Vegetables (cucumbers, carrots, bell peppers, snap peas) with hummus or low fat ranch    Items to consider at meals time:   Activa yogurt  String  cheese  Toast with peanut butter  Wrap with deli meat  Wrap with eggs   *Could add fruit/vegetable to side or vegetables into wrap     Fruit ideas:  Strawberries  Neeraj  Apples  Bananas   Watermelon  Cantaloupe  Cuties    Tips for reflux:  - Eat slowly (20-30 minutes per meal), chewing foods well (25 chews per bite/applesauce consistency)  - Wait 3 hours after eating before lying down.  - Eat in a calm, relaxed place. Sit down while you eat  - Allow time between eating and sleeping. Eating too close to bed can cause reflux  - Reduce stress. Stress can cause reflux.    Possible reflux triggers:  - alcohol, particularly red wine.  - chocolate  - citrus (lemon, orange juice, etc.)  - black pepper  - garlic  - raw onions  - spicy foods  - coffee and caffeinated drinks, including tea and soda.  - peppermint.  - tomatoes  - carbonation        Follow-Up:  Friday, August 30th at 2:30 pm    Time spent with patient: 24 minutes.  Pricilla Bass, GLADYS, RD, LD  Clinic #: [phone]

## 2024-07-31 ENCOUNTER — VIRTUAL VISIT (OUTPATIENT)
Dept: ENDOCRINOLOGY | Facility: CLINIC | Age: 24
End: 2024-07-31
Payer: COMMERCIAL

## 2024-07-31 VITALS — BODY MASS INDEX: 43.19 KG/M2 | HEIGHT: 64 IN | WEIGHT: 253 LBS

## 2024-07-31 DIAGNOSIS — E66.9 OBESITY: ICD-10-CM

## 2024-07-31 DIAGNOSIS — Z71.3 NUTRITIONAL COUNSELING: Primary | ICD-10-CM

## 2024-07-31 PROCEDURE — 99207 PR NO CHARGE LOS: CPT | Mod: 95

## 2024-07-31 PROCEDURE — 97803 MED NUTRITION INDIV SUBSEQ: CPT | Mod: 95

## 2024-07-31 ASSESSMENT — PAIN SCALES - GENERAL: PAINLEVEL: NO PAIN (0)

## 2024-07-31 NOTE — PATIENT INSTRUCTIONS
Ernesto MCKNIGHT,    Please follow-up with dietitian on Friday, August 30th at 2:30 pm.    Thank you,    Pricilla Bass, MPS, RD, LD  If you need to schedule or reschedule, please call [phone].    Nutrition Goals:  1. Aim to start biking again  2. Aim to walk on the treadmill twice/week  3. Aim for 3 eating periods per day (aim for protein/fiber)  4. Aim for 2 water bottles per day (48 oz total), look into flavored water options    Sugar free flavored water options:  - Hint water  - [place] drops  - Crystal light  - Nestle Splash water    Foods to bring on the go:  - Protein shake  - Protein bar  - Greek yogurt (focus on high protein/low sugar)  - [place] cheese/nut packs  - Canned tuna or chicken with plain nonfat greek yogurt with whole wheat crackers or toast  - Fruit (apples, grapes, strawberries, mangos, or see fruit ideas list below for other options)  - Vegetables (cucumbers, carrots, bell peppers, snap peas) with hummus or low fat ranch    Items to consider at meals time:   Activa yogurt  String cheese  Toast with peanut butter  Wrap with deli meat  Wrap with eggs   *Could add fruit/vegetable to side or vegetables into wrap     Fruit ideas:  Strawberries  Gisela  Apples  Bananas   Watermelon  Cantaloupe  Cuties    Tips for reflux:  - Eat slowly (20-30 minutes per meal), chewing foods well (25 chews per bite/applesauce consistency)  - Wait 3 hours after eating before lying down.  - Eat in a calm, relaxed place. Sit down while you eat  - Allow time between eating and sleeping. Eating too close to bed can cause reflux  - Reduce stress. Stress can cause reflux.    Possible reflux triggers:  - alcohol, particularly red wine.  - chocolate  - citrus (lemon, orange juice, etc.)  - black pepper  - garlic  - raw onions  - spicy foods  - coffee and caffeinated drinks, including tea and soda.  - peppermint.  - tomatoes  - carbonation

## 2024-07-31 NOTE — NURSING NOTE
Current patient location: [address] [place] [id number]    Is the patient currently in the state of [place]? YES    Visit mode:VIDEO    If the visit is dropped, the patient can be reconnected by: VIDEO VISIT: Text to cell phone:   Telephone Information:   Mobile [phone]    and VIDEO VISIT: Send to e-mail at: [email or web address]    Will anyone else be joining the visit? NO  (If patient encounters technical issues they should call [phone]    How would you like to obtain your AVS? MyChart    Are changes needed to the allergy or medication list? No    Are refills needed on medications prescribed by this physician? NO    Rooming Documentation:  Questionnaire(s) not pre-assigned Not applicable      Reason for visit: RECHLANDON EDWARD

## 2024-07-31 NOTE — LETTER
"2024       RE: Regina Marshall  [id number] [facility] [address]     Dear Colleague,    Thank you for referring your patient, Regina Marshall, to the [facility] [place] at [facility]. Please see a copy of my visit note below.    Video-Visit Details    Type of service:  Video Visit    Video Start Time: 2:01 pm   Video End Time: 2:25 pm    Originating Location (pt. Location): Home    Distant Location (provider location):  Offsite (providers home) [facility] [place]     Platform used for Video Visit: Laiyaoyao    Weight Management Nutrition Consultation    Regina Marshall is a 24 year old female presents today for weight management nutrition consultation.  Patient referred by Vicenta Ward PA-C on May 21, 2024.    Patient with Co-morbidities of obesity includin/20/2024     3:48 PM   --   I have the following health issues associated with obesity None of the above   I have the following symptoms associated with obesity Knee Pain    Depression    Back Pain    Fatigue    Groin Rash    Hip Pain       Anthropometrics:  Weight 24: 270 lbs with BMI 46.32    Estimated body mass index is 43.43 kg/m  as calculated from the following:    Height as of this encounter: 1.626 m (5' 4\").    Weight as of this encounter: 114.8 kg (253 lb).    Current weight: 253 lbs, pt report (down 5 lbs since last RD visit)    Medications for Weight Loss:  Wegovy prescribed 24 - increased to 1 mg last week. Did have some nausea initially but not anymore.    Also takes Vyvanse     NUTRITION HISTORY  Food allergies: NKFA  Food intolerances: None  Does not like certain fruits/vegetables - texture related per pt. Does not like mushrooms/avocado for example.   Dx with GERD recently - was having N/V. Improved with weight loss in December per chart review but worse recently with larger portion " "sizes/eating at night.   Vitamin/Mineral Supplements: Did not discuss today  Previous methods of diet modification for weight loss: cleanse in December - was helpful per pt. Advocare cleanse, took cleanse pills and followed certain recipes or meal replacements.   RD before: Has worked with a nutritionist before but does not recall details - was young per pt.     Pt goals: weight loss to be healthier/feel better and be more active   - Wants to decrease night time snacking.    - Hoping for future breast reduction per chart review     Weight gain associated with Abilify. Not able to go off of per pt - has been super helpful in other areas.     Recently quit vaping - increased hunger since then.    Per PA note   Eating 1 large meal a day, with 2 snacks during the day and 1 snack after dinner. Hunger is well controlled during the day, but then really hungry from dinner on. Larger portions especially at dinner. Eats very quickly. I hard to get full and stay full from dinner on. Craves pasta, \"junk food\", and dessert. Emotional eating, especially with depression symptoms. Boredom eating - \"if has nothing to do will eat\". Drinks water, juice (1 glass), milk (1 glass), coffee (2 large espresso shaker from [employer]).      Dinner meal choices -  whatever foster mom makes  such as spaghetti     Feels snacking is bad and wants to work on. Gets hungry late at night. Wants to better control this.     Barriers to nutrition: not wanting to eat and not hungry    BM: IBS - C/D at baseline, typically more diarrhea. Has seen GI/Urology and PCP.     PA: walks/bikes to work (3 miles round trip)    - Limited by fatigue but wants to increase     July 2, 2024:  Not eating as much- only eating one meal right now and has been smaller portion. Has a lifesaver here and there throughout the day. Not feeling as hungry in the morning, but then gets very hungry at night. Sometimes will have a protein shake.    Hydration: Coffee in AM but nothing else " until 9:00, will drink 1 pop per day, drinks water 2 water bottles (24 oz- 48 total oz per day)    PA: Continues with walking/biking to work (3 miles)    July 31, 2024:  Has been eating more fruit and more consistent meals. Feels she needs to eat more protein.    B- cereal (frosted flakes, rice krispies) with milk  L- varies, salami sandwiches (cheese and cross) with vegetables (tomatoes, peppers)   D- Varies, whatever she is cooking. Tomorrow making chicken caprese burgers (curtis with mozzarella and bun). Tonight having mac and cheese meatloaf, had a salad last night (lettuce/cucumbers/raspberry vinaigrette)  Snack: fruit, greek yogurt    Hydration: Drinking a lot of coffee, not having any water per day currently.    PA: Goes on a couple of walks per week.     Previous goals:  1. Activity goal: consider looking into [employer] or volleyball   2. Nutrition goal: Aim for 2 eating periods per day (aim for protein/fiber) - In progress  3. Aim for 3 water bottles per day (64 oz total)    Additional information:  Recently moved so doesn't have a job currently    Previously was living with foster family - 2 other foster sisters and 8 year old daughter per Vicenta Thorne's note        5/20/2024     3:48 PM   Diet Recall Review with Patient   How many glasses of juice do you drink in a typical day? 1   How many of glasses of milk do you drink in a typical day? 1   If you do drink milk, what type? 1%   How many 8oz glasses of sugar containing drinks such as Rajeev-Aid/sweet tea do you drink in a day? 0   How many cans/bottles of sugar pop/soda/tea/sports drinks do you drink in a day? 2   How many cans/bottles of diet pop/soda/tea or sports drink do you drink in a day? 0   How often do you have a drink of alcohol? Monthly or Less   If you do drink, how many drinks might you have in a day? 1 or 2           5/20/2024     3:48 PM   Eating Habits   Generally, my meals include foods like these bread, pasta, rice, potatoes, corn, crackers,  sweet dessert, pop, or juice Almost Everyday   Generally, my meals include foods like these fried meats, brats, burgers, french fries, pizza, cheese, chips, or ice cream A Few Times a Week   Eat fast food (like McDonalds, [employer] Gen, Taco Bell) Once a Week   Eat at a buffet or sit-down restaurant Never   Eat most of my meals in front of the TV or computer A Few Times a Week   Often skip meals, eat at random times, have no regular eating times Everyday   Rarely sit down for a meal but snack or graze throughout Almost Everyday   Eat extra snacks between meals Almost Everyday   Eat most of my food at the end of the day Everyday   Eat in the middle of the night or wake up at night to eat Everyday   Eat extra snacks to prevent or correct low blood sugar Never   Eat to prevent acid reflux or stomach pain Never   Worry about not having enough food to eat Never   I eat when I am depressed Everyday   I eat when I am stressed Almost Everyday   I eat when I am bored Almost Everyday   I eat when I am anxious Everyday   I eat when I am happy or as a reward Everyday   I feel hungry all the time even if I just have eaten Everyday   Feeling full is important to me Less Than Weekly   I finish all the food on my plate even if I am already full Almost Everyday   I can't resist eating delicious food or walk past the good food/smell Once a Week   I eat/snack without noticing that I am eating Almost Everyday   I eat when I am preparing the meal Almost Everyday   I eat more than usual when I see others eating Almost Everyday   I have trouble not eating sweets, ice cream, cookies, or chips if they are around the house Almost Everyday   I think about food all day Almost Everyday   What foods, if any, do you crave? Chips/Crackers   Please list any other foods you crave? Sweets           5/20/2024     3:48 PM   Amount of Food   I feel out of control when eating Almost Everyday   I eat a large amount of food, like a loaf of bread, a box of  cookies, a pint/quart of ice cream, all at once Almost Everyday   I eat a large amount of food even when I am not hungry Weekly   I eat rapidly Everyday   I eat alone because I feel embarrassed and do not want others to see how much I have eaten Monthly   I eat until I am uncomfortably full Almost Everyday   I feel bad, disgusted, or guilty after I overeat Everyday         5/20/2024     3:48 PM   Activity/Exercise History   How much of a typical 12 hour day do you spend sitting? Less Than Half the Day   How much of a typical 12 hour day do you spend lying down? Less Than Half the Day   How much of a typical day do you spend walking/standing? Half the Day   How many hours (not including work) do you spend on the TV/Video Games/Computer/Tablet/Phone? 2-3 Hours   How many times a week are you active for the purpose of exercise? 2-3 Times a Week   What keeps you from being more active? Pain    Unsure What To Do    Other   How many total minutes do you spend doing some activity for the purpose of exercising when you exercise? 15-30 Minutes       Nutrition Prescription  Recommended energy/nutrient modification.    Nutrition Diagnosis  Obesity r/t long history of positive energy balance aeb BMI >30.    Nutrition Intervention  Reviewed current dietary habits and pts history   Reviewed and modified previous goals  Answered pt questions  Coordination of care   Nutrition education   AVS and handouts via [employer]    Expected Engagement: good    Nutrition Goals  1. Aim to start biking again  2. Aim to walk on the treadmill twice/week  3. Aim for 3 eating periods per day (aim for protein/fiber)  4. Aim for 2 water bottles per day (48 oz total), look into flavored water options    Sugar free flavored water options:  - Hint water  - Heath drops  - Crystal light  - Nestle Splash water    Foods to bring on the go:  - Protein shake  - Protein bar  - Greek yogurt (focus on high protein/low sugar)  - [place] cheese/nut packs  - Canned  tuna or chicken with plain nonfat greek yogurt with whole wheat crackers or toast  - Fruit (apples, grapes, strawberries, mangos, or see fruit ideas list below for other options)  - Vegetables (cucumbers, carrots, bell peppers, snap peas) with hummus or low fat ranch    Items to consider at meals time:   Activa yogurt  String cheese  Toast with peanut butter  Wrap with deli meat  Wrap with eggs   *Could add fruit/vegetable to side or vegetables into wrap     Fruit ideas:  Strawberries  [place]  Apples  Bananas   Watermelon  Cantaloupe  Cuties    Tips for reflux:  - Eat slowly (20-30 minutes per meal), chewing foods well (25 chews per bite/applesauce consistency)  - Wait 3 hours after eating before lying down.  - Eat in a calm, relaxed place. Sit down while you eat  - Allow time between eating and sleeping. Eating too close to bed can cause reflux  - Reduce stress. Stress can cause reflux.    Possible reflux triggers:  - alcohol, particularly red wine.  - chocolate  - citrus (lemon, orange juice, etc.)  - black pepper  - garlic  - raw onions  - spicy foods  - coffee and caffeinated drinks, including tea and soda.  - peppermint.  - tomatoes  - carbonation        Follow-Up:  Friday, August 30th at 2:30 pm    Time spent with patient: 24 minutes.  GLADYS Quintanilla, SHELLEY, LD  Clinic #: [phone]          Again, thank you for allowing me to participate in the care of your patient.      Sincerely,    Pricilla Bass RD

## 2024-08-09 ENCOUNTER — OFFICE VISIT (OUTPATIENT)
Dept: FAMILY MEDICINE | Facility: CLINIC | Age: 24
End: 2024-08-09
Payer: COMMERCIAL

## 2024-08-09 VITALS
HEART RATE: 87 BPM | DIASTOLIC BLOOD PRESSURE: 62 MMHG | WEIGHT: 246.6 LBS | OXYGEN SATURATION: 99 % | RESPIRATION RATE: 16 BRPM | BODY MASS INDEX: 43.7 KG/M2 | HEIGHT: 63 IN | TEMPERATURE: 97.9 F | SYSTOLIC BLOOD PRESSURE: 102 MMHG

## 2024-08-09 DIAGNOSIS — Z00.00 ROUTINE GENERAL MEDICAL EXAMINATION AT A HEALTH CARE FACILITY: Primary | ICD-10-CM

## 2024-08-09 DIAGNOSIS — N94.6 MENSTRUAL CRAMPS: ICD-10-CM

## 2024-08-09 PROCEDURE — 99395 PREV VISIT EST AGE 18-39: CPT | Performed by: NURSE PRACTITIONER

## 2024-08-09 PROCEDURE — 99213 OFFICE O/P EST LOW 20 MIN: CPT | Mod: 25 | Performed by: NURSE PRACTITIONER

## 2024-08-09 RX ORDER — BUPROPION HYDROCHLORIDE 300 MG/1
300 TABLET ORAL EVERY MORNING
COMMUNITY
Start: 2024-08-02

## 2024-08-09 RX ORDER — MIRTAZAPINE 15 MG/1
15 TABLET, FILM COATED ORAL AT BEDTIME
COMMUNITY
Start: 2024-08-02 | End: 2024-08-09 | Stop reason: DRUGHIGH

## 2024-08-09 RX ORDER — LISDEXAMFETAMINE DIMESYLATE 40 MG/1
40 CAPSULE ORAL EVERY MORNING
COMMUNITY

## 2024-08-09 RX ORDER — IBUPROFEN 200 MG
800 TABLET ORAL EVERY 8 HOURS PRN
COMMUNITY
Start: 2024-08-09

## 2024-08-09 RX ORDER — CHOLECALCIFEROL (VITAMIN D3) 25 MCG
1000 CAPSULE ORAL DAILY
COMMUNITY
Start: 2024-07-15 | End: 2024-08-09

## 2024-08-09 SDOH — HEALTH STABILITY: PHYSICAL HEALTH: ON AVERAGE, HOW MANY DAYS PER WEEK DO YOU ENGAGE IN MODERATE TO STRENUOUS EXERCISE (LIKE A BRISK WALK)?: 2 DAYS

## 2024-08-09 ASSESSMENT — SOCIAL DETERMINANTS OF HEALTH (SDOH): HOW OFTEN DO YOU GET TOGETHER WITH FRIENDS OR RELATIVES?: PATIENT DECLINED

## 2024-08-09 ASSESSMENT — PAIN SCALES - GENERAL: PAINLEVEL: SEVERE PAIN (6)
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
## 2024-08-09 NOTE — PATIENT INSTRUCTIONS
Increase as needed dose of Ibuprofen to 800 mg every 8 hours as needed for menstrual cramps     Should be taking Vitamin D as per med list     Follow-up in 1 year       Patient Education   Preventive Care Advice   This is general advice given by our system to help you stay healthy. However, your care team may have specific advice just for you. Please talk to your care team about your preventive care needs.  Nutrition  Eat 5 or more servings of fruits and vegetables each day.  Try wheat bread, brown rice and whole grain pasta (instead of white bread, rice, and pasta).  Get enough calcium and vitamin D. Check the label on foods and aim for 100% of the RDA (recommended daily allowance).  Lifestyle  Exercise at least 150 minutes each week  (30 minutes a day, 5 days a week).  Do muscle strengthening activities 2 days a week. These help control your weight and prevent disease.  No smoking.  Wear sunscreen to prevent skin cancer.  Have a dental exam and cleaning every 6 months.  Yearly exams  See your health care team every year to talk about:  Any changes in your health.  Any medicines your care team has prescribed.  Preventive care, family planning, and ways to prevent chronic diseases.  Shots (vaccines)   HPV shots (up to age 26), if you've never had them before.  Hepatitis B shots (up to age 59), if you've never had them before.  COVID-19 shot: Get this shot when it's due.  Flu shot: Get a flu shot every year.  Tetanus shot: Get a tetanus shot every 10 years.  Pneumococcal, hepatitis A, and RSV shots: Ask your care team if you need these based on your risk.  Shingles shot (for age 50 and up)  General health tests  Diabetes screening:  Starting at age 35, Get screened for diabetes at least every 3 years.  If you are younger than age 35, ask your care team if you should be screened for diabetes.  Cholesterol test: At age 39, start having a cholesterol test every 5 years, or more often if advised.  Bone density scan  (DEXA): At age 50, ask your care team if you should have this scan for osteoporosis (brittle bones).  Hepatitis C: Get tested at least once in your life.  STIs (sexually transmitted infections)  Before age 24: Ask your care team if you should be screened for STIs.  After age 24: Get screened for STIs if you're at risk. You are at risk for STIs (including HIV) if:  You are sexually active with more than one person.  You don't use condoms every time.  You or a partner was diagnosed with a sexually transmitted infection.  If you are at risk for HIV, ask about PrEP medicine to prevent HIV.  Get tested for HIV at least once in your life, whether you are at risk for HIV or not.  Cancer screening tests  Cervical cancer screening: If you have a cervix, begin getting regular cervical cancer screening tests starting at age 21.  Breast cancer scan (mammogram): If you've ever had breasts, begin having regular mammograms starting at age 40. This is a scan to check for breast cancer.  Colon cancer screening: It is important to start screening for colon cancer at age 45.  Have a colonoscopy test every 10 years (or more often if you're at risk) Or, ask your provider about stool tests like a FIT test every year or Cologuard test every 3 years.  To learn more about your testing options, visit:   .  For help making a decision, visit:   https://bit.ly/le16086.  Prostate cancer screening test: If you have a prostate, ask your care team if a prostate cancer screening test (PSA) at age 55 is right for you.  Lung cancer screening: If you are a current or former smoker ages 50 to 80, ask your care team if ongoing lung cancer screenings are right for you.  For informational purposes only. Not to replace the advice of your health care provider. Copyright   2023 [employer]. All rights reserved. Clinically reviewed by the [facility] Transitions Program. FID3 [id number] - REV 01/24.              3

## 2024-08-29 NOTE — PROGRESS NOTES
"Video-Visit Details    Type of service:  Video Visit    Video Start Time: 2:26 pm   Video End Time: 2:44 pm    Originating Location (pt. Location): Home    Distant Location (provider location):  Offsite (providers home) [facility] [place]     Platform used for Video Visit: [employer]    Weight Management Nutrition Consultation    Regina Marshall is a 24 year old female presents today for weight management nutrition consultation.  Patient referred by Vicenta Ward PA-C on May 21, 2024.    Patient with Co-morbidities of obesity includin/20/2024     3:48 PM   --   I have the following health issues associated with obesity None of the above   I have the following symptoms associated with obesity Knee Pain    Depression    Back Pain    Fatigue    Groin Rash    Hip Pain       Anthropometrics:  Weight 24: 270 lbs with BMI 46.32    Estimated body mass index is 43.85 kg/m  as calculated from the following:    Height as of this encounter: 1.588 m (5' 2.52\").    Weight as of this encounter: 110.6 kg (243 lb 12.8 oz).    Current weight: 243 lbs, pt report (down 10 lbs since last RD visit)    Medications for Weight Loss:  Wegovy prescribed 24 - still at 1 mg, no side effects.    Also takes Vyvanse     NUTRITION HISTORY  Food allergies: NKFA  Food intolerances: None  Does not like certain fruits/vegetables - texture related per pt. Does not like mushrooms/avocado for example.   Dx with GERD recently - was having N/V. Improved with weight loss in December per chart review but worse recently with larger portion sizes/eating at night.   Vitamin/Mineral Supplements: Did not discuss today  Previous methods of diet modification for weight loss: cleanse in December - was helpful per pt. Advocare cleanse, took cleanse pills and followed certain recipes or meal replacements.   RD before: Has worked with a nutritionist before but does not recall details - was young per pt.     Pt " "goals: weight loss to be healthier/feel better and be more active   - Wants to decrease night time snacking.    - Hoping for future breast reduction per chart review     Weight gain associated with Abilify. Not able to go off of per pt - has been super helpful in other areas.     Recently quit vaping - increased hunger since then.    Per PA note   Eating 1 large meal a day, with 2 snacks during the day and 1 snack after dinner. Hunger is well controlled during the day, but then really hungry from dinner on. Larger portions especially at dinner. Eats very quickly. I hard to get full and stay full from dinner on. Craves pasta, \"junk food\", and dessert. Emotional eating, especially with depression symptoms. Boredom eating - \"if has nothing to do will eat\". Drinks water, juice (1 glass), milk (1 glass), coffee (2 large espresso shaker from [employer]).      Dinner meal choices -  whatever foster mom makes  such as spaamberetti     Feels snacking is bad and wants to work on. Gets hungry late at night. Wants to better control this.     Barriers to nutrition: not wanting to eat and not hungry    BM: IBS - C/D at baseline, typically more diarrhea. Has seen GI/Urology and PCP.     PA: walks/bikes to work (3 miles round trip)    - Limited by fatigue but wants to increase     July 2, 2024:  Not eating as much- only eating one meal right now and has been smaller portion. Has a lifesaver here and there throughout the day. Not feeling as hungry in the morning, but then gets very hungry at night. Sometimes will have a protein shake.    Hydration: Coffee in AM but nothing else until 9:00, will drink 1 pop per day, drinks water 2 water bottles (24 oz- 48 total oz per day)    PA: Continues with walking/biking to work (3 miles)    July 31, 2024:  Has been eating more fruit and more consistent meals. Feels she needs to eat more protein.    B- cereal (frosted flakes, rice krispies) with milk  L- varies, salami sandwiches (cheese and cross) " with vegetables (tomatoes, peppers)   D- Varies, whatever she is cooking. Tomorrow making chicken caprese burgers (curtis with mozzarella and bun). Tonight having mac and cheese meatloaf, had a salad last night (lettuce/cucumbers/raspberry vinaigrette)  Snack: fruit, greek yogurt    Hydration: Drinking a lot of coffee, not having any water per day currently.    PA: Goes on a couple of walks per week.     August 2024:  Pt reports she has been eating less carbohydrates and focusing more on protein and fiber foods. Not eating as much pastas or breads. Has been consistently having three meals per day. For protein, is eating meats (beef and salami, ground turkey) and nuts. For fiber, is eating more fruits and vegetables like apples and carrots for example.    B- bowl of rice krispies with almond milk  L- meat with fruits and vegetables (I.e. carrots), peanut butter and jelly  D- Similar to lunch    Hydration: Drinking more water than previously, 1 water bottle (35-40 oz), using sugar free raspberry lemonade mix to help with flavoring water    PA: Got a bike and has been using that more often. Someone in her household recently had covid so she wasn't able to get out as much as she would have liked. Using the treadmill once/week and then would bike or walk 1-2 times/day. Recently got a new job and plans to bike to and from work.    Previous goals:  1. Aim to start biking again - Met  2. Aim to walk on the treadmill twice/week - In progress  3. Aim for 3 eating periods per day (aim for protein/fiber) - Met  4. Aim for 2 water bottles per day (48 oz total), look into flavored water options - In progress    Additional information:  New job-  (M-F)    Previously was living with foster family - 2 other foster sisters and 8 year old daughter per Vicenta Thorne's note        5/20/2024     3:48 PM   Diet Recall Review with Patient   How many glasses of juice do you drink in a typical day? 1   How many of glasses of milk do  you drink in a typical day? 1   If you do drink milk, what type? 1%   How many 8oz glasses of sugar containing drinks such as Rajeev-Aid/sweet tea do you drink in a day? 0   How many cans/bottles of sugar pop/soda/tea/sports drinks do you drink in a day? 2   How many cans/bottles of diet pop/soda/tea or sports drink do you drink in a day? 0   How often do you have a drink of alcohol? Monthly or Less   If you do drink, how many drinks might you have in a day? 1 or 2           5/20/2024     3:48 PM   Eating Habits   Generally, my meals include foods like these bread, pasta, rice, potatoes, corn, crackers, sweet dessert, pop, or juice Almost Everyday   Generally, my meals include foods like these fried meats, brats, burgers, french fries, pizza, cheese, chips, or ice cream A Few Times a Week   Eat fast food (like [employer], [employer], [employer]) Once a Week   Eat at a buffet or sit-down restaurant Never   Eat most of my meals in front of the TV or computer A Few Times a Week   Often skip meals, eat at random times, have no regular eating times Everyday   Rarely sit down for a meal but snack or graze throughout Almost Everyday   Eat extra snacks between meals Almost Everyday   Eat most of my food at the end of the day Everyday   Eat in the middle of the night or wake up at night to eat Everyday   Eat extra snacks to prevent or correct low blood sugar Never   Eat to prevent acid reflux or stomach pain Never   Worry about not having enough food to eat Never   I eat when I am depressed Everyday   I eat when I am stressed Almost Everyday   I eat when I am bored Almost Everyday   I eat when I am anxious Everyday   I eat when I am happy or as a reward Everyday   I feel hungry all the time even if I just have eaten Everyday   Feeling full is important to me Less Than Weekly   I finish all the food on my plate even if I am already full Almost Everyday   I can't resist eating delicious food or walk past the good food/smell Once  a Week   I eat/snack without noticing that I am eating Almost Everyday   I eat when I am preparing the meal Almost Everyday   I eat more than usual when I see others eating Almost Everyday   I have trouble not eating sweets, ice cream, cookies, or chips if they are around the house Almost Everyday   I think about food all day Almost Everyday   What foods, if any, do you crave? Chips/Crackers   Please list any other foods you crave? Sweets           5/20/2024     3:48 PM   Amount of Food   I feel out of control when eating Almost Everyday   I eat a large amount of food, like a loaf of bread, a box of cookies, a pint/quart of ice cream, all at once Almost Everyday   I eat a large amount of food even when I am not hungry Weekly   I eat rapidly Everyday   I eat alone because I feel embarrassed and do not want others to see how much I have eaten Monthly   I eat until I am uncomfortably full Almost Everyday   I feel bad, disgusted, or guilty after I overeat Everyday         5/20/2024     3:48 PM   Activity/Exercise History   How much of a typical 12 hour day do you spend sitting? Less Than Half the Day   How much of a typical 12 hour day do you spend lying down? Less Than Half the Day   How much of a typical day do you spend walking/standing? Half the Day   How many hours (not including work) do you spend on the TV/Video Games/Computer/Tablet/Phone? 2-3 Hours   How many times a week are you active for the purpose of exercise? 2-3 Times a Week   What keeps you from being more active? Pain    Unsure What To Do    Other   How many total minutes do you spend doing some activity for the purpose of exercising when you exercise? 15-30 Minutes       Nutrition Prescription  Recommended energy/nutrient modification.    Nutrition Diagnosis  Obesity r/t long history of positive energy balance aeb BMI >30.    Nutrition Intervention  Reviewed current dietary habits and pts history   Reviewed and modified previous goals  Answered pt  questions  Coordination of care   Nutrition education   AVS and handouts via [employer]    Expected Engagement: good    Nutrition Goals  1. Aim to walk 2-3 times/week outdoors  2. Aim for 3 eating periods per day (aim for protein/fiber)  3. Aim for 2 water bottles per day (48 oz total), consider going to the grocery to get sugar free flavored water options    Sugar free flavored water options:  - Hint water  - [place] drops  - Crystal light  - Nestle Splash water  - Sparkling icee  - SF lemonade from the "[employer] store    Foods to bring on the go:  - Protein shake  - Protein bar  - Greek yogurt (focus on high protein/low sugar)  - [place] cheese/nut packs  - Canned tuna or chicken with plain nonfat greek yogurt with whole wheat crackers or toast  - Fruit (apples, grapes, strawberries, mangos, or see fruit ideas list below for other options)  - Vegetables (cucumbers, carrots, bell peppers, snap peas) with hummus or low fat ranch    Items to consider at meals time:   Activa yogurt  String cheese  Toast with peanut butter  Wrap with deli meat  Wrap with eggs   *Could add fruit/vegetable to side or vegetables into wrap     Fruit ideas:  Strawberries  Azle  Apples  Bananas   Watermelon  Cantaloupe  Cuties    Tips for reflux:  - Eat slowly (20-30 minutes per meal), chewing foods well (25 chews per bite/applesauce consistency)  - Wait 3 hours after eating before lying down.  - Eat in a calm, relaxed place. Sit down while you eat  - Allow time between eating and sleeping. Eating too close to bed can cause reflux  - Reduce stress. Stress can cause reflux.    Possible reflux triggers:  - alcohol, particularly red wine.  - chocolate  - citrus (lemon, orange juice, etc.)  - black pepper  - garlic  - raw onions  - spicy foods  - coffee and caffeinated drinks, including tea and soda.  - peppermint.  - tomatoes  - carbonation        Follow-Up:  Monday, September 30th at 2:30 pm with Tara Molina    Time spent with patient: 18  minutes.  GLADYS Quintanilla, RD, LD  Clinic #: [phone]

## 2024-08-30 ENCOUNTER — VIRTUAL VISIT (OUTPATIENT)
Dept: ENDOCRINOLOGY | Facility: CLINIC | Age: 24
End: 2024-08-30
Payer: COMMERCIAL

## 2024-08-30 VITALS — BODY MASS INDEX: 43.2 KG/M2 | HEIGHT: 63 IN | WEIGHT: 243.8 LBS

## 2024-08-30 DIAGNOSIS — Z71.3 NUTRITIONAL COUNSELING: Primary | ICD-10-CM

## 2024-08-30 DIAGNOSIS — E66.9 OBESITY: ICD-10-CM

## 2024-08-30 PROCEDURE — 99207 PR NO CHARGE LOS: CPT | Mod: 95

## 2024-08-30 PROCEDURE — 97803 MED NUTRITION INDIV SUBSEQ: CPT | Mod: 95

## 2024-08-30 ASSESSMENT — PAIN SCALES - GENERAL: PAINLEVEL: SEVERE PAIN (6)

## 2024-08-30 NOTE — NURSING NOTE
Current patient location: [address] [place] [id number]    Is the patient currently in the state of [place]? YES    Visit mode:VIDEO    If the visit is dropped, the patient can be reconnected by: VIDEO VISIT: Text to cell phone:   Telephone Information:   Mobile [phone]       Will anyone else be joining the visit? NO  (If patient encounters technical issues they should call [phone]    How would you like to obtain your AVS? MyChart    Are changes needed to the allergy or medication list? N/A    Are refills needed on medications prescribed by this physician? NO    Rooming Documentation:  Not applicable      Reason for visit: RECHECK    Pt states 6/10 cramping today chronic.     Emigdio BARKSDALEF

## 2024-08-30 NOTE — LETTER
"2024       RE: Regina Marshall  [id number] [facility] [address]     Dear Colleague,    Thank you for referring your patient, Regina Marshall, to the [facility] [place] at [facility]. Please see a copy of my visit note below.    Video-Visit Details    Type of service:  Video Visit    Video Start Time: 2:26 pm   Video End Time: 2:44 pm    Originating Location (pt. Location): Home    Distant Location (provider location):  Offsite (providers home) [facility] [place]     Platform used for Video Visit: Workube    Weight Management Nutrition Consultation    Regina Marshall is a 24 year old female presents today for weight management nutrition consultation.  Patient referred by Vicenta Ward PA-C on May 21, 2024.    Patient with Co-morbidities of obesity includin/20/2024     3:48 PM   --   I have the following health issues associated with obesity None of the above   I have the following symptoms associated with obesity Knee Pain    Depression    Back Pain    Fatigue    Groin Rash    Hip Pain       Anthropometrics:  Weight 24: 270 lbs with BMI 46.32    Estimated body mass index is 43.85 kg/m  as calculated from the following:    Height as of this encounter: 1.588 m (5' 2.52\").    Weight as of this encounter: 110.6 kg (243 lb 12.8 oz).    Current weight: 243 lbs, pt report (down 10 lbs since last RD visit)    Medications for Weight Loss:  Wegovy prescribed 24 - still at 1 mg, no side effects.    Also takes Vyvanse     NUTRITION HISTORY  Food allergies: NKFA  Food intolerances: None  Does not like certain fruits/vegetables - texture related per pt. Does not like mushrooms/avocado for example.   Dx with GERD recently - was having N/V. Improved with weight loss in December per chart review but worse recently with larger portion sizes/eating at night.   Vitamin/Mineral " "Supplements: Did not discuss today  Previous methods of diet modification for weight loss: cleanse in December - was helpful per pt. Advocare cleanse, took cleanse pills and followed certain recipes or meal replacements.   RD before: Has worked with a nutritionist before but does not recall details - was young per pt.     Pt goals: weight loss to be healthier/feel better and be more active   - Wants to decrease night time snacking.    - Hoping for future breast reduction per chart review     Weight gain associated with Abilify. Not able to go off of per pt - has been super helpful in other areas.     Recently quit vaping - increased hunger since then.    Per PA note   Eating 1 large meal a day, with 2 snacks during the day and 1 snack after dinner. Hunger is well controlled during the day, but then really hungry from dinner on. Larger portions especially at dinner. Eats very quickly. I hard to get full and stay full from dinner on. Craves pasta, \"junk food\", and dessert. Emotional eating, especially with depression symptoms. Boredom eating - \"if has nothing to do will eat\". Drinks water, juice (1 glass), milk (1 glass), coffee (2 large espresso shaker from [employer]).      Dinner meal choices -  whatever foster mom makes  such as sonal     Feels snacking is bad and wants to work on. Gets hungry late at night. Wants to better control this.     Barriers to nutrition: not wanting to eat and not hungry    BM: IBS - C/D at baseline, typically more diarrhea. Has seen GI/Urology and PCP.     PA: walks/bikes to work (3 miles round trip)    - Limited by fatigue but wants to increase     July 2, 2024:  Not eating as much- only eating one meal right now and has been smaller portion. Has a lifesaver here and there throughout the day. Not feeling as hungry in the morning, but then gets very hungry at night. Sometimes will have a protein shake.    Hydration: Coffee in AM but nothing else until 9:00, will drink 1 pop per day, " drinks water 2 water bottles (24 oz- 48 total oz per day)    PA: Continues with walking/biking to work (3 miles)    July 31, 2024:  Has been eating more fruit and more consistent meals. Feels she needs to eat more protein.    B- cereal (frosted flakes, rice krispies) with milk  L- varies, salami sandwiches (cheese and cross) with vegetables (tomatoes, peppers)   D- Varies, whatever she is cooking. Tomorrow making chicken caprese burgers (curtis with mozzarella and bun). Tonight having mac and cheese meatloaf, had a salad last night (lettuce/cucumbers/raspberry vinaigrette)  Snack: fruit, greek yogurt    Hydration: Drinking a lot of coffee, not having any water per day currently.    PA: Goes on a couple of walks per week.     August 2024:  Pt reports she has been eating less carbohydrates and focusing more on protein and fiber foods. Not eating as much pastas or breads. Has been consistently having three meals per day. For protein, is eating meats (beef and salami, ground turkey) and nuts. For fiber, is eating more fruits and vegetables like apples and carrots for example.    B- bowl of rice krispies with almond milk  L- meat with fruits and vegetables (I.e. carrots), peanut butter and jelly  D- Similar to lunch    Hydration: Drinking more water than previously, 1 water bottle (35-40 oz), using sugar free raspberry lemonade mix to help with flavoring water    PA: Got a bike and has been using that more often. Someone in her household recently had covid so she wasn't able to get out as much as she would have liked. Using the treadmill once/week and then would bike or walk 1-2 times/day. Recently got a new job and plans to bike to and from work.    Previous goals:  1. Aim to start biking again - Met  2. Aim to walk on the treadmill twice/week - In progress  3. Aim for 3 eating periods per day (aim for protein/fiber) - Met  4. Aim for 2 water bottles per day (48 oz total), look into flavored water options - In  progress    Additional information:  New job-  (M-F)    Previously was living with foster family - 2 other foster sisters and 8 year old daughter per Vicenta Thorne's note        5/20/2024     3:48 PM   Diet Recall Review with Patient   How many glasses of juice do you drink in a typical day? 1   How many of glasses of milk do you drink in a typical day? 1   If you do drink milk, what type? 1%   How many 8oz glasses of sugar containing drinks such as Rajeev-Aid/sweet tea do you drink in a day? 0   How many cans/bottles of sugar pop/soda/tea/sports drinks do you drink in a day? 2   How many cans/bottles of diet pop/soda/tea or sports drink do you drink in a day? 0   How often do you have a drink of alcohol? Monthly or Less   If you do drink, how many drinks might you have in a day? 1 or 2           5/20/2024     3:48 PM   Eating Habits   Generally, my meals include foods like these bread, pasta, rice, potatoes, corn, crackers, sweet dessert, pop, or juice Almost Everyday   Generally, my meals include foods like these fried meats, brats, burgers, french fries, pizza, cheese, chips, or ice cream A Few Times a Week   Eat fast food (like McDonalds, Burger Gen, Taco Bell) Once a Week   Eat at a buffet or sit-down restaurant Never   Eat most of my meals in front of the TV or computer A Few Times a Week   Often skip meals, eat at random times, have no regular eating times Everyday   Rarely sit down for a meal but snack or graze throughout Almost Everyday   Eat extra snacks between meals Almost Everyday   Eat most of my food at the end of the day Everyday   Eat in the middle of the night or wake up at night to eat Everyday   Eat extra snacks to prevent or correct low blood sugar Never   Eat to prevent acid reflux or stomach pain Never   Worry about not having enough food to eat Never   I eat when I am depressed Everyday   I eat when I am stressed Almost Everyday   I eat when I am bored Almost Everyday   I eat when I  am anxious Everyday   I eat when I am happy or as a reward Everyday   I feel hungry all the time even if I just have eaten Everyday   Feeling full is important to me Less Than Weekly   I finish all the food on my plate even if I am already full Almost Everyday   I can't resist eating delicious food or walk past the good food/smell Once a Week   I eat/snack without noticing that I am eating Almost Everyday   I eat when I am preparing the meal Almost Everyday   I eat more than usual when I see others eating Almost Everyday   I have trouble not eating sweets, ice cream, cookies, or chips if they are around the house Almost Everyday   I think about food all day Almost Everyday   What foods, if any, do you crave? Chips/Crackers   Please list any other foods you crave? Sweets           5/20/2024     3:48 PM   Amount of Food   I feel out of control when eating Almost Everyday   I eat a large amount of food, like a loaf of bread, a box of cookies, a pint/quart of ice cream, all at once Almost Everyday   I eat a large amount of food even when I am not hungry Weekly   I eat rapidly Everyday   I eat alone because I feel embarrassed and do not want others to see how much I have eaten Monthly   I eat until I am uncomfortably full Almost Everyday   I feel bad, disgusted, or guilty after I overeat Everyday         5/20/2024     3:48 PM   Activity/Exercise History   How much of a typical 12 hour day do you spend sitting? Less Than Half the Day   How much of a typical 12 hour day do you spend lying down? Less Than Half the Day   How much of a typical day do you spend walking/standing? Half the Day   How many hours (not including work) do you spend on the TV/Video Games/Computer/Tablet/Phone? 2-3 Hours   How many times a week are you active for the purpose of exercise? 2-3 Times a Week   What keeps you from being more active? Pain    Unsure What To Do    Other   How many total minutes do you spend doing some activity for the purpose  of exercising when you exercise? 15-30 Minutes       Nutrition Prescription  Recommended energy/nutrient modification.    Nutrition Diagnosis  Obesity r/t long history of positive energy balance aeb BMI >30.    Nutrition Intervention  Reviewed current dietary habits and pts history   Reviewed and modified previous goals  Answered pt questions  Coordination of care   Nutrition education   AVS and handouts via [employer]    Expected Engagement: good    Nutrition Goals  1. Aim to walk 2-3 times/week outdoors  2. Aim for 3 eating periods per day (aim for protein/fiber)  3. Aim for 2 water bottles per day (48 oz total), consider going to the grocery to get sugar free flavored water options    Sugar free flavored water options:  - Hint water  - Heath drops  - Crystal light  - Nestle Splash water  - Sparkling icee  - SF lemonade from the [employer] store    Foods to bring on the go:  - Protein shake  - Protein bar  - Greek yogurt (focus on high protein/low sugar)  - [place] cheese/nut packs  - Canned tuna or chicken with plain nonfat greek yogurt with whole wheat crackers or toast  - Fruit (apples, grapes, strawberries, mangos, or see fruit ideas list below for other options)  - Vegetables (cucumbers, carrots, bell peppers, snap peas) with hummus or low fat ranch    Items to consider at meals time:   Activa yogurt  String cheese  Toast with peanut butter  Wrap with deli meat  Wrap with eggs   *Could add fruit/vegetable to side or vegetables into wrap     Fruit ideas:  Strawberries  Neeraj  Apples  Bananas   Watermelon  Cantaloupe  Cuties    Tips for reflux:  - Eat slowly (20-30 minutes per meal), chewing foods well (25 chews per bite/applesauce consistency)  - Wait 3 hours after eating before lying down.  - Eat in a calm, relaxed place. Sit down while you eat  - Allow time between eating and sleeping. Eating too close to bed can cause reflux  - Reduce stress. Stress can cause reflux.    Possible reflux triggers:  - alcohol,  particularly red wine.  - chocolate  - citrus (lemon, orange juice, etc.)  - black pepper  - garlic  - raw onions  - spicy foods  - coffee and caffeinated drinks, including tea and soda.  - peppermint.  - tomatoes  - carbonation        Follow-Up:  Monday, September 30th at 2:30 pm with Tara Molina    Time spent with patient: 18 minutes.  GLADYS Quintanilla, SHELLEY, LD  Clinic #: [phone]          Again, thank you for allowing me to participate in the care of your patient.      Sincerely,    Pricilla Bass RD

## 2024-08-30 NOTE — PATIENT INSTRUCTIONS
Ernesto MCKNIGHT,    Please follow-up with dietitian on Monday, September 30th at 2:30 pm with Tara Molina.    Thank you,    Pricilla Bass, MPS, RD, LD  If you need to schedule or reschedule, please call [phone].    Nutrition Goals:  1. Aim to walk 2-3 times/week outdoors  2. Aim for 3 eating periods per day (aim for protein/fiber)  3. Aim for 2 water bottles per day (48 oz total), consider going to the grocery to get sugar free flavored water options    Sugar free flavored water options:  - Hint water  - [place] drops  - Crystal light  - Nestle Splash water  - Sparkling icee  - SF lemonade from the [employer] store    Foods to bring on the go:  - Protein shake  - Protein bar  - Greek yogurt (focus on high protein/low sugar)  - Sargento cheese/nut packs  - Canned tuna or chicken with plain nonfat greek yogurt with whole wheat crackers or toast  - Fruit (apples, grapes, strawberries, mangos, or see fruit ideas list below for other options)  - Vegetables (cucumbers, carrots, bell peppers, snap peas) with hummus or low fat ranch    Items to consider at meals time:   Activa yogurt  String cheese  Toast with peanut butter  Wrap with deli meat  Wrap with eggs   *Could add fruit/vegetable to side or vegetables into wrap     Fruit ideas:  Strawberries  [place]  Apples  Bananas   Watermelon  Cantaloupe  Cuties    Tips for reflux:  - Eat slowly (20-30 minutes per meal), chewing foods well (25 chews per bite/applesauce consistency)  - Wait 3 hours after eating before lying down.  - Eat in a calm, relaxed place. Sit down while you eat  - Allow time between eating and sleeping. Eating too close to bed can cause reflux  - Reduce stress. Stress can cause reflux.    Possible reflux triggers:  - alcohol, particularly red wine.  - chocolate  - citrus (lemon, orange juice, etc.)  - black pepper  - garlic  - raw onions  - spicy foods  - coffee and caffeinated drinks, including tea and soda.  - peppermint.  - tomatoes  - carbonation

## 2024-09-20 ENCOUNTER — VIRTUAL VISIT (OUTPATIENT)
Dept: CARDIOLOGY | Facility: CLINIC | Age: 24
End: 2024-09-20
Payer: COMMERCIAL

## 2024-09-20 VITALS — BODY MASS INDEX: 42.81 KG/M2 | WEIGHT: 238 LBS

## 2024-09-20 DIAGNOSIS — E66.1 CLASS 3 DRUG-INDUCED OBESITY WITH SERIOUS COMORBIDITY AND BODY MASS INDEX (BMI) OF 45.0 TO 49.9 IN ADULT (H): Primary | ICD-10-CM

## 2024-09-20 DIAGNOSIS — F33.41 RECURRENT MAJOR DEPRESSIVE DISORDER, IN PARTIAL REMISSION (H): ICD-10-CM

## 2024-09-20 DIAGNOSIS — E66.813 CLASS 3 DRUG-INDUCED OBESITY WITH SERIOUS COMORBIDITY AND BODY MASS INDEX (BMI) OF 45.0 TO 49.9 IN ADULT (H): Primary | ICD-10-CM

## 2024-09-20 DIAGNOSIS — Z97.5 IUD (INTRAUTERINE DEVICE) IN PLACE: ICD-10-CM

## 2024-09-20 DIAGNOSIS — F41.1 GAD (GENERALIZED ANXIETY DISORDER): ICD-10-CM

## 2024-09-20 DIAGNOSIS — Z78.9 TAKES DIETARY SUPPLEMENTS: ICD-10-CM

## 2024-09-20 DIAGNOSIS — F43.10 POSTTRAUMATIC STRESS DISORDER: ICD-10-CM

## 2024-09-20 DIAGNOSIS — F43.10 PTSD (POST-TRAUMATIC STRESS DISORDER): ICD-10-CM

## 2024-09-20 DIAGNOSIS — K21.9 GERD WITHOUT ESOPHAGITIS: ICD-10-CM

## 2024-09-20 DIAGNOSIS — J30.2 SEASONAL ALLERGIES: ICD-10-CM

## 2024-09-20 DIAGNOSIS — F90.9 ATTENTION DEFICIT HYPERACTIVITY DISORDER (ADHD), UNSPECIFIED ADHD TYPE: ICD-10-CM

## 2024-09-20 RX ORDER — MIRTAZAPINE 15 MG/1
15 TABLET, FILM COATED ORAL AT BEDTIME
COMMUNITY
Start: 2024-09-07 | End: 2024-09-20 | Stop reason: DRUGHIGH

## 2024-09-20 RX ORDER — SEMAGLUTIDE 1.7 MG/.75ML
1.7 INJECTION, SOLUTION SUBCUTANEOUS
Qty: 3 ML | Refills: 3 | Status: SHIPPED | OUTPATIENT
Start: 2024-09-20

## 2024-09-20 RX ORDER — SODIUM FLUORIDE 6 MG/ML
0.5 PASTE, DENTIFRICE DENTAL DAILY
COMMUNITY
Start: 2024-09-06

## 2024-09-20 RX ORDER — MIRTAZAPINE 15 MG/1
22.5 TABLET, FILM COATED ORAL AT BEDTIME
COMMUNITY

## 2024-09-20 ASSESSMENT — PAIN SCALES - GENERAL: PAINLEVEL: NO PAIN (0)

## 2024-09-20 NOTE — Clinical Note
Ernesto Stout - given last vitamin d level was elevated for Z and she states she wasn't taking vitamin D at that time, we decided to stop for now and reassess level in 3 months without supplementation.  I couldn't find another strong indication for vitamin d, but please correct me and I will reorder the vitamin D before our follow up. Otherwise, our plan is to recheck vitD level in November without supplementaion.  Liliana Cervantes, Pharm D., MPH  Medication Therapy Management Pharmacist  [facility]

## 2024-09-20 NOTE — PATIENT INSTRUCTIONS
"Recommendations from MTM Pharmacist visit:                                                    MTM (medication therapy management) is a service provided by a clinical pharmacist designed to help you get the most of out of your medicines.  You may be sent a phone or email survey evaluating today's visit.  Please provide feedback you have for the service he received today if you are able.      Stop vitamin D supplement for now. We will recheck vitamin D level in 3 months to evaluate safety and efficacy of vitamin d dosing.    Continue Wegovy 1.7 mg weekly.    To help with tolerability and effectiveness of Wegovy :  Eat small meals/snacks throughout the day (about every 2-4 hours)  Focus on getting protein in first with each meal and snack.   A good starting goal is 60 g protein daily (track this, especially if at weight loss plateau). Once you consistently are getting 60g daily, try getting 90 g protein daily.  Drink plenty of water - goal 64 oz throughout the day  You may try Metamucil, Benefiber, or Citrucel to help feel more full (less nausea) and have softer, more consistent bowel movements.  To optimize weight management - work on incorporating resistance training/weight lifting to build muscle and improve overall metabolism of adipose tissue.      Follow-up: 9/30 with Tara Molina, Registered Dietician  10/25 with Mary Mane, CNP    11/27 with Liliana Cervantes, [facility]         It was great speaking with you today.  I value your experience and would be very thankful for your time in providing feedback in our clinic survey. In the next few days, you may receive an email or text message from [facility] [employer] with a link to a survey related to your  clinical pharmacist.\"     To schedule another MTM appointment, please call the clinic directly ([facility] Phone Number: [phone] (schedules for [facility] and [facility] - providers, dietitians, health coaches) or you may call the " [facility] scheduling line at [phone] or toll-free at [phone].     My Clinical Pharmacist's contact information:                                                      Please feel free to contact me with any questions or concerns you have.      Liliana Cervantes, Pharm D., MPH    Medication Therapy Management Pharmacist   [facility]          Meal Replacement Shake Options:   *Protein Shake Criteria: no more than 210 Calories, at least 20 grams of protein, and less than 10 grams of sugar   Premier Protein (160 Calories, 30 g protein)  Slim Fast Advanced Nutrition (180 Calories, 20 g protein)  Muscle Milk, lactose-free, 17 oz bottle (210 Calories, 30 g protein)  Integrated Supplements, no artificial sugars (110 Calories, 20 g protein)  Boost/Ensure Max (160 calories, 30 gm protein)   Fairlife Protein Shakes (160-230 calories, 26-42 gm protein)  Aldi's Elevation Protein Powder (180 calories, 30 gm protein)   Orgain Protein Shakes (130-160 calories, 20-26 gm protein)     Meal Replacement Bar Options:  Quest Protein Bars (190 Calories, 20 g protein)  Built Bar (170 Calories, 15-20 g protein)  One Protein Bar (210 calories, 20 g protein)  [place] Signature Protein Bar ([employer]) (190 Calories, 21 g protein)  Pure Protein Bars (180 Calories, 21 g protein)    Low Calorie Frozen Meal:  Healthy Choice Power Bowls  Lean Cuisine  Smart Ones  Calvin Wesley      ---------------------------------------------------------------  Tips to Increase the Protein in Your Diet  You may need more protein in your diet to help you heal from an illness, surgery or wound. Extra protein can also help you gain weight. Here are some ideas for adding high-protein foods to your meals.  Meat and fish  Add chopped cooked meat to vegetables, salads, casseroles, soups, sauces and biscuit dough.  Use in omelets, soufflés, quiches, sandwich fillings and chicken or turkey stuffing.  Wrap in pie crust or biscuit  dough to make a turnover.  Add to stuffed baked potatoes.  Make a dip with diced meat or flaked fish mixed with sour cream and spices.  Chopped, hard-cooked eggs  Add to salads.  Use for snacks and sandwich filling.  High-protein milk  To make high-protein milk, mix 1 quart whole milk with 1 cup powdered milk.  Add to cream soups, mashed potatoes, scrambled eggs, cereals and dried eggnog mix.  Use as an ingredient in puddings, custards, hot chocolate, milk shakes and pancakes.  Powdered milk  If you don't have any high-protein milk on hand, you can use powdered milk. Add 3 tablespoons to:  gravies, sauces, cream soups, mayonnaise  casseroles, meat patties, meatloaf, tuna salad, deviled ham  scalloped or mashed potatoes, creamed spinach  scrambled eggs, egg salad  cereals  yogurt, milk drinks, ice cream, frozen desserts, puddings, custards.  Add 4 to 6 tablespoons powdered milk to make:  cream sauces  breads, muffins, pancakes, waffles, cookies, cakes  cream pies, frostings, cake fillings  fruit cobblers, bread or rice pudding, gelatin desserts.  For high-protein eggnog, add 3 to 6 tablespoons powdered milk to prepared eggnog.  Hard or soft cheese  Melt on sandwiches, breads, tortillas, hamburgers, hot dogs, other meats, vegetables, eggs and pies.  Grate into soups, chili, sauces, casseroles, vegetables, potatoes, rice, noodles or meatloaf.  Eat with toast or crackers, or melt for camilo dip.  Cottage cheese or ricotta cheese  Mix with or scoop on top of fruits and vegetables.  Add to casseroles, lasagna, spaghetti, noodles and egg dishes (omelets, scrambled eggs, soufflés).  Use in gelatin, pudding-type desserts, cheesecake and pancake batter.  Use to stuff crepes, pasta shells or manicotti.  Fruit yogurt  Blend with fruits for a fruit smoothie.  Use as a dip for fruits and vegetables.  Scoop on top of pancakes or waffles.  Tofu  Blend silken tofu with fruits and juices for a smoothie.  Add chunks of firm tofu to  soups and stews, or crumble into meatloaf.  Blend dried onion soup mix into soft or silken tofu for dip.  Use pureed silken tofu for part of the mayonnaise, sour cream, cream cheese or ricotta cheese called for in recipes.  Beans  Use cooked beans or peas in soups, casseroles, pasta, tacos and burritos.  Nuts and seeds  Note: For children under 3, discuss with the child's care team.  Use in casseroles, breads, muffins, pancakes, cookies and waffles.  Sprinkle on fruits, cereals, ice cream, yogurt, vegetables and salads.  Mix with raisins, dried fruits and chocolate chips for a snack.  Nut butters  Note: For children under 3, discuss with the child's care team.  Spread on sandwiches, toast, muffins, crackers, waffles, pancakes and fruit slices.  Use as a dip for raw vegetables.  Blend with milk drinks, or swirl through ice cream, yogurt or hot cereal.  Nutrition supplements (nutrition bars, drinks and powders)  Add powders to milk drinks and desserts.  Mix with ice cream, milk and fruit for a high-protein milk shake.    For informational purposes only. Not to replace the advice of your health care provider. Clinically reviewed by Jyoti Cherry, SHELLEY, LD, and the Clinical Nutrition Service Line. Copyright   2005 [facility]. All rights reserved. [employer] 723767 - REV 04/24.      -----------------------------------------------------------------------------------------------------------------  Learning About High-Protein Foods  What foods are high in protein?     The foods you eat contain nutrients, such as vitamins and minerals. Protein is a nutrient. Your body needs the right amount to stay healthy and work as it should. You can use the list below to help you make choices about which foods to eat.  Here are some examples of foods that are high in protein.  Dairy and dairy alternatives  Cheese  Milk  Soy milk  Yogurt (especially Greek)  Meat  Beef  Chicken  Ham  Lamb  Lunch  "meat  Pork  Sausage  Turkey  Other protein foods  Beans (black, garbanzo, kidney, lima)  Eggs  Hummus  Lentils  Nuts  Peanut butter and other nut butters  Peas  Soybeans  Tofu  Veggie or soy curtis (Check the nutrition label for the amount of protein in each serving.)  Seafood  Anchovies  Cod  Crab  Halibut  [place]way  Sardines  Shrimp  Tilapia  Ophiem  Tuna  Protein supplements  Bars (Check the nutrition label for the amount of protein in each serving.)  Drinks  Powders  Work with your doctor to find out how much of this nutrient you need. Depending on your health, you may need more or less of it in your diet.  Where can you learn more?  Go to https://www.Classkick.net/patiented  Enter P335 in the search box to learn more about \"Learning About High-Protein Foods.\"  Current as of: September 20, 2023               Content Version: 14.0    1181-6129 [employer], [employer].   Care instructions adapted under license by your healthcare professional. If you have questions about a medical condition or this instruction, always ask your healthcare professional. Healthwise, [employer] disclaims any warranty or liability for your use of this information.         "

## 2024-09-20 NOTE — NURSING NOTE
Current patient location: [address] [place] [id number]    Is the patient currently in the state of [place]? YES    Visit mode:TELEPHONE    If the visit is dropped, the patient can be reconnected by: TELEPHONE VISIT: Phone number:   Telephone Information:   Mobile [phone]       Will anyone else be joining the visit? NO  (If patient encounters technical issues they should call [phone]    How would you like to obtain your AVS? MyChart    Are changes needed to the allergy or medication list? No    Are refills needed on medications prescribed by this physician? NO    Rooming Documentation:  Questionnaire(s) completed    Reason for visit: Consult    Stefani EDWARD

## 2024-09-20 NOTE — Clinical Note
Mk patient initially, has had follow up with Te. Also was seen by Pricilla initially for nutrition, but will transition to Tara.  We continued Wegovy 1.7mg for now, stopped vitamin d given elevated level before supplementation. [facility] with [facility] next 3 months.  Working with psychiatry to reduce mirtazapine and Abilify over time once stable to help reduce weight gain effects, BOOM Ford

## 2024-09-20 NOTE — LETTER
9/20/2024      RE: Regina Marshall  [address] [facility] [address]       Dear Colleague,    Thank you for the opportunity to participate in the care of your patient, Regina Marshall, at the [facility] [place] [facility]. Please see a copy of my visit note below.    Medication Therapy Management (MTM) Encounter    ASSESSMENT:                            Medication Adherence/Access: No issues identified      Weight Management  Patient congratulated on >10 % total body weight loss and lifestyle modifications.     Patient would benefit from continuing pharmacotherapy for weight management. Given tolerating well, class III obesity, recommend optimizing GLP1/GIP therapy as data to support most significant weight loss and patient has no contraindications. Patient also realizing benefit from reduction in food noise and increased satiety. Recommend continuing on current dose and optimize lifestyle modifications on current dose before dose increase. Education provided on continued dietary and behavioral modifications.         Anxiety/depression/PTSD/ADHD: agree with plan to stabilize mental health pharmacotherapy and then work to decrease weight positive meds - mirtazapine and aripiprazole to lowest effective dose to reduce side effects.    Supplements:  given elevated vitamin d level without supplementation, recommend trial without for 3 months and will recheck without supplementation.    GERD: stable     Allergies:stable     Contraception:  stable     PLAN:                            Stop vitamin D supplement for now. We will recheck vitamin D level in 3 months to evaluate safety and efficacy of vitamin d dosing.    Continue Wegovy 1.7 mg weekly.    To help with tolerability and effectiveness of Wegovy :  Eat small meals/snacks throughout the day (about every 2-4 hours)  Focus on getting protein in first with each meal and snack.   A good  starting goal is 60 g protein daily (track this, especially if at weight loss plateau). Once you consistently are getting 60g daily, try getting 90 g protein daily.  Drink plenty of water - goal 64 oz throughout the day  You may try Metamucil, Benefiber, or Citrucel to help feel more full (less nausea) and have softer, more consistent bowel movements.  To optimize weight management - work on incorporating resistance training/weight lifting to build muscle and improve overall metabolism of adipose tissue.      Follow-up: 9/30 with Tara Molina, Registered Dietician  10/25 with Mary Mane CNP    11/27 with Liliana Cervantes [facility]     SUBJECTIVE/OBJECTIVE:                          ROXANNA Marshall is a 24 year old female seen for an initial visit. She was referred to me from Vicenta Ward .      Reason for visit: Medication Therapy Management .    Allergies/ADRs: Reviewed in chart  Past Medical History: Reviewed in chart  Tobacco: She reports that she has never smoked. She has never used smokeless tobacco.  Alcohol: Less than 1 beverage / month; one or two beverages if occasion to consume    Medication Adherence/Access: no issues reported    Weight Management  Wegovy 1.7 mg once weekly x 2 weeks  Bupropion  mg daily (for mental health)  Vyvanse 40 mg daily (for mental health)    Return Medical Weight Management Visit 7/18/24 with Mary Mane CNP   New Medical Weight Managment Visit 5/21/24 with Vicenta Ward PA-C  Registered dietician visit with Pricilla Bass registered dietician 8/30/24    Patient reports no current medication side effects. One day had some nausea and vomiting - she notes this was on a day when didn't eat anything and biked to/from work. Knows this is not a healthy habit (was rushed/distracted) and endorses better tolerability and weight management when eating regularly and nourishing body throughout the day and with activity. Used ondansetron that day, but denies use in recent history - tolerating  well.    Nutrition/Eating Habits: Now eating more consistently throughout the day. THis has been big improvement from 1 meal per day. Focusing on protein and fruits, veggies, - less carbs.     water intake: 2-3 water bottles (20-24 oz)  .    Exercise/Activity: bikes to work 5 days per week; walks on the weekends.       Weight management history:    Per Initial visit notes with Vicenta Ward PA-C  Overweight onset at 19yo with start of Abilify. Previously weight stable around 140lbs and had no weight concerns. Since then weight gain has been gradual. Weight influenced by stress and mental health. Vaping cessation 1 bryanna ago, leading to increase hunger. Vyvanse controls hunger well during the day, leading to increase hunger at night. Able to lose 20lbs in December and has maintained around 10lbs of that weight loss. Is adopted, so minimal family hx. Wants to lose weight to improve overall health, feel better, and be more active.      Discussed AOMs to help with weight loss:   - Phentermine - contraindicated due to currently being on Vyvanse.   - Topiramate can be considered in the future with approval by psychiatrist. No hx of kidney disease or stones.   - Naltrexone can be considered in the future. No hx of liver disease. Not taking opioids.   - Wellbutrin - currently taking for mood. No side effects. No effect on weight.   - Metformin can be considered in the future. Concern for diarrhea side effect.   - GLP-1 to be started today. No hx of pancreatitis. No personal or known family hx of MTC or MENII. She has a hx of significant GERD that is moderately controlled on omeprazole 20mg BID. Symptoms improve when she does not have a snack at night and when she take her med at night. Symptoms also improve with weight loss. She will monitor symptoms very closely, may need to consider a change in PPI.        Per visit notes Mary Mane CNP    Big transition with eating 3 meals a day (was eating 1 per day) since moving to  "group home. Feeling good about change. Some nausea with 1mg wegovy so would recommend staying here for now. Discussed increase if needed prior to follow up.     Initial Weight (lbs): 266 lbs      Current weight today: 238 lb on 9/15 at home  Cumulative Weight Loss: -28 lb, -10.5 % from baseline    Wt Readings from Last 4 Encounters:   09/20/24 108 kg (238 lb)   08/30/24 110.6 kg (243 lb 12.8 oz)   08/09/24 111.9 kg (246 lb 9.6 oz)   07/31/24 114.8 kg (253 lb)     Estimated body mass index is 42.81 kg/m  as calculated from the following:    Height as of 8/30/24: 1.588 m (5' 2.52\").    Weight as of this encounter: 108 kg (238 lb).    Lab Results   Component Value Date    A1C 5.1 07/18/2024    GLC 87 07/18/2024     07/18/2024    POTASSIUM 4.3 07/18/2024    KYRA 9.3 07/18/2024    CHLORIDE 101 07/18/2024    CO2 24 07/18/2024    BUN 9.9 07/18/2024    CR 0.76 07/18/2024    GFRESTIMATED >90 07/18/2024    CHOL 179 07/18/2024     07/18/2024    HDL 63 07/18/2024    TRIG 79 07/18/2024    VITDT 63 (H) 07/18/2024    TSH 3.35 01/17/2024     Liver Function Studies -   Recent Labs   Lab Test 05/02/24  1200 01/17/24 2059   PROTTOTAL 9.1* 8.4*   ALBUMIN 4.4 4.5   BILITOTAL 0.2 0.3   ALKPHOS 107 97   AST  --  27   ALT 28 29        Anxiety/depression/PTSD/ADHD:   Mirtazpine 15 mg - 1.5 tab every night at bedtime  Abilify 15 mg once daily at bedtime  Sertraline 100mg tab - 200 mg  every night at bedtime   Bupropion  mg + 300 mg (450 mg) every morning    Hydroxyzine 50 mg - 1 tab as needed (cannot recall last time used).    Follows with Psychiatrist at [facility].    Feels mental health very stable at this time and has goal of reducing mirtazapine and aripiprazole over time as endorses these may contribute to weight gain. Goal is to revisit these doses in 2 months if patient able to maintain strong mental health until then. Denies side effects    Supplements:   Vitamin D 2000 international units once " daily  NaF Toothpaste 1.1% to bursh teeth twice daily for cavity treatment and preventino    No reported issues at this time.   Patient cannot recall why started vitamin d. Believes that started vitamin d supplement after last vitamin d level drawn.    GERD:   Omeprazole 20 mg twice daily     Patient reports no current symptoms. Wegovy not worsening symptoms.  Patient feels that current regimen is effective.    Allergies:   Cetrizine 10 mg once daily    No issues reported.     Contraception:  IUD in place.    Today's Vitals: Wt 108 kg (238 lb)   LMP 08/07/2024 (Exact Date)   BMI 42.81 kg/m    ----------------      I spent 18 minutes with this patient today. All changes were made via collaborative practice agreement with Vicenta Ward. A copy of the visit note was provided to the patient's provider(s).    A summary of these recommendations was sent via [employer].    Liliana Cervantes, Pharm D., MPH    Medication Therapy Management Pharmacist   [facility]      Telemedicine Visit Details  The patient's medications can be safely assessed via a telemedicine encounter.  Type of service:  Telephone visit  Originating Location (pt. Location): Home    Distant Location (provider location):  Off-site  Start Time:  2:02 PM  End Time:  2:20 PM     Medication Therapy Recommendations  Takes dietary supplements    Current Medication: vitamin D3 (CHOLECALCIFEROL) 50 mcg (2000 units) tablet (Discontinued)   Rationale: No medical indication at this time - Unnecessary medication therapy - Indication   Recommendation: Discontinue Medication   Status: Accepted per CPA                Please do not hesitate to contact me if you have any questions/concerns.     Sincerely,     Liliana Cervantes, [facility]

## 2024-09-20 NOTE — PROGRESS NOTES
Medication Therapy Management (MTM) Encounter    ASSESSMENT:                            Medication Adherence/Access: No issues identified      Weight Management  Patient congratulated on >10 % total body weight loss and lifestyle modifications.     Patient would benefit from continuing pharmacotherapy for weight management. Given tolerating well, class III obesity, recommend optimizing GLP1/GIP therapy as data to support most significant weight loss and patient has no contraindications. Patient also realizing benefit from reduction in food noise and increased satiety. Recommend continuing on current dose and optimize lifestyle modifications on current dose before dose increase. Education provided on continued dietary and behavioral modifications.         Anxiety/depression/PTSD/ADHD: agree with plan to stabilize mental health pharmacotherapy and then work to decrease weight positive meds - mirtazapine and aripiprazole to lowest effective dose to reduce side effects.    Supplements:  given elevated vitamin d level without supplementation, recommend trial without for 3 months and will recheck without supplementation.    GERD: stable     Allergies:stable     Contraception:  stable     PLAN:                            Stop vitamin D supplement for now. We will recheck vitamin D level in 3 months to evaluate safety and efficacy of vitamin d dosing.    Continue Wegovy 1.7 mg weekly.    To help with tolerability and effectiveness of Wegovy :  Eat small meals/snacks throughout the day (about every 2-4 hours)  Focus on getting protein in first with each meal and snack.   A good starting goal is 60 g protein daily (track this, especially if at weight loss plateau). Once you consistently are getting 60g daily, try getting 90 g protein daily.  Drink plenty of water - goal 64 oz throughout the day  You may try Metamucil, Benefiber, or Citrucel to help feel more full (less nausea) and have softer, more consistent bowel  movements.  To optimize weight management - work on incorporating resistance training/weight lifting to build muscle and improve overall metabolism of adipose tissue.      Follow-up: 9/30 with Tara Molina, Registered Dietician  10/25 with Mary Mane CNP    11/27 with Liliana Cervantes [facility]     SUBJECTIVE/OBJECTIVE:                          ROXANNA Marshall is a 24 year old female seen for an initial visit. She was referred to me from Vicenta Ward .      Reason for visit: Medication Therapy Management .    Allergies/ADRs: Reviewed in chart  Past Medical History: Reviewed in chart  Tobacco: She reports that she has never smoked. She has never used smokeless tobacco.  Alcohol: Less than 1 beverage / month; one or two beverages if occasion to consume    Medication Adherence/Access: no issues reported    Weight Management   Wegovy 1.7 mg once weekly x 2 weeks  Bupropion  mg daily (for mental health)  Vyvanse 40 mg daily (for mental health)    Return Medical Weight Management Visit 7/18/24 with Mary Mane CNP   New Medical Weight Managment Visit 5/21/24 with Vicenta Ward PA-C  Registered dietician visit with Pricilla Bass registered dietician 8/30/24    Patient reports no current medication side effects. One day had some nausea and vomiting - she notes this was on a day when didn't eat anything and biked to/from work. Knows this is not a healthy habit (was rushed/distracted) and endorses better tolerability and weight management when eating regularly and nourishing body throughout the day and with activity. Used ondansetron that day, but denies use in recent history - tolerating well.    Nutrition/Eating Habits: Now eating more consistently throughout the day. THis has been big improvement from 1 meal per day. Focusing on protein and fruits, veggies, - less carbs.     water intake: 2-3 water bottles (20-24 oz)  .    Exercise/Activity: bikes to work 5 days per week; walks on the weekends.       Weight management  history:    Per Initial visit notes with Vicenta Ward PA-C  Overweight onset at 17yo with start of Abilify. Previously weight stable around 140lbs and had no weight concerns. Since then weight gain has been gradual. Weight influenced by stress and mental health. Vaping cessation 1 bryanna ago, leading to increase hunger. Vyvanse controls hunger well during the day, leading to increase hunger at night. Able to lose 20lbs in December and has maintained around 10lbs of that weight loss. Is adopted, so minimal family hx. Wants to lose weight to improve overall health, feel better, and be more active.      Discussed AOMs to help with weight loss:   - Phentermine - contraindicated due to currently being on Vyvanse.   - Topiramate can be considered in the future with approval by psychiatrist. No hx of kidney disease or stones.   - Naltrexone can be considered in the future. No hx of liver disease. Not taking opioids.   - Wellbutrin - currently taking for mood. No side effects. No effect on weight.   - Metformin can be considered in the future. Concern for diarrhea side effect.   - GLP-1 to be started today. No hx of pancreatitis. No personal or known family hx of MTC or MENII. She has a hx of significant GERD that is moderately controlled on omeprazole 20mg BID. Symptoms improve when she does not have a snack at night and when she take her med at night. Symptoms also improve with weight loss. She will monitor symptoms very closely, may need to consider a change in PPI.        Per visit notes Mary Mane CNP    Big transition with eating 3 meals a day (was eating 1 per day) since moving to group home. Feeling good about change. Some nausea with 1mg wegovy so would recommend staying here for now. Discussed increase if needed prior to follow up.     Initial Weight (lbs): 266 lbs      Current weight today: 238 lb on 9/15 at home  Cumulative Weight Loss: -28 lb, -10.5 % from baseline    Wt Readings from Last 4 Encounters:  "  09/20/24 108 kg (238 lb)   08/30/24 110.6 kg (243 lb 12.8 oz)   08/09/24 111.9 kg (246 lb 9.6 oz)   07/31/24 114.8 kg (253 lb)     Estimated body mass index is 42.81 kg/m  as calculated from the following:    Height as of 8/30/24: 1.588 m (5' 2.52\").    Weight as of this encounter: 108 kg (238 lb).    Lab Results   Component Value Date    A1C 5.1 07/18/2024    GLC 87 07/18/2024     07/18/2024    POTASSIUM 4.3 07/18/2024    KYRA 9.3 07/18/2024    CHLORIDE 101 07/18/2024    CO2 24 07/18/2024    BUN 9.9 07/18/2024    CR 0.76 07/18/2024    GFRESTIMATED >90 07/18/2024    CHOL 179 07/18/2024     07/18/2024    HDL 63 07/18/2024    TRIG 79 07/18/2024    VITDT 63 (H) 07/18/2024    TSH 3.35 01/17/2024     Liver Function Studies -   Recent Labs   Lab Test 05/02/24  1200 01/17/24 2059   PROTTOTAL 9.1* 8.4*   ALBUMIN 4.4 4.5   BILITOTAL 0.2 0.3   ALKPHOS 107 97   AST  --  27   ALT 28 29        Anxiety/depression/PTSD/ADHD:   Mirtazpine 15 mg - 1.5 tab every night at bedtime  Abilify 15 mg once daily at bedtime  Sertraline 100mg tab - 200 mg  every night at bedtime   Bupropion  mg + 300 mg (450 mg) every morning    Hydroxyzine 50 mg - 1 tab as needed (cannot recall last time used).    Follows with Psychiatrist at [facility].    Feels mental health very stable at this time and has goal of reducing mirtazapine and aripiprazole over time as endorses these may contribute to weight gain. Goal is to revisit these doses in 2 months if patient able to maintain strong mental health until then. Denies side effects    Supplements:   Vitamin D 2000 international units once daily  NaF Toothpaste 1.1% to bursh teeth twice daily for cavity treatment and preventino    No reported issues at this time.   Patient cannot recall why started vitamin d. Believes that started vitamin d supplement after last vitamin d level drawn.    GERD:   Omeprazole 20 mg twice daily     Patient reports no current symptoms. Wegovy not " worsening symptoms.  Patient feels that current regimen is effective.    Allergies:   Cetrizine 10 mg once daily    No issues reported.     Contraception:  IUD in place.    Today's Vitals: Wt 108 kg (238 lb)   LMP 08/07/2024 (Exact Date)   BMI 42.81 kg/m    ----------------      I spent 18 minutes with this patient today. All changes were made via collaborative practice agreement with Vicenta Ward and Flex Ramierz DNP,, APRN CNP. A copy of the visit note was provided to the patient's provider(s).    A summary of these recommendations was sent via [employer].    Liliana Cervantes, Pharm D., MPH    Medication Therapy Management Pharmacist   [facility]      Telemedicine Visit Details  The patient's medications can be safely assessed via a telemedicine encounter.  Type of service:  Telephone visit  Originating Location (pt. Location): Home    Distant Location (provider location):  Off-site  Start Time:  2:02 PM  End Time:  2:20 PM     Medication Therapy Recommendations  Takes dietary supplements    Current Medication: vitamin D3 (CHOLECALCIFEROL) 50 mcg (2000 units) tablet (Discontinued)   Rationale: No medical indication at this time - Unnecessary medication therapy - Indication   Recommendation: Discontinue Medication   Status: Accepted per CPA

## 2024-09-25 ENCOUNTER — TELEPHONE (OUTPATIENT)
Dept: ENDOCRINOLOGY | Facility: CLINIC | Age: 24
End: 2024-09-25
Payer: COMMERCIAL

## 2024-09-25 NOTE — TELEPHONE ENCOUNTER
Left Voicemail (1st Attempt) and Sent Mychart (1st Attempt) for the patient to call back and schedule the following:    Appointment type: RET [facility] Nutrition  Provider: Tara Molina  Return date: Reschedule 9/30/24 appt as provider no longer available  Specialty phone number: [phone]  Additional appointment(s) needed: n/a  Additonal Notes: n/a

## 2024-11-08 ENCOUNTER — TELEPHONE (OUTPATIENT)
Dept: ENDOCRINOLOGY | Facility: CLINIC | Age: 24
End: 2024-11-08
Payer: COMMERCIAL

## 2024-11-11 NOTE — TELEPHONE ENCOUNTER
PA Initiation    Medication: WEGOVY SC  Insurance Company: [employer] - Phone [phone] Fax [phone]  Pharmacy Filling the Rx:    Filling Pharmacy Phone:    Filling Pharmacy Fax:    Start Date: 11/11/2024     Key [id number]

## 2024-11-12 NOTE — TELEPHONE ENCOUNTER
Prior Authorization Approval    Medication: WEGOVY SC  Authorization Effective Date: 11/11/2024  Authorization Expiration Date: 11/11/2025  Approved Dose/Quantity: one box /28 days  Reference #: [id number]   Insurance Company: [employer] - Phone [phone] Fax [phone]  Expected CoPay: $    CoPay Card Available:      Financial Assistance Needed: no  Which Pharmacy is filling the prescription:    Pharmacy Notified: no  Patient Notified: no

## 2024-11-13 ENCOUNTER — PATIENT OUTREACH (OUTPATIENT)
Dept: FAMILY MEDICINE | Facility: CLINIC | Age: 24
End: 2024-11-13
Payer: COMMERCIAL

## 2024-11-13 NOTE — TELEPHONE ENCOUNTER
Patient Quality Outreach    Patient is due for the following:   Depression  -  PHQ-9 needed      Topic Date Due    Flu Vaccine (1) 09/01/2024    COVID-19 Vaccine (1 - 2024-25 season) Never done     Chlamydia Screening    Action(s) Taken:   Schedule a nurse only visit for immunizations     Type of outreach:    Chart review performed, no outreach needed.    Questions for provider review:    None     Ced Benson CNA

## 2024-11-27 ENCOUNTER — VIRTUAL VISIT (OUTPATIENT)
Dept: PHARMACY | Facility: CLINIC | Age: 24
End: 2024-11-27
Payer: COMMERCIAL

## 2024-11-27 VITALS — BODY MASS INDEX: 40.29 KG/M2 | WEIGHT: 224 LBS

## 2024-11-27 DIAGNOSIS — E66.1 CLASS 3 DRUG-INDUCED OBESITY WITH SERIOUS COMORBIDITY AND BODY MASS INDEX (BMI) OF 45.0 TO 49.9 IN ADULT (H): ICD-10-CM

## 2024-11-27 DIAGNOSIS — E66.813 CLASS 3 DRUG-INDUCED OBESITY WITH SERIOUS COMORBIDITY AND BODY MASS INDEX (BMI) OF 45.0 TO 49.9 IN ADULT (H): ICD-10-CM

## 2024-11-27 RX ORDER — MIRTAZAPINE 7.5 MG/1
7.5 TABLET, FILM COATED ORAL AT BEDTIME
COMMUNITY
Start: 2024-10-28

## 2024-11-27 RX ORDER — SEMAGLUTIDE 1.7 MG/.75ML
1.7 INJECTION, SOLUTION SUBCUTANEOUS
Qty: 3 ML | Refills: 3 | Status: SHIPPED | OUTPATIENT
Start: 2024-11-27

## 2024-11-27 NOTE — PROGRESS NOTES
"Virtual Visit Details    Type of service:  Telephone Visit   Phone call duration: *** minutes   Originating Location (pt. Location): {patient location:472519::\"Home\"}  {PROVIDER LOCATION On-site should be selected for visits conducted from your clinic location or adjoining [facility] hospital, academic office, or other nearby [facility] building. Off-site should be selected for all other provider locations, including home:[id number]}  Distant Location (provider location):  {virtual location provider:[id number]}  "

## 2024-11-27 NOTE — PATIENT INSTRUCTIONS
"Recommendations from MTM Pharmacist visit:                                                    MTM (medication therapy management) is a service provided by a clinical pharmacist designed to help you get the most of out of your medicines.  You may be sent a phone or email survey evaluating today's visit.  Please provide feedback you have for the service he received today if you are able.      Continue Wegovy 1.7 mg weekly. Keep doing great work to get your protein and water in each day.     As discussed, we will have you work on indoor activity to help keep your body moving in the cold weather (see exercises below or you may work to increase your step count).      Eat 3 meals with protein focus daily to help with nausea. If you forget to eat, you may feel nausea as a hunger cue.  Focus on getting protein in first with each meal and snack.   A good starting goal is 60 g protein daily (track this, especially if at weight loss plateau). Once you consistently are getting 60g daily, try getting 90 g protein daily.  Drink plenty of water - goal 64 oz throughout the day  You may try Metamucil, Benefiber, or Citrucel to help feel more full (less nausea) and have softer, more consistent bowel movements.  To optimize weight management - work on incorporating resistance training/weight lifting to build muscle and improve overall metabolism of adipose tissue.      Follow-up: 1/10/25 with Vicenta Ward PA-C  3/21/25 with Liliana Cervantes, PharmD         It was great speaking with you today.  I value your experience and would be very thankful for your time in providing feedback in our clinic survey. In the next few days, you may receive an email or text message from [employer] with a link to a survey related to your  clinical pharmacist.\"     To schedule another MTM appointment, please call the clinic directly ([facility] Phone Number: [phone] (schedules for [facility] and [facility] " - providers, dietitians, health coaches) or you may call the [facility] scheduling line at [phone] or toll-free at [phone].     My Clinical Pharmacist's contact information:                                                      Please feel free to contact me with any questions or concerns you have.      Liliana Cervantes, Pharm D., MPH    Medication Therapy Management Pharmacist   [facility]          Meal Replacement Shake Options:   *Protein Shake Criteria: no more than 210 Calories, at least 20 grams of protein, and less than 10 grams of sugar   Premier Protein (160 Calories, 30 g protein)  Slim Fast Advanced Nutrition (180 Calories, 20 g protein)  Muscle Milk, lactose-free, 17 oz bottle (210 Calories, 30 g protein)  Integrated Supplements, no artificial sugars (110 Calories, 20 g protein)  Boost/Ensure Max (160 calories, 30 gm protein)   Fairlife Protein Shakes (160-230 calories, 26-42 gm protein)  Aldi's Elevation Protein Powder (180 calories, 30 gm protein)   Orgain Protein Shakes (130-160 calories, 20-26 gm protein)     Meal Replacement Bar Options:  Quest Protein Bars (190 Calories, 20 g protein)  Built Bar (170 Calories, 15-20 g protein)  One Protein Bar (210 calories, 20 g protein)  [place] Signature Protein Bar ([employer]) (190 Calories, 21 g protein)  Pure Protein Bars (180 Calories, 21 g protein)    Low Calorie Frozen Meal:  Healthy Choice Power Bowls  Gume Martinezisine  Smart Ones  Calvin Wesley      ---------------------------------------------------------------  Tips to Increase the Protein in Your Diet  You may need more protein in your diet to help you heal from an illness, surgery or wound. Extra protein can also help you gain weight. Here are some ideas for adding high-protein foods to your meals.  Meat and fish  Add chopped cooked meat to vegetables, salads, casseroles, soups, sauces and biscuit dough.  Use in omelets, soufflés, quiches, sandwich fillings  and chicken or turkey stuffing.  Wrap in pie crust or biscuit dough to make a turnover.  Add to stuffed baked potatoes.  Make a dip with diced meat or flaked fish mixed with sour cream and spices.  Chopped, hard-cooked eggs  Add to salads.  Use for snacks and sandwich filling.  High-protein milk  To make high-protein milk, mix 1 quart whole milk with 1 cup powdered milk.  Add to cream soups, mashed potatoes, scrambled eggs, cereals and dried eggnog mix.  Use as an ingredient in puddings, custards, hot chocolate, milk shakes and pancakes.  Powdered milk  If you don't have any high-protein milk on hand, you can use powdered milk. Add 3 tablespoons to:  gravies, sauces, cream soups, mayonnaise  casseroles, meat patties, meatloaf, tuna salad, deviled ham  scalloped or mashed potatoes, creamed spinach  scrambled eggs, egg salad  cereals  yogurt, milk drinks, ice cream, frozen desserts, puddings, custards.  Add 4 to 6 tablespoons powdered milk to make:  cream sauces  breads, muffins, pancakes, waffles, cookies, cakes  cream pies, frostings, cake fillings  fruit cobblers, bread or rice pudding, gelatin desserts.  For high-protein eggnog, add 3 to 6 tablespoons powdered milk to prepared eggnog.  Hard or soft cheese  Melt on sandwiches, breads, tortillas, hamburgers, hot dogs, other meats, vegetables, eggs and pies.  Grate into soups, chili, sauces, casseroles, vegetables, potatoes, rice, noodles or meatloaf.  Eat with toast or crackers, or melt for camilo dip.  Cottage cheese or ricotta cheese  Mix with or scoop on top of fruits and vegetables.  Add to casseroles, lasagna, spaghetti, noodles and egg dishes (omelets, scrambled eggs, soufflés).  Use in gelatin, pudding-type desserts, cheesecake and pancake batter.  Use to stuff crepes, pasta shells or manicotti.  Fruit yogurt  Blend with fruits for a fruit smoothie.  Use as a dip for fruits and vegetables.  Scoop on top of pancakes or waffles.  Tofu  Blend silken tofu with  fruits and juices for a smoothie.  Add chunks of firm tofu to soups and stews, or crumble into meatloaf.  Blend dried onion soup mix into soft or silken tofu for dip.  Use pureed silken tofu for part of the mayonnaise, sour cream, cream cheese or ricotta cheese called for in recipes.  Beans  Use cooked beans or peas in soups, casseroles, pasta, tacos and burritos.  Nuts and seeds  Note: For children under 3, discuss with the child's care team.  Use in casseroles, breads, muffins, pancakes, cookies and waffles.  Sprinkle on fruits, cereals, ice cream, yogurt, vegetables and salads.  Mix with raisins, dried fruits and chocolate chips for a snack.  Nut butters  Note: For children under 3, discuss with the child's care team.  Spread on sandwiches, toast, muffins, crackers, waffles, pancakes and fruit slices.  Use as a dip for raw vegetables.  Blend with milk drinks, or swirl through ice cream, yogurt or hot cereal.  Nutrition supplements (nutrition bars, drinks and powders)  Add powders to milk drinks and desserts.  Mix with ice cream, milk and fruit for a high-protein milk shake.    For informational purposes only. Not to replace the advice of your health care provider. Clinically reviewed by Jyoti Cherry, SHELLEY, LD, and the Clinical Nutrition Service Line. Copyright   2005 [facility]. All rights reserved. [employer] 716994 - REV 04/24.      -----------------------------------------------------------------------------------------------------------------  Learning About High-Protein Foods  What foods are high in protein?     The foods you eat contain nutrients, such as vitamins and minerals. Protein is a nutrient. Your body needs the right amount to stay healthy and work as it should. You can use the list below to help you make choices about which foods to eat.  Here are some examples of foods that are high in protein.  Dairy and dairy alternatives  Cheese  Milk  Soy milk  Yogurt (especially  "Greek)  Meat  Beef  Chicken  Ham  Lamb  Lunch meat  Pork  Sausage  Turkey  Other protein foods  Beans (black, garbanzo, kidney, lima)  Eggs  Hummus  Lentils  Nuts  Peanut butter and other nut butters  Peas  Soybeans  Tofu  Veggie or soy curtis (Check the nutrition label for the amount of protein in each serving.)  Seafood  Anchovies  Cod  Crab  Halibut  [place]  Sardines  Shrimp  [place]ia  [place]  Tuna  Protein supplements  Bars (Check the nutrition label for the amount of protein in each serving.)  Drinks  Powders  Work with your doctor to find out how much of this nutrient you need. Depending on your health, you may need more or less of it in your diet.  Where can you learn more?  Go to https://www.Bl[employer].net/patiented  Enter P335 in the search box to learn more about \"Learning About High-Protein Foods.\"  Current as of: September 20, 2023               Content Version: 14.0    6050-7053 [employer].   Care instructions adapted under license by your healthcare professional. If you have questions about a medical condition or this instruction, always ask your healthcare professional. [employer] disclaims any warranty or liability for your use of this information.    Patient Education   Resistance Training With Free Weights: Exercises  Introduction  Here are some examples of exercises for resistance training. Start each exercise slowly. Ease off the exercise if you start to have pain.  Your doctor or physical therapist will tell you when you can start these exercises and which ones will work best for you.  How to do the exercises  Chest fly (lying down)    Lie on a bench or exercise ball, and hold light weights straight up over your chest. You can also use soup cans or filled water bottles for weights. Do not lock your elbows. You can keep them slightly bent if that is more comfortable.  Slowly lower your arms, keeping them extended, until the weights are level with your chest or slightly " lower.  Slowly raise your arms until you are in the starting position.  Repeat 8 to 12 times.  Arm raise to the side (elbows bent)    Stand with your feet shoulder-width apart and your knees slightly bent. Or sit up straight in a chair.  Hold a 1- to 2-pound weight in each hand. The weight may be a dumbbell, a can of food, or a filled water bottle.  Bend your elbows 90 degrees while keeping them at your sides. With your palms facing in, hold the weights straight in front of you.  Slowly lift the weights and your elbows out to the sides to shoulder level, keeping your elbows bent. Keep your shoulders down and relaxed as you lift. If you find that you are shrugging your shoulders up toward your ears, your weights may be too heavy. Try using lighter weights (or even no weights).  Slowly lower the weights and your elbows until your elbows are back at your sides.  Repeat 8 to 12 times.  Biceps curl    Sit leaning forward with your legs slightly spread apart and your left hand on your left thigh.  Hold a 1- to 2-pound weight in your right hand. The weight may be a dumbbell, a can of food, or a filled water bottle.  Place your right elbow on your right thigh, keeping your elbow slightly bent.  Slowly lift (curl) the weight up and toward your chest.  Slowly return it to the starting point.  Repeat 8 to 12 times.  Repeat these steps with your other arm.  Follow-up care is a key part of your treatment and safety. Be sure to make and go to all appointments, and call your doctor if you are having problems. It's also a good idea to know your test results and keep a list of the medicines you take.  Current as of: June 5, 2023  Content Version: 14.2    2024 [facility] [email or web address].   Care instructions adapted under license by your healthcare professional. If you have questions about a medical condition or this instruction, always ask your healthcare professional. [employer] disclaims any warranty or liability for your  use of this information.     Patient Education   Resistance Training With Exercise Bands: Exercises  Introduction  Here are some examples of exercises for resistance training. Start each exercise slowly. Ease off the exercise if you start to have pain.  Your doctor or physical therapist will tell you when you can start these exercises and which ones will work best for you.  How to do the exercises  Side pull    Sit or stand up straight. Grasp an exercise band with your hands about shoulder-width apart.  Raise both arms overhead, palms of your hands facing forward.  Slowly pull one arm down and to the side, bending your elbow and stretching the band until your elbow is at shoulder height. Hold for 1 to 2 seconds.  Slowly return to the starting position with your arms straight up.  Repeat with the other arm.  Repeat 8 to 12 times with each arm.  Overhead pull    Sit or stand up straight. Grasp an exercise band with your hands about shoulder-width apart.  Raise both arms overhead, palms of your hands facing forward.  Slowly pull your hands apart, stretching the band. Hold for 1 to 2 seconds.  Slowly return to the starting position with your arms straight up.  Repeat 8 to 12 times.  Up-down pull    Sit or stand up straight. Grasp an exercise band with your hands about shoulder width apart.  Raise both arms overhead.  Bend your elbows until they are at shoulder height, with the stretched band either behind or in front of your head. Hold for 1 to 2 seconds.  Slowly return to the starting position with your arms straight up.  Repeat 8 to 12 times.  Chest-level pull    Sit or stand up straight. Grasp an exercise band with your hands about shoulder-width apart.  Raise your arms to chest level and bend your elbows.  Slowly pull your hands apart and your shoulder blades together, stretching the band. Hold for 1 to 2 seconds. Try to keep your hands up at your chest level, and do not pull your shoulders up toward your  ears.  Slowly return to your starting position.  Repeat 8 to 12 times.  Hip-level pull    Stand or sit up straight in a chair without arms. Grasp an exercise band with your hands about shoulder-width apart.  Hold your hands at the level of your hips, or near your lap if you are sitting down.  Slowly pull your hands apart, stretching the band. Hold for 1 or 2 seconds.  Slowly return to your starting position.  Repeat 8 to 12 times.  Follow-up care is a key part of your treatment and safety. Be sure to make and go to all appointments, and call your doctor if you are having problems. It's also a good idea to know your test results and keep a list of the medicines you take.  Current as of: June 5, 2023  Content Version: 14.2 2024 [employer].   Care instructions adapted under license by your healthcare professional. If you have questions about a medical condition or this instruction, always ask your healthcare professional. [employer] disclaims any warranty or liability for your use of this information.     Patient Education   Muscle Conditioning: Exercises  Introduction  Here are some examples of exercises for muscle conditioning. Start each exercise slowly. Ease off the exercise if you start to have pain.  Your doctor or physical therapist will tell you when you can start these exercises and which ones will work best for you.  How to do the exercises  Wall push-up    Stand facing a wall with your feet about 12 to 24 inches from the wall. If you feel any pain when you do this exercise, stand closer to the wall.  Place your hands on the wall at shoulder height, slightly wider apart than your shoulders. Turn your fingers out a little, rather than straight up and down.  Slowly bend your elbows and bring your face toward the wall, keeping your shoulders and hips lined up. Then slowly push back to the starting position. Keep the motion smooth and controlled.  Repeat 8 to 12 times.  When you can  do this exercise against a wall with ease and no pain, you can try it against a counter. You can then slowly progress to the end of a couch, then to a sturdy chair, and finally to the floor.  Quadriceps (thigh) strengthening    While sitting in a chair, straighten one leg and hold for 6 seconds. Do not lock your knee. Then slowly lower your leg.  Repeat 8 to 12 times with each leg.  When this exercise becomes easy, you can add a light weight to your ankle.  Hip abduction (lying on side)    Lie on your side, with your affected leg on top. You can use your hand or a pillow to support your head.  Keep your knee straight and your leg in a straight line with your body.  Lift your affected leg straight up toward the ceiling, about 12 inches off the floor. Hold for about 6 seconds, then slowly lower your leg.  Repeat 8 to 12 times.  It's a good idea to repeat these steps on your other side.  Keep your kneecap pointing forward.  Don't let your hip drop back.  Shallow standing knee bend    Stand with your hands lightly resting on a counter or chair in front of you. Put your feet shoulder-width apart.  Slowly bend your knees so that you squat down like you're going to sit in a chair. Make sure that your knees don't go in front of your toes.  Lower yourself about 6 inches. Your heels should stay on the floor at all times.  Rise slowly to a standing position.  Repeat 8 to 12 times.  Follow-up care is a key part of your treatment and safety. Be sure to make and go to all appointments, and call your doctor if you are having problems. It's also a good idea to know your test results and keep a list of the medicines you take.  Current as of: June 5, 2023  Content Version: 14.2    2024 [employer].   Care instructions adapted under license by your healthcare professional. If you have questions about a medical condition or this instruction, always ask your healthcare professional. [employer] disclaims any  warranty or liability for your use of this information.

## 2024-11-27 NOTE — PROGRESS NOTES
Medication Therapy Management (MTM) Encounter    ASSESSMENT:                            Medication Adherence/Access: No issues identified.    Weight management :    Patient congratulated on >15% total body weight loss and lifestyle modifications.     Patient would benefit from continuing pharmacotherapy for weight management. Given tolerating well, class III obesity, recommend optimizing GLP1/GIP therapy as data to support most significant weight loss and patient has no contraindications. Patient also realizing benefit from reduction in food noise and increased satiety. Education provided on continued dietary and behavioral modifications. Given significant weight loss on current dose, will continue Wegovy 1.7 mg to optimize lifestyle modifications before increase to max dose. Tolerating well, so if [facility] PMAP requires dose increase due to qty limit, this is ok.        PLAN:                            Continue Wegovy 1.7 mg weekly. Keep doing great work to get your protein and water in each day.     As discussed, we will have you work on indoor activity to help keep your body moving in the cold weather (see exercises below or you may work to increase your step count).      Eat 3 meals with protein focus daily to help with nausea. If you forget to eat, you may feel nausea as a hunger cue.  Focus on getting protein in first with each meal and snack.   A good starting goal is 60 g protein daily (track this, especially if at weight loss plateau). Once you consistently are getting 60g daily, try getting 90 g protein daily.  Drink plenty of water - goal 64 oz throughout the day  You may try Metamucil, Benefiber, or Citrucel to help feel more full (less nausea) and have softer, more consistent bowel movements.  To optimize weight management - work on incorporating resistance training/weight lifting to build muscle and improve overall metabolism of adipose tissue.      Follow-up: 1/10/25 with Vicenta Ward PA-C  3/21/25  with Liliana Cervantes PharmD     SUBJECTIVE/OBJECTIVE:                          ROXANNA Marshall is a 24 year old female seen for a follow-up visit.       Reason for visit: Medication Therapy Management GLP1/GIP Agonist Management .    Allergies/ADRs: Reviewed in chart  Past Medical History: Reviewed in chart  Tobacco: She reports that she has never smoked. She has never used smokeless tobacco.  Alcohol: not currently using    Medication Adherence/Access: no issues reported.    Weight Management   Wegovy 1.7 mg once weekly x 2 months  Bupropion  mg daily (for mental health)  Vyvanse 40 mg daily (for mental health)    Return Medical Weight Management Visit 7/18/24 with Mary Mane CNP   New Medical Weight Managment Visit 5/21/24 with Vicenta Ward PA-C  Registered dietician visit with Pricilla Bass registered dietician 8/30/24    Patient reports no current medication side effects. Pleased with weight management and appetite improvements. Eating more nutritiously and intuitively - consistent through the day instead of one big meal that is difficult to manage hunger, therefore less over eating.    Nutrition/Eating Habits: Now eating more consistently throughout the day. THis has been big improvement from 1 meal per day. Focusing on protein first with meals and snacks and fruits, veggies, - less carbs.     water intake: 16-20 oz bottles x 3 per day  .    Exercise/Activity: bikes to work 5 days per week - when warm outside; walks on the weekends. Harder to do when cold out - very intolerant to cold. Walks a lot at work: 10-13 floors of stairs per step counter/watch, 0712-0243  steps per day.      Weight management history:    Per Initial visit notes with Vicenta Ward PA-C  Overweight onset at 17yo with start of Abilify. Previously weight stable around 140lbs and had no weight concerns. Since then weight gain has been gradual. Weight influenced by stress and mental health. Vaping cessation 1 bryanna ago, leading to increase  "hunger. Vyvanse controls hunger well during the day, leading to increase hunger at night. Able to lose 20lbs in December and has maintained around 10lbs of that weight loss. Is adopted, so minimal family hx. Wants to lose weight to improve overall health, feel better, and be more active.      Discussed AOMs to help with weight loss:   - Phentermine - contraindicated due to currently being on Vyvanse.   - Topiramate can be considered in the future with approval by psychiatrist. No hx of kidney disease or stones.   - Naltrexone can be considered in the future. No hx of liver disease. Not taking opioids.   - Wellbutrin - currently taking for mood. No side effects. No effect on weight.   - Metformin can be considered in the future. Concern for diarrhea side effect.   - GLP-1 to be started today. No hx of pancreatitis. No personal or known family hx of MTC or MENII. She has a hx of significant GERD that is moderately controlled on omeprazole 20mg BID. Symptoms improve when she does not have a snack at night and when she take her med at night. Symptoms also improve with weight loss. She will monitor symptoms very closely, may need to consider a change in PPI.        Per visit notes Mary Mane CNP    Big transition with eating 3 meals a day (was eating 1 per day) since moving to group home. Feeling good about change. Some nausea with 1mg wegovy so would recommend staying here for now. Discussed increase if needed prior to follow up.     Initial Weight (lbs): 266 lbs          Current weight today: 224 lbs 0 oz  Cumulative Weight Loss: -42 lb, -15.8% from baseline    Wt Readings from Last 4 Encounters:   11/27/24 224 lb (101.6 kg)   09/20/24 238 lb (108 kg)   08/30/24 243 lb 12.8 oz (110.6 kg)   08/09/24 246 lb 9.6 oz (111.9 kg)     Estimated body mass index is 40.29 kg/m  as calculated from the following:    Height as of 8/30/24: 5' 2.52\" (1.588 m).    Weight as of this encounter: 224 lb (101.6 kg).        Today's Vitals: " Wt 224 lb (101.6 kg)   BMI 40.29 kg/m    ----------------      I spent 10 minutes with this patient today. All changes were made via collaborative practice agreement with Vicenta Ward. A copy of the visit note was provided to the patient's provider(s).    A summary of these recommendations was sent via [employer].    Liliana Cervantes, Pharm D., MPH    Medication Therapy Management Pharmacist   [facility]      Telemedicine Visit Details  The patient's medications can be safely assessed via a telemedicine encounter.  Type of service:  Telephone visit  Originating Location (pt. Location): Home    Distant Location (provider location):  Off-site  Start Time:  2:50 PM  End Time:  3:00 PM     Medication Therapy Recommendations  Class 3 drug-induced obesity with serious comorbidity and body mass index (BMI) of 45.0 to 49.9 in adult (H)   1 Current Medication: Semaglutide-Weight Management (WEGOVY) 1.7 MG/0.75ML pen   Current Medication Sig: Inject 1.7 mg subcutaneously every 7 days.   Rationale: Medication product not available - Adherence - Adherence   Recommendation: Provide Adherence Intervention   Status: Accepted per CPA   Identified Date: 11/27/2024 Completed Date: 11/27/2024

## 2024-11-27 NOTE — Clinical Note
Z is doing FABULOUSLY with Wegovy! She's lost >15% total body weight and has completely revamped her nutrition!  Plan is to continue Wegovy 1.7 mg to optimize as long as insurance allows and then increase to Wegovy 2.4 mg given great results with Wegovy.  Seeing you in January, me in March

## 2025-01-20 ENCOUNTER — TELEPHONE (OUTPATIENT)
Dept: ENDOCRINOLOGY | Facility: CLINIC | Age: 25
End: 2025-01-20
Payer: COMMERCIAL

## 2025-01-20 NOTE — TELEPHONE ENCOUNTER
Left Voicemail (1st Attempt) and Sent Dindonghart (1st Attempt) for the patient to call back and schedule the following:    Appointment type: Return Weight Management  Appointment mode: In Person or Virtual Visit  Provider: Vicenta Ward PA-C  Return date: Approx. 6/10/25  Specialty phone number: [phone]    Additional Notes:

## 2025-01-23 ENCOUNTER — LAB (OUTPATIENT)
Dept: LAB | Facility: CLINIC | Age: 25
End: 2025-01-23
Payer: COMMERCIAL

## 2025-01-23 DIAGNOSIS — Z79.899 HIGH RISK MEDICATION USE: ICD-10-CM

## 2025-01-23 DIAGNOSIS — Z78.9 TAKES DIETARY SUPPLEMENTS: ICD-10-CM

## 2025-01-23 DIAGNOSIS — Z79.899 HIGH RISK MEDICATION USE: Primary | ICD-10-CM

## 2025-01-23 LAB
CHOLEST SERPL-MCNC: 175 MG/DL
EST. AVERAGE GLUCOSE BLD GHB EST-MCNC: 105 MG/DL
FASTING STATUS PATIENT QL REPORTED: NO
FASTING STATUS PATIENT QL REPORTED: NO
GLUCOSE SERPL-MCNC: 87 MG/DL (ref 70–99)
HBA1C MFR BLD: 5.3 % (ref 0–5.6)
HDLC SERPL-MCNC: 60 MG/DL
LDLC SERPL CALC-MCNC: 101 MG/DL
NONHDLC SERPL-MCNC: 115 MG/DL
TRIGL SERPL-MCNC: 68 MG/DL

## 2025-03-10 ENCOUNTER — TELEPHONE (OUTPATIENT)
Dept: FAMILY MEDICINE | Facility: CLINIC | Age: 25
End: 2025-03-10
Payer: COMMERCIAL

## 2025-03-10 NOTE — TELEPHONE ENCOUNTER
Forms/Letter Request    Type of form/letter: OTHER: PHYSICIAN ORDERS       Do we have the form/letter: Yes: FORM    Who is the form from? Patient    Where did/will the form come from? Patient or family brought in       When is form/letter needed by: [id number]    How would you like the form/letter returned:     Patient Notified form requests are processed in 5-7 business days:Yes    Could we send this information to you in [employer] or would you prefer to receive a phone call?:   Patient would prefer a phone call   Okay to leave a detailed message?: Yes at Cell number on file:    Telephone Information:   Mobile [phone]

## 2025-03-12 ENCOUNTER — MEDICAL CORRESPONDENCE (OUTPATIENT)
Dept: HEALTH INFORMATION MANAGEMENT | Facility: CLINIC | Age: 25
End: 2025-03-12

## 2025-03-13 NOTE — TELEPHONE ENCOUNTER
Forms/Letter     Verify patient name and date of birth.  Was this done?: Yes    Verify Photo ID needed of person  item.  Name of person picking up: Kerrie Cueva picked up form on 3/13/25 at 4:45 pm  Ewelina Ha

## 2025-03-17 ASSESSMENT — PATIENT HEALTH QUESTIONNAIRE - PHQ9
SUM OF ALL RESPONSES TO PHQ QUESTIONS 1-9: 7
10. IF YOU CHECKED OFF ANY PROBLEMS, HOW DIFFICULT HAVE THESE PROBLEMS MADE IT FOR YOU TO DO YOUR WORK, TAKE CARE OF THINGS AT HOME, OR GET ALONG WITH OTHER PEOPLE: SOMEWHAT DIFFICULT
SUM OF ALL RESPONSES TO PHQ QUESTIONS 1-9: 7

## 2025-03-18 ENCOUNTER — VIRTUAL VISIT (OUTPATIENT)
Dept: FAMILY MEDICINE | Facility: CLINIC | Age: 25
End: 2025-03-18
Payer: COMMERCIAL

## 2025-03-18 ENCOUNTER — TELEPHONE (OUTPATIENT)
Dept: ENDOCRINOLOGY | Facility: CLINIC | Age: 25
End: 2025-03-18

## 2025-03-18 DIAGNOSIS — K21.9 GASTROESOPHAGEAL REFLUX DISEASE WITHOUT ESOPHAGITIS: ICD-10-CM

## 2025-03-18 PROCEDURE — 98005 SYNCH AUDIO-VIDEO EST LOW 20: CPT | Performed by: NURSE PRACTITIONER

## 2025-03-18 RX ORDER — OMEPRAZOLE 20 MG/1
20 CAPSULE, DELAYED RELEASE ORAL EVERY OTHER DAY
Qty: 7 CAPSULE | Refills: 0 | Status: SHIPPED | OUTPATIENT
Start: 2025-04-03 | End: 2025-04-17

## 2025-03-18 RX ORDER — FAMOTIDINE 20 MG/1
20 TABLET, FILM COATED ORAL
Qty: 14 TABLET | Refills: 0 | Status: SHIPPED | OUTPATIENT
Start: 2025-03-19 | End: 2025-04-02

## 2025-03-18 RX ORDER — FAMOTIDINE 20 MG/1
20 TABLET, FILM COATED ORAL 2 TIMES DAILY
Qty: 180 TABLET | Refills: 2 | Status: SHIPPED | OUTPATIENT
Start: 2025-04-18

## 2025-03-18 RX ORDER — FAMOTIDINE 20 MG/1
TABLET, FILM COATED ORAL
Qty: 21 TABLET | Refills: 0 | Status: SHIPPED | OUTPATIENT
Start: 2025-04-03 | End: 2025-04-17

## 2025-03-18 RX ORDER — ESCITALOPRAM OXALATE 5 MG/1
5 TABLET ORAL DAILY
COMMUNITY

## 2025-03-18 RX ORDER — OMEPRAZOLE 20 MG/1
20 CAPSULE, DELAYED RELEASE ORAL DAILY
Qty: 14 CAPSULE | Refills: 0 | Status: SHIPPED | OUTPATIENT
Start: 2025-03-19 | End: 2025-04-02

## 2025-03-18 NOTE — PATIENT INSTRUCTIONS
Gastroesophageal reflux disease without esophagitis  Chronic history of GERD, currently well-controlled on omeprazole twice daily.  Patient would like to transition to famotidine due to long-term side effects.  Discussed with patient and group home staff.  Will transition to famotidine slowly to avoid rebound reflux.  Will adjust as follows:  ~The next 2 weeks we will take omeprazole in the morning and famotidine in the afternoon, then  ~Alternating days with omeprazole in the morning and famotidine in the afternoon as above and famotidine twice daily for 2 weeks, then  ~Famotidine twice daily  ~Advised if having any increased reflux during these dosage adjustments to go back to previous adjustment, notify provider and will do taper for a longer period of time.  - famotidine (PEPCID) 20 MG tablet; Take 1 tablet (20 mg) by mouth daily at 2 pm for 14 days. In combination with Omeprazole given in the morning  - omeprazole (PRILOSEC) 20 MG DR capsule; Take 1 capsule (20 mg) by mouth daily for 14 days. In combination with Famotidine in the afternoon;  - omeprazole (PRILOSEC) 20 MG DR capsule; Take 1 capsule (20 mg) by mouth every other day for 14 days. In the a.m. alternating days with Famotidine two times daily  - famotidine (PEPCID) 20 MG tablet; 20 mg at 2 pm every other day alternating with 20 mg two times daily every other day for 14 days  - famotidine (PEPCID) 20 MG tablet; Take 1 tablet (20 mg) by mouth 2 times daily.

## 2025-03-18 NOTE — TELEPHONE ENCOUNTER
"Received message that patient wanted to inquire about getting a prior authorization in place for \"surgery.\" She is currently working with [facility]. I did leave a  message asking her to call me to clarify. If she does want to pursue bariatric surgery rather than go the [facility] route, she will need to schedule a NBS appointment, the nutrition class and Summer's class. My direct call back number left.   "

## 2025-03-18 NOTE — NURSING NOTE
"Chief Complaint   Patient presents with    Weight Check       Initial There were no vitals taken for this visit. Estimated body mass index is 39.57 kg/m  as calculated from the following:    Height as of 1/10/25: 1.588 m (5' 2.52\").    Weight as of 1/10/25: 99.8 kg (220 lb).    Patient presents to the clinic using No DME    Is there anyone who you would like to be able to receive your results? No  If yes have patient fill out ELISABETH      "

## 2025-03-18 NOTE — NURSING NOTE
"No chief complaint on file.      Initial There were no vitals taken for this visit. Estimated body mass index is 39.57 kg/m  as calculated from the following:    Height as of 1/10/25: 1.588 m (5' 2.52\").    Weight as of 1/10/25: 99.8 kg (220 lb).    Patient presents to the clinic using No DME    Is there anyone who you would like to be able to receive your results? No  If yes have patient fill out ELISABETH      "

## 2025-03-20 ENCOUNTER — TELEPHONE (OUTPATIENT)
Dept: FAMILY MEDICINE | Facility: CLINIC | Age: 25
End: 2025-03-20

## 2025-03-20 NOTE — TELEPHONE ENCOUNTER
Candace at [facility] calling to request clarification of the omeprazole and famotidine titration that was ordered. Says that the directions are confusing for both the pt and their staff, and she's wondering if there's a different way that the orders starting on 4/3/25 can be worded. Routing to PCP for review.    Eleni Sanchez RN  [facility]

## 2025-03-21 NOTE — TELEPHONE ENCOUNTER
Call and ask staff if I did a Calendar for them if that would help?    Thanks,  Guerline Mccord, DNP

## 2025-03-21 NOTE — TELEPHONE ENCOUNTER
Left [facility] for staff to return call re: see message below from Guerline Mccord DNP.    Julie Behrendt RN

## 2025-03-24 ENCOUNTER — TELEPHONE (OUTPATIENT)
Dept: ENDOCRINOLOGY | Facility: CLINIC | Age: 25
End: 2025-03-24
Payer: COMMERCIAL

## 2025-03-24 NOTE — TELEPHONE ENCOUNTER
MTM appointment cancelled, we made one more attempt to reschedule.     Routing back to referring provider and MTM Pharmacist Team    [facility]

## 2025-03-24 NOTE — TELEPHONE ENCOUNTER
I spoke to [facility] pharmacy and they are wondering which to start with? Omeprazole or Famotidine?     They did think a calendar of when to give which medication would be very helpful and this can be faxed to them at:     Fax clarification to [facility] at [phone]      Claudine Byrne RN

## 2025-03-26 ENCOUNTER — TELEPHONE (OUTPATIENT)
Dept: FAMILY MEDICINE | Facility: CLINIC | Age: 25
End: 2025-03-26
Payer: COMMERCIAL

## 2025-03-27 NOTE — TELEPHONE ENCOUNTER
Can we please call the house and clarify what she is taking now and the dosages.  If she taking the omeprazole twice a day still and has she even started the famotidine?  Inquire on how many omeprazole she has left if she is taking it.  Then we can just adjust the calendar to start a different date.    Thanks,  Guerline Mccord, DNP

## 2025-03-27 NOTE — TELEPHONE ENCOUNTER
[facility] pharmacy, called stating that the calendar is not matching up the what patient should be taking.     She reports the titration begin 03/19-- pharmacy only provide enough for 14 days. Now group home is asking for additional dosing for prilosec. As the calendar is off from what patient should be taking.     From the original rx. Patient should be starting new dose on tues 4/2. Then next dose change would be 4/16.    Pharmacy phone #: [phone]     Calendar placed in yellow folder for review.         [facility]

## 2025-03-27 NOTE — TELEPHONE ENCOUNTER
"Patient is currently taking 1 Prilosec in the morning and Pepcid at night. She started the Pepcid right away when she got the first script on either the 20th or the 21st. So currently she is taking each 1 time daily.     She reports that the initial 14 days ends 4/2, and she reports starting 4/3 she would would the alternating \"20 mg at 2 pm every other day alternating with 20 mg two times daily every other day for 14 days \"    Can you please verifry that days she is taking the famotidine it should be taking TWICE daily - not taking it two days in a row. She (Kerrie at group home. did not understand the calendar and requests a more clear calendar be sent to [facility].     Please advise.   Emilee Beth RN on 3/27/2025 at 5:06 PM    "

## 2025-03-31 NOTE — TELEPHONE ENCOUNTER
Spoke with Kerrie and the house and advised note below. She verbalizes understanding.    Emilee Beth RN on 3/31/2025 at 3:00 PM

## 2025-03-31 NOTE — TELEPHONE ENCOUNTER
Please notify the house provider spoke with pharmacy and everything should be on schedule with delivering of medications to start patient's next taper of the omeprazole and famotidine.    Thanks,  Guerline Mccord, DNP

## 2025-04-28 NOTE — PROGRESS NOTES
Please abstract pap essentia 12/2022 with neg hpv; colonoscopy 5/2024 Regina is a 20 year old who is being evaluated via a billable telephone visit.      What phone number would you like to be contacted at? [phone], speak to foster mom.  How would you like to obtain your AVS? Bettiehart  Assessment & Plan     Stomach flu  - Reviewed the symptoms and relationship to the flu vaccine. She really looks like she has contracted more of a stomach virus. She is improving each day but then pushes it and will try to eat high fat spicy food and then vomit again, she is drinking well. Drinking lots of gatorade. The vomiting is not intractable only related to the food choices now.     - Plan to have her start a probiotic BID.   - Clear liquid diet for today. Then move to the brat diet for the next few days.   - Explained it could take up to a week to get the stomach tolerance back to baseline.   - They will call clinic if she does not continue to improve.       30 minutes spent on the date of the encounter doing chart review, patient visit, documentation and discussion with family     Jero Martinez NP  [facility]     Regina is a 20 year old who presents to clinic today for the following health issues {ACCOMPANIED BY her Foster Mother Erna.   HPI       Medication Followup of Flu Shot    Date of injection: 1/22/2021    Side Effects:  Vomiting, diarrhea, increased anxiety, fever    Treatments tried:  Gatorade     She is actually improving, only has an issue with spicy fatty food. This sounds much more like a stomach flu than flu shot reaction. No fevers, chills, headache, hives, swelling, or itching. She is drinking and eating fine as long as it is not a fast food taco etc. Does fine with toast, clear liquids, and bland foods.     Review of Systems   Constitutional, HEENT, cardiovascular, pulmonary, gi and gu systems are negative, except as otherwise noted.      Objective    Vitals - Patient Reported  Weight (Patient Reported): 114.3 kg (252  lb)        Physical Exam   alert and no distress  PSYCH: Alert and oriented times 3; coherent speech, normal   rate and volume, able to articulate logical thoughts, able   to abstract reason, no tangential thoughts, no hallucinations   or delusions  Her affect is normal  RESP: No cough, no audible wheezing, able to talk in full sentences  Remainder of exam unable to be completed due to telephone visits                Phone call duration: 20 minutes

## 2025-05-23 ENCOUNTER — VIRTUAL VISIT (OUTPATIENT)
Dept: ENDOCRINOLOGY | Facility: CLINIC | Age: 25
End: 2025-05-23
Payer: COMMERCIAL

## 2025-05-23 VITALS — BODY MASS INDEX: 38.09 KG/M2 | HEIGHT: 63 IN | WEIGHT: 215 LBS

## 2025-05-23 DIAGNOSIS — E66.813 CLASS 3 DRUG-INDUCED OBESITY WITH SERIOUS COMORBIDITY AND BODY MASS INDEX (BMI) OF 45.0 TO 49.9 IN ADULT (H): ICD-10-CM

## 2025-05-23 DIAGNOSIS — E66.1 CLASS 3 DRUG-INDUCED OBESITY WITH SERIOUS COMORBIDITY AND BODY MASS INDEX (BMI) OF 45.0 TO 49.9 IN ADULT (H): ICD-10-CM

## 2025-05-23 PROCEDURE — 1126F AMNT PAIN NOTED NONE PRSNT: CPT | Mod: 93

## 2025-05-23 PROCEDURE — 98012 SYNCH AUDIO-ONLY EST SF 10: CPT

## 2025-05-23 ASSESSMENT — PAIN SCALES - GENERAL: PAINLEVEL_OUTOF10: NO PAIN (0)

## 2025-05-23 NOTE — LETTER
2025       RE: Regina Marshall  [address] [facility] [address]     Dear Colleague,    Thank you for referring your patient, Regina Marshall, to the [facility] WEIGHT MANAGEMENT [facility]INIC [place] at [facility]. Please see a copy of my visit note below.      Originating Location (pt. Location): Home    Distant Location (provider location):  Off-site  Telephone visit completed due to the video connection was lost and majority of visit was completed via audio-only.  Phone call duration: 15 minutes    Return Medical Weight Management Note     Regina Marshall  MRN:  [id number]  :  2000  TRISTAN:  2025    Dear Guerline Mccord DNP,    I had the pleasure of seeing your patient Regina Marshall. She is a 25 year old female who I am continuing to see for treatment of obesity related to:        2024     3:48 PM   --   I have the following health issues associated with obesity None of the above   I have the following symptoms associated with obesity Knee Pain    Depression    Back Pain    Fatigue    Groin Rash    Hip Pain       Assessment & Plan  Problem List Items Addressed This Visit       Class 3 drug-induced obesity with serious comorbidity and body mass index (BMI) of 45.0 to 49.9 in adult (H)    Relevant Medications    Semaglutide-Weight Management (WEGOVY) 2.4 MG/0.75ML pen      Continue Wegovy 2.4mg once weekly. Refills sent   Follow up with Vicenta Thorne PA-C on 6/3/2025 to discuss bariatric surgery.       INTERVAL HISTORY:  New MWM on 2024. Overweight onset at 19yo with start of Abilify. Previously weight stable around 140lbs and had no weight concerns. Vyvanse controls hunger well during the day, leading to increase hunger at night. Able to lose 20lbs in 2023 and has maintained around 10lbs of that weight loss   Started Wegovy       She had reached out about bariatric surgery as she was hoping to lose more weight and  wondering if it would be an option. She has a goal weight of 180-200lb. Will schedule next appointment to discuss this fully.     Anti-obesity medication history    Current:   Wegovy 2.4mg  - is not having any side effects. Having nausea, the day after the injection. Feels like it is still helpful.     Recent diet changes: Eating 1 meal a day with 3-4 snacks. Is feeling hungry after meals. Having more snacks. Minimal water. 1 cup of coffee daily   B - skips   L - skips if work is too busy, sandwich   D - whatever they make at the house.   S- chips, cookie     Recent exercise/activity changes: biking, hiking     Recent stressors: work has been going well. Is not liking house she is currently at.     CURRENT WEIGHT:   215 lbs 0 oz    Initial Weight (lbs): 266 lbs     Cumulative weight loss (lbs): 51  Weight Loss Percentage: 19.17%    Wt Readings from Last 5 Encounters:   05/23/25 97.5 kg (215 lb)   01/10/25 99.8 kg (220 lb)   11/29/24 100.1 kg (220 lb 9.6 oz)   11/27/24 101.6 kg (224 lb)   09/20/24 108 kg (238 lb)             5/22/2025     9:15 AM   Changes and Difficulties   I have made the following changes to my diet since my last visit: Eating less and more protien   With regards to my diet, I am still struggling with: Sugar   I have made the following changes to my activity/exercise since my last visit: Walking more and biking too   With regards to my activity/exercise, I am still struggling with: Consistency         MEDICATIONS:   Current Outpatient Medications   Medication Sig Dispense Refill     Semaglutide-Weight Management (WEGOVY) 2.4 MG/0.75ML pen Inject 2.4 mg subcutaneously once a week. 3 mL 2     ARIPiprazole (ABILIFY) 15 MG tablet Take 15 mg by mouth daily       buPROPion (WELLBUTRIN XL) 150 MG 24 hr tablet TAKE ONE TABLET BY MOUTH EVERY MORNING WITH 300MG TABLET TO EQUAL 450MG DOSE       buPROPion (WELLBUTRIN XL) 300 MG 24 hr tablet Take 300 mg by mouth every morning       cetirizine (ZYRTEC) 10 MG  "tablet Take 1 tablet (10 mg) by mouth daily 90 tablet 3     escitalopram (LEXAPRO) 5 MG tablet Take 5 mg by mouth daily.       famotidine (PEPCID) 20 MG tablet Take 1 tablet (20 mg) by mouth 2 times daily. 180 tablet 2     hydrOXYzine (ATARAX) 50 MG tablet Take 1 tablet (50 mg) by mouth 2 times daily as needed       ibuprofen (ADVIL/MOTRIN) 200 MG tablet Take 4 tablets (800 mg) by mouth every 8 hours as needed for pain (menstual)       levonorgestrel (KYLEENA) 19.5 MG IUD 1 each by Intrauterine route once       lisdexamfetamine (VYVANSE) 40 MG capsule Take 40 mg by mouth every morning       mirtazapine (REMERON) 15 MG tablet Take 22.5 mg by mouth at bedtime.       mirtazapine (REMERON) 7.5 MG tablet Take 7.5 mg by mouth at bedtime.       ondansetron (ZOFRAN) 8 MG tablet Take 1 tablet (8 mg) by mouth every 8 hours as needed for nausea 30 tablet 1     sertraline (ZOLOFT) 100 MG tablet Take 100 mg by mouth daily Takes 2 tablet daily. 200mg.       SODIUM FLUORIDE 5000 PPM 1.1 % PSTE dental paste Apply 0.5 g to affected area daily.             5/22/2025     9:15 AM   Weight Loss Medication History Reviewed With Patient   Which weight loss medications are you currently taking on a regular basis? Contrave (naltrexone/bupropion)    Vyvanse    Wegovy   Are you having any side effects from the weight loss medication that we have prescribed you? No         Objective   Ht 1.6 m (5' 3\")   Wt 97.5 kg (215 lb)   BMI 38.09 kg/m           Vitals:  No vitals were obtained today due to virtual visit.      PHYSICAL EXAM:  GENERAL: alert and no distress  EYES: Eyes grossly normal to inspection.  No discharge or erythema, or obvious scleral/conjunctival abnormalities.  RESP: No audible wheeze, cough, or visible cyanosis.    SKIN: Visible skin clear. No significant rash, abnormal pigmentation or lesions.  NEURO: Cranial nerves grossly intact.  Mentation and speech appropriate for age.  PSYCH: Appropriate affect, tone, and pace of " words        Sincerely,    Vicenta Ward PA-C      17 minutes spent by me on the date of the encounter doing chart review, history and exam, documentation and further activities per the note    The longitudinal plan of care for the diagnosis(es)/condition(s) as documented were addressed during this visit. Due to the added complexity in care, I will continue to support Z in the subsequent management and with ongoing continuity of care.    Again, thank you for allowing me to participate in the care of your patient.      Sincerely,    Vicenta Ward PA-C

## 2025-05-23 NOTE — NURSING NOTE
Current patient location: bus    Is the patient currently in the state of [place]? YES    Visit mode: VIDEO    If the visit is dropped, the patient can be reconnected by:VIDEO VISIT: Text to cell phone:   Telephone Information:   Mobile [phone]       Will anyone else be joining the visit? NO  (If patient encounters technical issues they should call [phone]    Are changes needed to the allergy or medication list? Pt stated no changes to allergies and Pt stated no med changes    Are refills needed on medications prescribed by this physician? Discuss with provider    Rooming Documentation:  Questionnaire(s) completed      Reason for visit: RECHECK    Wt other than 24 hrs:    Pain more than one location:  no  Rosey EDWARD

## 2025-05-23 NOTE — PROGRESS NOTES
Originating Location (pt. Location): Home    Distant Location (provider location):  Off-site  Telephone visit completed due to the video connection was lost and majority of visit was completed via audio-only.  Phone call duration: 15 minutes    Return Medical Weight Management Note     Regina Marshall  MRN:  [id number]  :  2000  TRISTAN:  2025    Dear Guerline Mccord, LATOSHA,    I had the pleasure of seeing your patient Regina Marshall. She is a 25 year old female who I am continuing to see for treatment of obesity related to:        2024     3:48 PM   --   I have the following health issues associated with obesity None of the above   I have the following symptoms associated with obesity Knee Pain    Depression    Back Pain    Fatigue    Groin Rash    Hip Pain       Assessment & Plan   Problem List Items Addressed This Visit       Class 3 drug-induced obesity with serious comorbidity and body mass index (BMI) of 45.0 to 49.9 in adult (H)    Relevant Medications    Semaglutide-Weight Management (WEGOVY) 2.4 MG/0.75ML pen      Continue Wegovy 2.4mg once weekly. Refills sent   Follow up with Vicenta Thorne PA-C on 6/3/2025 to discuss bariatric surgery.       INTERVAL HISTORY:  New MWM on 2024. Overweight onset at 17yo with start of Abilify. Previously weight stable around 140lbs and had no weight concerns. Vyvanse controls hunger well during the day, leading to increase hunger at night. Able to lose 20lbs in 2023 and has maintained around 10lbs of that weight loss   Started Wegovy       She had reached out about bariatric surgery as she was hoping to lose more weight and wondering if it would be an option. She has a goal weight of 180-200lb. Will schedule next appointment to discuss this fully.     Anti-obesity medication history    Current:   Wegovy 2.4mg  - is not having any side effects. Having nausea, the day after the injection. Feels like it is still helpful.     Recent diet changes: Eating  1 meal a day with 3-4 snacks. Is feeling hungry after meals. Having more snacks. Minimal water. 1 cup of coffee daily   B - skips   L - skips if work is too busy, sandwich   D - whatever they make at the house.   S- chips, cookie     Recent exercise/activity changes: biking, hiking     Recent stressors: work has been going well. Is not liking house she is currently at.     CURRENT WEIGHT:   215 lbs 0 oz    Initial Weight (lbs): 266 lbs     Cumulative weight loss (lbs): 51  Weight Loss Percentage: 19.17%    Wt Readings from Last 5 Encounters:   05/23/25 97.5 kg (215 lb)   01/10/25 99.8 kg (220 lb)   11/29/24 100.1 kg (220 lb 9.6 oz)   11/27/24 101.6 kg (224 lb)   09/20/24 108 kg (238 lb)             5/22/2025     9:15 AM   Changes and Difficulties   I have made the following changes to my diet since my last visit: Eating less and more protien   With regards to my diet, I am still struggling with: Sugar   I have made the following changes to my activity/exercise since my last visit: Walking more and biking too   With regards to my activity/exercise, I am still struggling with: Consistency         MEDICATIONS:   Current Outpatient Medications   Medication Sig Dispense Refill    Semaglutide-Weight Management (WEGOVY) 2.4 MG/0.75ML pen Inject 2.4 mg subcutaneously once a week. 3 mL 2    ARIPiprazole (ABILIFY) 15 MG tablet Take 15 mg by mouth daily      buPROPion (WELLBUTRIN XL) 150 MG 24 hr tablet TAKE ONE TABLET BY MOUTH EVERY MORNING WITH 300MG TABLET TO EQUAL 450MG DOSE      buPROPion (WELLBUTRIN XL) 300 MG 24 hr tablet Take 300 mg by mouth every morning      cetirizine (ZYRTEC) 10 MG tablet Take 1 tablet (10 mg) by mouth daily 90 tablet 3    escitalopram (LEXAPRO) 5 MG tablet Take 5 mg by mouth daily.      famotidine (PEPCID) 20 MG tablet Take 1 tablet (20 mg) by mouth 2 times daily. 180 tablet 2    hydrOXYzine (ATARAX) 50 MG tablet Take 1 tablet (50 mg) by mouth 2 times daily as needed      ibuprofen (ADVIL/MOTRIN)  "200 MG tablet Take 4 tablets (800 mg) by mouth every 8 hours as needed for pain (menstual)      levonorgestrel (KYLEENA) 19.5 MG IUD 1 each by Intrauterine route once      lisdexamfetamine (VYVANSE) 40 MG capsule Take 40 mg by mouth every morning      mirtazapine (REMERON) 15 MG tablet Take 22.5 mg by mouth at bedtime.      mirtazapine (REMERON) 7.5 MG tablet Take 7.5 mg by mouth at bedtime.      ondansetron (ZOFRAN) 8 MG tablet Take 1 tablet (8 mg) by mouth every 8 hours as needed for nausea 30 tablet 1    sertraline (ZOLOFT) 100 MG tablet Take 100 mg by mouth daily Takes 2 tablet daily. 200mg.      SODIUM FLUORIDE 5000 PPM 1.1 % PSTE dental paste Apply 0.5 g to affected area daily.             5/22/2025     9:15 AM   Weight Loss Medication History Reviewed With Patient   Which weight loss medications are you currently taking on a regular basis? Contrave (naltrexone/bupropion)    Vyvanse    Wegovy   Are you having any side effects from the weight loss medication that we have prescribed you? No         Objective    Ht 1.6 m (5' 3\")   Wt 97.5 kg (215 lb)   BMI 38.09 kg/m           Vitals:  No vitals were obtained today due to virtual visit.      PHYSICAL EXAM:  GENERAL: alert and no distress  EYES: Eyes grossly normal to inspection.  No discharge or erythema, or obvious scleral/conjunctival abnormalities.  RESP: No audible wheeze, cough, or visible cyanosis.    SKIN: Visible skin clear. No significant rash, abnormal pigmentation or lesions.  NEURO: Cranial nerves grossly intact.  Mentation and speech appropriate for age.  PSYCH: Appropriate affect, tone, and pace of words        Sincerely,    Vicenta Ward PA-C      17 minutes spent by me on the date of the encounter doing chart review, history and exam, documentation and further activities per the note    The longitudinal plan of care for the diagnosis(es)/condition(s) as documented were addressed during this visit. Due to the added complexity in care, I will " continue to support Z in the subsequent management and with ongoing continuity of care.

## 2025-05-23 NOTE — PROGRESS NOTES
"Virtual Visit Details    Type of service:  Video Visit   Video Start Time: {video visit start/end time for provider to select:655559}  Video End Time:{video visit start/end time for provider to select:816466}    Originating Location (pt. Location): {video visit patient location:560517::\"Home\"}  {PROVIDER LOCATION On-site should be selected for visits conducted from your clinic location or adjoining [facility] hospital, academic office, or other nearby [facility] building. Off-site should be selected for all other provider locations, including home:735147}  Distant Location (provider location):  {virtual location provider:807593}  Platform used for Video Visit: {Virtual Visit Platforms:137767::\"PredictSpring\"}    " Patient with one or more new problems requiring additional work-up/treatment.

## 2025-05-27 NOTE — PATIENT INSTRUCTIONS
Visit Plan:     Continue Wegovy 2.4mg once weekly. Refills sent   Follow up with Vicenta Thorne PA-C on 6/3/2025 to discuss bariatric surgery.

## 2025-05-30 ASSESSMENT — SLEEP AND FATIGUE QUESTIONNAIRES
HOW LIKELY ARE YOU TO NOD OFF OR FALL ASLEEP WHILE LYING DOWN TO REST IN THE AFTERNOON WHEN CIRCUMSTANCES PERMIT: MODERATE CHANCE OF DOZING
HOW LIKELY ARE YOU TO NOD OFF OR FALL ASLEEP WHILE SITTING QUIETLY AFTER LUNCH WITHOUT ALCOHOL: MODERATE CHANCE OF DOZING
HOW LIKELY ARE YOU TO NOD OFF OR FALL ASLEEP WHILE SITTING INACTIVE IN A PUBLIC PLACE: MODERATE CHANCE OF DOZING
HOW LIKELY ARE YOU TO NOD OFF OR FALL ASLEEP WHEN YOU ARE A PASSENGER IN A CAR FOR AN HOUR WITHOUT A BREAK: HIGH CHANCE OF DOZING
HOW LIKELY ARE YOU TO NOD OFF OR FALL ASLEEP WHILE SITTING AND READING: MODERATE CHANCE OF DOZING
HOW LIKELY ARE YOU TO NOD OFF OR FALL ASLEEP IN A CAR, WHILE STOPPED FOR A FEW MINUTES IN TRAFFIC: WOULD NEVER DOZE
HOW LIKELY ARE YOU TO NOD OFF OR FALL ASLEEP WHILE SITTING AND TALKING TO SOMEONE: SLIGHT CHANCE OF DOZING
HOW LIKELY ARE YOU TO NOD OFF OR FALL ASLEEP WHILE WATCHING TV: MODERATE CHANCE OF DOZING

## 2025-06-03 ENCOUNTER — VIRTUAL VISIT (OUTPATIENT)
Dept: ENDOCRINOLOGY | Facility: CLINIC | Age: 25
End: 2025-06-03
Payer: COMMERCIAL

## 2025-06-03 VITALS — WEIGHT: 213 LBS | HEIGHT: 63 IN | BODY MASS INDEX: 37.74 KG/M2

## 2025-06-03 DIAGNOSIS — E66.1 CLASS 3 DRUG-INDUCED OBESITY WITH SERIOUS COMORBIDITY AND BODY MASS INDEX (BMI) OF 45.0 TO 49.9 IN ADULT (H): Primary | ICD-10-CM

## 2025-06-03 DIAGNOSIS — F90.9 ATTENTION DEFICIT HYPERACTIVITY DISORDER (ADHD), UNSPECIFIED ADHD TYPE: ICD-10-CM

## 2025-06-03 DIAGNOSIS — K21.9 GASTROESOPHAGEAL REFLUX DISEASE, UNSPECIFIED WHETHER ESOPHAGITIS PRESENT: ICD-10-CM

## 2025-06-03 DIAGNOSIS — F41.1 GAD (GENERALIZED ANXIETY DISORDER): ICD-10-CM

## 2025-06-03 DIAGNOSIS — F43.10 PTSD (POST-TRAUMATIC STRESS DISORDER): ICD-10-CM

## 2025-06-03 DIAGNOSIS — E66.813 CLASS 3 DRUG-INDUCED OBESITY WITH SERIOUS COMORBIDITY AND BODY MASS INDEX (BMI) OF 45.0 TO 49.9 IN ADULT (H): Primary | ICD-10-CM

## 2025-06-03 DIAGNOSIS — F33.41 RECURRENT MAJOR DEPRESSIVE DISORDER, IN PARTIAL REMISSION: ICD-10-CM

## 2025-06-03 PROBLEM — R11.2 NAUSEA AND VOMITING: Status: RESOLVED | Noted: 2021-04-19 | Resolved: 2025-06-03

## 2025-06-03 PROBLEM — M26.623 BILATERAL TEMPOROMANDIBULAR JOINT PAIN: Status: ACTIVE | Noted: 2025-03-02

## 2025-06-03 PROCEDURE — 1126F AMNT PAIN NOTED NONE PRSNT: CPT | Mod: 95

## 2025-06-03 PROCEDURE — 98007 SYNCH AUDIO-VIDEO EST HI 40: CPT

## 2025-06-03 RX ORDER — ESCITALOPRAM OXALATE 10 MG/1
10 TABLET ORAL DAILY
COMMUNITY
Start: 2025-05-01

## 2025-06-03 RX ORDER — ARIPIPRAZOLE 20 MG/1
20 TABLET ORAL DAILY
COMMUNITY
Start: 2025-05-26

## 2025-06-03 ASSESSMENT — PAIN SCALES - GENERAL: PAINLEVEL_OUTOF10: NO PAIN (0)

## 2025-06-03 NOTE — NURSING NOTE
Current patient location: Pt is at work in [place]    Is the patient currently in the state of [place]? YES    Visit mode: VIDEO    If the visit is dropped, the patient can be reconnected by:VIDEO VISIT: Text to cell phone:   Telephone Information:   Mobile [phone]       Will anyone else be joining the visit? NO  (If patient encounters technical issues they should call [phone])    Are changes needed to the allergy or medication list? No    Are refills needed on medications prescribed by this physician? Discuss with provider    Rooming Documentation:  Questionnaire(s) completed    Reason for visit: Consult    Emigdio EDWARD

## 2025-06-03 NOTE — PROGRESS NOTES
"Virtual Visit Details    Type of service:  Video Visit   Video Start Time: {video visit start/end time for provider to select:251738}  Video End Time:{video visit start/end time for provider to select:876429}    Originating Location (pt. Location): {video visit patient location:477025::\"Home\"}  {PROVIDER LOCATION On-site should be selected for visits conducted from your clinic location or adjoining [facility] hospital, academic office, or other nearby [facility] building. Off-site should be selected for all other provider locations, including home:982885}  Distant Location (provider location):  {virtual location provider:455871}  Platform used for Video Visit: {Virtual Visit Platforms:508207::\"Teamly\"}    "

## 2025-06-03 NOTE — ASSESSMENT & PLAN NOTE
First seen for New MWM on 5/21/2024. Overweight onset at 19yo with start of Abilify. Previously weight stable around 140lbs and had no weight concerns. Since then weight gain has been gradual. Max weight in life of 286lb. At our first visit started on Wegovy. Since then has lost 57lb. Her weight is currently stable. She would like to now consider bariatric surgery to help with continued and sustained long term weight loss.     Comorbidities include GERD, IBS, anxiety, depression, ADHD, and PTSD.     Will be using starting weight of 270lb (BMI 46.32) from 5/21/2024.     She will continue on Wegovy today. Sabinorefugio discussed transitioning to Zepbound. She is concerned about side effects of nausea with transition, as she would always get nausea with dose increase. Will continue on Wegovy at this time.

## 2025-06-03 NOTE — Clinical Note
MARILUZ, chele, Dr. Bright. She has been MWM since 5/2024 and now switching to NBS.   EGD ordered due to hx of GERD.

## 2025-06-03 NOTE — PATIENT INSTRUCTIONS
Ernesto MCKNIGHT,     Your visit was with Vicenta Ward PA-C today.    Instructions per today's visit:     Follow up  plan:  If you have not already watched our online seminar please go to [email or web address]     Weight loss requirement: 10lbs prior to surgery. Goal Weight: 260. Will have final weight check 2-3 weeks prior to surgery at anesthesia or nurse pre-op teaching visit.    -Need current weight confirmation at primary clinic or weight management clinic No    EGD ordered for hx of acid reflux. Call the Endoscopy Department at [phone] to schedule an endoscopy to be done by Dr. Mina Bright only     Bariatric labs ordered, call for a lab only appointment at any [facility] lab. To find a lab location near you, please call [phone]. Please let us know if orders need to be faxed to a non [facility] lab.    Schedule bariatric psych eval as soon as possible.  List of psychologists will be sent to you via [facility] or given to you in clinic.     Call Jero Lott at [phone] to discuss insurance coverage for bariatric surgery.  Please check with your insurance regarding bariatric surgery coverage also. Jero can also help you with scheduling psych eval if you are having difficulties.    The following clearance letters are needed: Letter templates will be sent to you via [employer] or given to you in clinic. Providers can submit through electronic medical record or fax to [phone].  - Primary care provider.   - Psychiatrist     Smoking cessation and nicotine test needed: Yes  Reach out with your quit date, and we will order a nicotine urine 1 month after.     Birth control after surgery discussed. Patient instructed that 2 forms of birth control required after surgery and to avoid pregnancy for at least 18 months after surgery: IUD    NEXT VISITS: A  should reach out to you to schedule the following appointments.  If they do not reach you please call [phone] to schedule the  "following appointments:    -See dietitian in 1 month and monthly for 3 months    -See MTM pharmacist in 2 months to follow up on weight loss medications and polypharmacy     -See Vicenta Thorne in 3 months to follow up on pre-op weight loss and weight loss medications    -See Dr Bright in 3 month for bariatric surgeon visit. Discuss bariatric surgery.  Important items to discuss EGD      ___________________________________________________________________________  Important contact and scheduling information:    Please call our contact center at [phone] to schedule your next appointments.  For any nursing questions or concerns call Nicole Medina LPN at [phone], Summer Lucas RN at [phone], or Cheri Coleman RN at [phone]    Please call during clinic hours Monday through Friday 8:00a - 4:00p if you have questions or you can contact us via Well Done at anytime and we will reply within 24-48 hours during clinic hours.    __________________________________________________________________________  If labs were ordered today:    Please make an appointment to have them drawn at your convenience.     To schedule the Lab Appointment using Well Done:  Select \"Schedule an Appointment\"  Select \"Lab Only\"  For \"A couple of questions\", select \"Other\"  For \"Which locations work for you?, select the location and set up the appointment    To schedule by phone call [phone] to schedule a lab only appointment at [facility].    Lab results will be communicated through My Chart or letter (if My Chart not used). Please call the clinic if you have not received communication after 1 week or if you have any questions.?  Clinic Fax: [phone]  ___________________________________________________________________________  Work with A [facility] !  Virtual Sessions are Available through [facility] Weight Management Clinics    To learn more, call to schedule an appointment: [phone]     What is Health " Coaching?  Do you know what you are supposed to do, but you just aren't doing it?  Then, HEALTH COACHING may help you!   Get unstuck and move forward with the support of a professionally trained NB-HWC (National Board-Certified Health and ) who uses evidence-based approaches to help you move forward with healthy lifestyle changes in the areas of weight loss, stress management and overall well-being.    Health Coaches help you identify goals that will work best for you. Health Coaches provide support and encouragement with overcoming barriers and help you to find inspiration and motivation to lead a healthy lifestyle.    Health Coaching 3-Pack; Three, 30-minute Health Coaching Visits, for $135  Health Coaching 6-Pack; Six, 30-minute Health coaching visits, for $250  Visits are done virtually (phone or video)  This is a self pay service; we do not accept insurance for berny coaching.    ____________________________________________________________________  [facility]  Healthy Lifestyle Group    Healthy Lifestyle Group  This is a 60 minute virtual coaching group for those who want to lead a healthier lifestyle. Come together to set goals and overcome barriers in a supportive group environment. We will address the four pillars of health--nutrition, exercise, sleep and emotional well-being.  This group is highly recommended for those who are participating in the 24 week Healthy Lifestyle Plan and our Health Coaching sessions.    WHEN: This group meets the first Friday of the month, 12:30 PM - 1:30 PM online, via a zoom meeting.      FACILITATOR: Led by National Board Certified Health and , Pam Miles, [facility].    TO REGISTER: Please call the Call Center at [phone] to register. [place] Fifteen minutes prior to the meeting, complete the e-check in and you will get the link to join the meeting.  There is  no charge to attend this group and space is limited.        If you would like bariatric surgery specific support group info please let your care team know.         Thank you,   [facility] Comprehensive Weight Management Team
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
## 2025-06-03 NOTE — ASSESSMENT & PLAN NOTE
Diagnosed May 2024 after ER visit with epigastric pain, nausea and vomiting. Was on Omeprazole 20mg BID. Has improved with weight loss and changes in food. She currently has no symptoms. Well controlled on Pepcid 20mg BID. Will get EGD prior to surgery for further work up.

## 2025-06-04 ENCOUNTER — PATIENT OUTREACH (OUTPATIENT)
Dept: CARE COORDINATION | Facility: CLINIC | Age: 25
End: 2025-06-04
Payer: COMMERCIAL

## 2025-06-05 ENCOUNTER — CARE COORDINATION (OUTPATIENT)
Dept: ENDOCRINOLOGY | Facility: CLINIC | Age: 25
End: 2025-06-05
Payer: COMMERCIAL

## 2025-06-05 NOTE — LETTER
2025   Re: Regina Marshall   Address: [id number] [place] [id number]   : 2000       Dear Mental Health Provider,     Thank you for taking the time to see Regina for a preoperative psychological evaluation for bariatric surgery. The following elements are required by our program for screening patients for bariatric surgery:     1. Intake Assessment   Identifying data: reason for pursuing surgery.   History of Present Illness: weight loss history, typical eating patterns, exercise, and leisure time activities, coping skills.   Psychiatric History: If a mental health condition is present, documented verification is required noting that the condition is it under successful treatment.   Medical History: allergies, emergencies, head traumas, etc.   Family History   Social and Development History: abuse/neglect, level of functioning, education, employment.   Substance Abuse and Addictions: prescription and OTC meds/herbs, alcohol and related substances, nicotine, caffeine/pop, gambling, shopping. Absence of active substance use disorder.   Mental Status: appearance, behavior, mood, and affect, thought content and thought process. Is patient able to provide an informed consent?   Social Network: marriage, children, friends, current living situation. Family and social supports have been assessed, and strategies to strengthen those supports have been recommended.   Legal History       2. MMPI   3. Summary of current findings: emotional stability, conclusions, and recommendations.   4. Follow-up visit to discuss the results of the MMPI or other tests, conclusions, and recommendations.     The evaluation must confirm that the patient is emotionally stable to proceed with the surgery, cognitively capable of understanding the risks and realistic goals of the surgical procedure and prepared to comply with the long-term aftercare and behavioral changes expected after surgery.     We feel it is  important for our patients to establish a relationship with a mental health provider prior to surgery since the changes they experience postoperatively can be overwhelming. Therefore, having an established relationship is essential to help them cope effectively with these life-altering changes.     If you have any questions, feel free to contact us via our [employer] [facility] at [phone]. Please fax the evaluation to the number below.     Sincerely,     Comprehensive Weight Management Team     [facility]   [facility]   [address], [address], [place], [place], [address]     Ph: [phone]   Fax: [phone]

## 2025-06-05 NOTE — LETTER
"    2025   Re: Regina Marshall   Address: [address] [address]   : 2000       Dear Primary Care Provider,     Thank you for coordinating with us to evaluate Regina for possible bariatric surgery.     It is important to us that the primary care provider is aware of their patients' desire to have weight loss surgery and is supportive of the patient having surgery. If the patient meets criteria and clearances for surgery, we will submit to the insurance company for insurance approval and coordinate the needed appointments with our department. If you have any questions, feel free to contact us via our [employer] [facility] at [phone]. The letter of support can be faxed to the number below or routed via [facility] [employer] to our team.       Sample letter:     \"Dear Weight Loss Surgery Clinic,     I am the primary care provider for (patient name) and I support (him/her/they) having weight loss surgery.     Sincerely,     (provider name)\"     Sincerely,     Comprehensive Weight Management Team     [facility]   [facility]   [address], [place], [place], [address]     Ph: [phone]   Fax: [phone]       "

## 2025-06-05 NOTE — PROGRESS NOTES
Bariatric Task List updated.  Bariatric information/clearance letters sent to patient via [employer].  Bariatric Task List    Fax:  Please fax all paperwork to: [phone] -      Status:   Is patient a candidate for bariatric surgery?:    -    Cleared to schedule surgeon consult?:    -    Status:    -    Surgeon: Dr. Bright -    Tentative surgery month/year: TBD -      Insurance:  Insurance:  [facility] -    Contact insurance to discuss coverage: Needed -      [employer]: PCP Recommendation and Medical Clearance:    -    HP Referral:    -    Advanced beneficiary notification (ABN) for Medicare patients for RD visits   and surgery:   -    Weight history:   -    Other:    -      Patient Info:  Initial Weight:  270 -    Date of Initial Weight/Height:  5/31/2024 - Initial NMWM visit with Vicenta Ward PA-C  Goal Weight (lbs):  260 -    Required Weight Loss:  10lb -    Surgery Type:  sleeve gastrectomy -    Multidisciplinary Meeting:    -      Dietician Visits:  Structured weight loss required by insurance?:    -    Dietician Visit 1:  Needed -    Dietician Visit 2:  Needed -    Dietician Visit 3:  Needed -    Dietician Visit 4:    -    Dietician Visit 5:    -    Dietician Visit 6:    -    Dietician Visit additional:  Needed - Monthly until surgery - AS  Clearance from dietician to see surgeon?:    -    Dietician Notes:  Past RD Visits: 7/2/24, 7/31/24, 8/30/24 -      Psychological Evaluation:  Lab Work:  Complete Blood Count:  Needed - Ordered 6/3/25 - AS  Comprehensive Metabolic Panel:  Needed - Ordered 6/3/25 - AS  Vitamin D:  Needed - Ordered 6/3/25 - AS  PTH:  Needed - Ordered 6/3/25 - AS  Hgb A1c:  Needed - Ordered 6/3/25 - AS   Lipids: Needed - Ordered 6/3/25 - AS   TSH ([facility], [facility], [place]): Needed - Ordered 6/3/25 - AS    Ferritin:   -      Folate:   -      Testosterone, Total and Free:   -    Thiamine:   -    Vitamin A: Needed - Ordered 6/3/25 - AS  Vitamin B12: Needed - Ordered 6/3/25 - AS  Zinc:   -    C-peptide:   -   "  H. pylori:    -    MRSA (2 swabs, minimum 48 hours apart):   -    Nicotine Testing:    -    Recheck Vitamin D:   -    Other:    -      Consults/ Clearance  Sleep Medicine:    -    Cardiac:    -    Pain:   -    Dental:    -    Endocrine:    -    Gastroenterology:    -    Vascular Medicine:    -    Hematology:    -    Medical Weight Management:   - Data deleted  Physical Therapy/Exercise:    -    Nephrology:    -    Neurology:    -    Pulmonology:    -    Rheumatology:    -    Other:    -    Other:    -    Other:    -      Testing:  UGI:    -    EGD:  Needed - hx of GERD  Sleep Study:   -    Other:   -    Other:    -      PCP:  Establish care with PCP:  Completed -    Follow up with PCP:    -    PCP letter of support:  Needed - Letter sent to pt 6/5/25 - AS    Health Maintenance:  Colonoscopy(> 50 yrs or family hx):    -    Mammogram (> 40 yrs or family hx):    -    Pap Smear (women):   -    Other:   -    Other:    -      Stopping Smoking/ Alcohol Use/Cannabis Use:  Quit tobacco use (3 months smoke free)?:  Needed -    Quit date:    -    Quit alcohol use:   -    Quit date:   -    Other:   -    Quit date:   -      Patient Education:   Information Session:  Needed -    Attended New Consult Class?: Needed -    Given \"Making your decision\" handout?:  Yes -    Given \"A Roadmap to you Weight Loss Surgery\" handout?: Yes -    Given \"Epic Care Companion\" information?: Yes -    Attended support group?:  Needed -    Support plan in place?:  Needed -    Research consents signed?:    -    Avoid NSAIDS/ Alternate Plan for Pain:   -      Additional Surgery Requirements:  Final Tasks:   Before surgery online preop class:  Needed -    After surgery online class:  Needed -    Nurse visit for information:  Needed -    Weight Check: Needed -    History and Physical: Pre-Assessment Clinic (PAC) -   Needed -    Final labs per clinic: Needed - Ordered at PAC visit. bks  See MTM Pharmacist for medication review and plan for after surgery " (if DM, transplant hx, greater than 10 meds): Needed -    Chest xray per clinic: Needed -    Electrocardiogram (ECG) per clinic:   -    Schedule postop appointments: Needed -    Other: See ANGEL and MD within 6 months of OR date. - Needed -      Notes:    -

## 2025-06-11 ENCOUNTER — TELEPHONE (OUTPATIENT)
Dept: ENDOCRINOLOGY | Facility: CLINIC | Age: 25
End: 2025-06-11
Payer: COMMERCIAL

## 2025-06-11 NOTE — TELEPHONE ENCOUNTER
Called [facility] to check if policy has bariatric surgery coverage, it does.    There are no Wet Read(s) to document. There is 1 Wet Read(s) to document.

## 2025-06-12 ENCOUNTER — TELEPHONE (OUTPATIENT)
Dept: ENDOCRINOLOGY | Facility: CLINIC | Age: 25
End: 2025-06-12
Payer: COMMERCIAL

## 2025-06-12 NOTE — TELEPHONE ENCOUNTER
New andra dietitian group visit and then 1 month after 1:1 visit with dietitian   New andra group nurse visit   Dr. Bright of [facility] meet and juve in 3 months   Liliana Cervantes RPH in 2 months   Vicenta Thorne in 3 months for pre surg - ok to put in open spot     Left Voicemail (1st Attempt) and Sent Mychart (1st Attempt) for the patient to call back and schedule the following:    Appointment type: Bariatric Surgeon  Appointment mode: In Person or Virtual Visit  Provider: Dr. Mina Bright  Return date: Approx. 9/3/25  Specialty phone number: [phone]    Additional Notes: 3 month Meet and Juve

## 2025-06-12 NOTE — TELEPHONE ENCOUNTER
Patient called back  Patient confirmed scheduled appointment:     Date: 6/13/25  Time: 11am  Visit type: new andra nurtition group class  Visit mode: Virtual Visit  Provider:  Tara Molina  Location: [facility]    Additional Notes:     Patient confirmed scheduled appointment:     Date: 6/17/25  Time: 10am  Visit type: RN andra group class  Visit mode: Virtual Visit  Provider:  Summer Bowden  Location: [facility]    Additional Notes:       Patient confirmed scheduled appointment:     Date: 7/15/25  Time: 10:30 AM  Visit type: New Bariatric Nutrition  Visit mode: Virtual Visit  Provider:  Tara Molina  Location: [facility]    Additional Notes:     Patient confirmed scheduled appointment:     Date: 8/5/25  Time: 10 AM  Visit type: Return MTM  Visit mode: Virtual Visit  Provider:  Liliana Cervantes RPH  Location: [facility]    Additional Notes:     Patient confirmed scheduled appointment:     Date: 8/29/25  Time: 12Noon  Visit type: Return Bariatric presurgery  Visit mode: Virtual Visit  Provider:  Vicenta Ward PA-C  Location: [facility]    Additional Notes:     Patient confirmed scheduled appointment:     Date: 9/5/25  Time: 11AM  Visit type: Bariatric Surgeon  Visit mode: Virtual Visit  Provider:  Dr. Mina Bright  Location: [facility]    Additional Notes:

## 2025-06-17 ENCOUNTER — VIRTUAL VISIT (OUTPATIENT)
Dept: ENDOCRINOLOGY | Facility: CLINIC | Age: 25
End: 2025-06-17
Payer: COMMERCIAL

## 2025-06-17 DIAGNOSIS — E66.1 CLASS 3 DRUG-INDUCED OBESITY WITH SERIOUS COMORBIDITY AND BODY MASS INDEX (BMI) OF 45.0 TO 49.9 IN ADULT (H): Primary | ICD-10-CM

## 2025-06-17 DIAGNOSIS — E66.813 CLASS 3 DRUG-INDUCED OBESITY WITH SERIOUS COMORBIDITY AND BODY MASS INDEX (BMI) OF 45.0 TO 49.9 IN ADULT (H): Primary | ICD-10-CM

## 2025-06-17 PROCEDURE — 99207 PR NO CHARGE NURSE ONLY: CPT | Mod: 95

## 2025-06-17 NOTE — PATIENT INSTRUCTIONS
Regina Marshall,    Thank you for taking time to attend the New Bariatric class.  Below are items to be mindful of as you work through the process as you wait for your surgery date.    In-Clinic Weight:  If you have not had an official in-clinic weight, please call [phone] to schedule a nurse visit for an in-clinic weight.  If you live a distance from the Clinic and Surgery Center, you can have the weight done with your PCP.  On the day of your weight, weigh at home in the same clothes you will weigh in at the clinic.  That way we can get a better correlation of the scales.    Labs: if you have not had your initial labs done yet, please schedule an appointment with a [place] lab.  If you need to have your labs faxed to an outside lab, contact our office.    Clearances: work on getting scheduled for your Psych clearance and contact your other providers for other clearances/letters of support that are needed.  As you get your clearances, you are welcome to send a [employer] message and we will keep an eye out for the reports/letters.  Our fax is: [phone].  Electronic copies can always be uploaded into [employer] and sent as an attachment to a [employer] message.    Dietician visits: attend your dietician visits monthly.  Missed appointments can delay your surgery date.  All patients are required to attend monthly dietician visits until their surgery.    Nutrition/Intake Modification:  Decrease portion sizes.  Read labels and portion out food according to the portion on the container.    Take time to read the nutrition information for recipes.  You will be surprised where hidden carbohydrates can be.  Make sure you are getting in at least 60 grams of protein daily - roughly 20 grams per meal.  Decrease/eliminate carbs in the form of chips, crackers, pasta, rice, pancakes, waffles, bagels, and white potatoes.  Replace with healthy vegetables.  Increase water intake to at least 64 ounces each day.  Take at least 30  minutes to eat your meal.  Be mindful of your meal when eating.  Chew your food well.  Savor the flavors and textures and be mindful of your mood.  Switch to caffeine and carbonation free beverages.    Mental Health:  Take a look at your past and current relationship with food.  Work with a therapist/psychiatrist/counselor/psychologist to discuss your mental health as it relates to food and food intake.  Attend a support group.    Activity:  Get active!  It is important to be mobile after surgery.  It helps with weight loss, healthy muscles & bones, and decreases chances of blood clots.  Start slow and have small goals.  If you currently have mobility issues, start with chair-based exercises and work your way up to standing exercises.    8. Support Group Information  Please click the following link for Support Group Information and Times: [email or web address]    9.  Your Task List:    Bariatric Task List    Fax:   Please fax all paperwork to: [phone] -      Status:   Is patient a candidate for bariatric surgery?:    -    Cleared to schedule surgeon consult?:    -    Status:    -    Surgeon: Dr. Bright -    Tentative surgery month/year: TBD -      Insurance:  Insurance:  [facility] -    Contact insurance to discuss coverage: Needed -      [employer]: PCP Recommendation and Medical Clearance:    -    HP Referral:    -    Advanced beneficiary notification (ABN) for Medicare patients for RD visits   and surgery:   -    Weight history:   -    Other:    -      Patient Info:  Initial Weight:  270 -    Date of Initial Weight/Height:  5/31/2024 - Initial NMWM visit with Vicenta Ward PA-C  Goal Weight (lbs):  260 -    Required Weight Loss:  10lb -    Surgery Type:  sleeve gastrectomy -    Multidisciplinary Meeting:    -      Dietician Visits:  Structured weight loss required by insurance?:    -    Dietician Visit 1:  Needed -    Dietician Visit 2:  Needed -    Dietician  "Visit 3:  Needed -    Dietician Visit 4:    -    Dietician Visit 5:    -    Dietician Visit 6:    -    Dietician Visit additional:  Needed - Monthly until surgery - AS  Clearance from dietician to see surgeon?:    -    Dietician Notes:  Past RD Visits: 7/2/24, 7/31/24, 8/30/24 -        Psychological Evaluation:  Psych eval:  Needed - List and letter sent to pt 6/5/25 - AS  Therapist letter of support:    -    Psychiatrist letter of support:  Needed - Letter sent to pt 6/5/25 - AS  Establish care with therapist:    -    Complete eating disorder evaluation:    -    Letter of clearance from therapist/eating disorder program:      -    Other:    -      Lab Work:  Complete Blood Count:  Needed - Ordered 6/3/25 - AS  Comprehensive Metabolic Panel:  Needed - Ordered 6/3/25 - AS  Vitamin D:  Needed - Ordered 6/3/25 - AS  PTH:  Needed - Ordered 6/3/25 - AS  Hgb A1c:  Needed - Ordered 6/3/25 - AS   Lipids: Needed - Ordered 6/3/25 - AS   TSH ([facility], [facility], [facility]): Needed - Ordered 6/3/25 - AS  Vitamin A: Needed - Ordered 6/3/25 - AS  Vitamin B12: Needed - Ordered 6/3/25 - AS    Testing:  UGI:    -    EGD:  Needed - hx of GERD  Sleep Study:   -    Other:   -    Other:    -      PCP:  Establish care with PCP:  Completed -    Follow up with PCP:    -    PCP letter of support:  Needed - Letter sent to pt 6/5/25 - AS    Stopping Smoking/ Alcohol Use/Cannabis Use:  Quit tobacco use (3 months smoke free)?:  Needed -    Quit date:    -    Quit alcohol use:   -    Quit date:   -    Other:   -    Quit date:   -      Patient Education:   Information Session:  Needed -    Attended New Consult Class?: Completed - 6/17/25 - AS  Given \"Making your decision\" handout?:  Yes -    Given \"A Roadmap to you Weight Loss Surgery\" handout?: Yes -    Given \"Epic Care Companion\" information?: Yes -    Attended support group?:  Needed -    Support plan in place?:  Needed -    Research consents signed?:    -    Avoid NSAIDS/ Alternate Plan for Pain:   -  "     Additional Surgery:   Review Coag plan:    -     HgA1c <8:    -      Requirements:  Inpatient pain consult:    -    Final nicotine screen:    -  needed  Dental work complete:    -    Birth control plan:    -    Gallstone prevention plan (Actigall for 6 months postop):   -    Other:   -    Other:   -      Final Tasks:   Before surgery online preop class:  Needed -    After surgery online class:  Needed -    Nurse visit for information:  Needed -    Weight Check: Needed -    History and Physical: Pre-Assessment Clinic (PAC) -   Needed -    Final labs per clinic: Needed - Ordered at PAC visit. bks  See MTM Pharmacist for medication review and plan for after surgery (if DM, transplant hx, greater than 10 meds): Needed -    Chest xray per clinic: Needed -    Electrocardiogram (ECG) per clinic:   -    Schedule postop appointments: Needed -    Other: See ANGEL and MD within 6 months of OR date. - Needed -      Notes:    -          Please feel free to reach out if you have any questions.    Sincerely,    Summer Alvarado RN, BSN  RN Care Coordinator  General and Bariatric Surgery   Comprehensive Weight Management    Phone: [phone]  Fax: [phone]  [phone]
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
## 2025-06-18 ENCOUNTER — TELEPHONE (OUTPATIENT)
Dept: FAMILY MEDICINE | Facility: CLINIC | Age: 25
End: 2025-06-18
Payer: COMMERCIAL

## 2025-06-18 NOTE — TELEPHONE ENCOUNTER
Patient Quality Outreach    Patient is due for the following:   Physical Preventive Adult Physical    Action(s) Taken:   Schedule a Adult Preventative    Type of outreach:    Sent [employer] message.    Questions for provider review:    None         Geno Coats CMA  Chart routed to None.

## 2025-06-30 ENCOUNTER — CARE COORDINATION (OUTPATIENT)
Dept: ENDOCRINOLOGY | Facility: CLINIC | Age: 25
End: 2025-06-30
Payer: COMMERCIAL

## 2025-06-30 NOTE — PROGRESS NOTES
Tasklist updated and sent to patient via [employer]â„¢.    Bariatric Task List    Fax:   Please fax all paperwork to: [phone]      Status:   Is patient a candidate for bariatric surgery?:  patient is a candidate for bariatric surgery    Cleared to schedule surgeon consult?:  cleared to schedule surgeon consult 9/5/25 appt. bks  Status:  surgery evaluation in process    Surgeon: Dr. Bright    Tentative surgery month/year: TBD      Insurance:  Insurance:  [facility]    Contact insurance to discuss coverage: Needed           Patient Info:  Initial Weight:  270    Date of Initial Weight/Height:  5/31/2024 Initial [facility] visit with Vicenta Ward PA-C  Goal Weight (lbs):  260    Required Weight Loss:  10lb    Surgery Type:  sleeve gastrectomy         Dietician Visits:  Structured weight loss required by insurance?:       Dietician Visit 1:  Completed 6/13/25 Nutrition Class. [facility]  Dietician Visit 2:  Needed 7/15/25 appt. s  Dietician Visit 3:  Needed    Dietician Visit 4:       Dietician Visit 5:       Dietician Visit 6:       Dietician Visit additional:  Needed Monthly until surgery - AS  Clearance from dietician to see surgeon?:       Dietician Notes:  Past RD Visits: 7/2/24, 7/31/24, 8/30/24        Psychological Evaluation:  Psych eval:  Needed List and letter sent to pt 6/5/25 - AS   Psychiatrist letter of support:  Needed Letter sent to pt 6/5/25 - AS    Lab Work:  Complete Blood Count:  Needed Ordered 6/3/25 - AS  Comprehensive Metabolic Panel:  Needed Ordered 6/3/25 - AS  Vitamin D:  Needed Ordered 6/3/25 - AS  PTH:  Needed Ordered 6/3/25 - AS  Hgb A1c:  Needed Ordered 6/3/25 - AS  Lipids: Needed Ordered 6/3/25 - AS  TSH ([facility], [facility], [place] [facility]): Needed Ordered 6/3/25 - AS   Vitamin A: Needed Ordered 6/3/25 - AS  Vitamin B12: Needed Ordered 6/3/25 - AS  Nicotine test to be done one month after quitting:        Testing:  EGD:  Needed hx of GERD    Endoscopy Instructions:   Call the Endoscopy Department at [phone] to  "schedule an endoscopy to be done by your surgeon.    PCP:  Establish care with PCP:  Completed    Follow up with PCP:       PCP letter of support:  Needed Letter sent to pt 6/5/25 - AS; Guerline Young DNP, appt 8/13/25. irene      Stopping Smoking/ Alcohol Use/Cannabis Use:  Quit tobacco use (3 months smoke free)?:  Needed    Quit date:    Let us know your quit date. irene      Patient Education:   Information Session:  Needed View at [email or web address]  Attended New Consult Class?: Completed 6/17/25 - AS  Given \"Making your decision\" handout?:  Yes    Given \"A Roadmap to you Weight Loss Surgery\" handout?: Yes    Given \"Epic Care Companion\" information?: Yes    Attended support group?:  Needed    Support plan in place?:  Needed         Final Tasks:   Before surgery online preop class:  Needed    Nurse visit for information:  Needed    Weight Check: Needed    History and Physical: Pre-Assessment Clinic (PAC)   Needed    Final labs per clinic: Needed Ordered at PAC visit. bks  See MTM Pharmacist for medication review and plan for after surgery (if DM, transplant hx, greater than 10 meds): Needed    Chest xray per clinic: Needed    Electrocardiogram (ECG) per clinic:      Schedule postop appointments: Needed    Other: See JAMILAH and MD within 6 months of OR date. - Needed      Notes:  Please register for the Weight Loss Surgery Care Companion Pathway through the [employer] mobile jamilah or via web browser. You register by answering \"yes\" to the following question, \"Do you agree to take part in this free, online program?\"  Information from this Pathway can help you be more successful before surgery and for up to one year after surgery.  This Pathway through [employer] can also answer questions you may have about your surgery.   The Pathway will start when you get your Tasklist after your initial consultation or when your surgery is scheduled.  Your Pathway is activated. meserets           "

## 2025-07-08 ENCOUNTER — LAB (OUTPATIENT)
Dept: LAB | Facility: CLINIC | Age: 25
End: 2025-07-08
Payer: COMMERCIAL

## 2025-07-08 DIAGNOSIS — E66.1 CLASS 3 DRUG-INDUCED OBESITY WITH SERIOUS COMORBIDITY AND BODY MASS INDEX (BMI) OF 45.0 TO 49.9 IN ADULT (H): ICD-10-CM

## 2025-07-08 DIAGNOSIS — E66.813 CLASS 3 DRUG-INDUCED OBESITY WITH SERIOUS COMORBIDITY AND BODY MASS INDEX (BMI) OF 45.0 TO 49.9 IN ADULT (H): ICD-10-CM

## 2025-07-08 DIAGNOSIS — Z11.3 SCREENING FOR STDS (SEXUALLY TRANSMITTED DISEASES): ICD-10-CM

## 2025-07-08 LAB
ALBUMIN SERPL BCG-MCNC: 4 G/DL (ref 3.5–5.2)
ALP SERPL-CCNC: 71 U/L (ref 40–150)
ALT SERPL W P-5'-P-CCNC: 9 U/L (ref 0–50)
ANION GAP SERPL CALCULATED.3IONS-SCNC: 11 MMOL/L (ref 7–15)
AST SERPL W P-5'-P-CCNC: 18 U/L (ref 0–45)
BILIRUB SERPL-MCNC: 0.3 MG/DL
BUN SERPL-MCNC: 5.7 MG/DL (ref 6–20)
CALCIUM SERPL-MCNC: 9.3 MG/DL (ref 8.8–10.4)
CHLORIDE SERPL-SCNC: 105 MMOL/L (ref 98–107)
CHOLEST SERPL-MCNC: 158 MG/DL
CREAT SERPL-MCNC: 0.84 MG/DL (ref 0.51–0.95)
EGFRCR SERPLBLD CKD-EPI 2021: >90 ML/MIN/1.73M2
ERYTHROCYTE [DISTWIDTH] IN BLOOD BY AUTOMATED COUNT: 12.3 % (ref 10–15)
EST. AVERAGE GLUCOSE BLD GHB EST-MCNC: 94 MG/DL
FASTING STATUS PATIENT QL REPORTED: NO
FASTING STATUS PATIENT QL REPORTED: NO
GLUCOSE SERPL-MCNC: 95 MG/DL (ref 70–99)
HBA1C MFR BLD: 4.9 %
HCO3 SERPL-SCNC: 23 MMOL/L (ref 22–29)
HCT VFR BLD AUTO: 37.3 % (ref 35–47)
HDLC SERPL-MCNC: 66 MG/DL
HGB BLD-MCNC: 12.7 G/DL (ref 11.7–15.7)
LDLC SERPL CALC-MCNC: 79 MG/DL
MCH RBC QN AUTO: 30 PG (ref 26.5–33)
MCHC RBC AUTO-ENTMCNC: 34 G/DL (ref 31.5–36.5)
MCV RBC AUTO: 88 FL (ref 78–100)
NONHDLC SERPL-MCNC: 92 MG/DL
PLATELET # BLD AUTO: 219 10E3/UL (ref 150–450)
POTASSIUM SERPL-SCNC: 3.7 MMOL/L (ref 3.4–5.3)
PROT SERPL-MCNC: 7.3 G/DL (ref 6.4–8.3)
RBC # BLD AUTO: 4.23 10E6/UL (ref 3.8–5.2)
SODIUM SERPL-SCNC: 139 MMOL/L (ref 135–145)
TRIGL SERPL-MCNC: 65 MG/DL
VIT B12 SERPL-MCNC: 498 PG/ML (ref 232–1245)
VIT D+METAB SERPL-MCNC: 53 NG/ML (ref 20–50)
WBC # BLD AUTO: 6 10E3/UL (ref 4–11)

## 2025-07-08 PROCEDURE — 83970 ASSAY OF PARATHORMONE: CPT

## 2025-07-08 PROCEDURE — 87491 CHLMYD TRACH DNA AMP PROBE: CPT

## 2025-07-08 PROCEDURE — 3044F HG A1C LEVEL LT 7.0%: CPT

## 2025-07-08 PROCEDURE — 3048F LDL-C <100 MG/DL: CPT

## 2025-07-08 PROCEDURE — 83036 HEMOGLOBIN GLYCOSYLATED A1C: CPT

## 2025-07-08 PROCEDURE — 82306 VITAMIN D 25 HYDROXY: CPT

## 2025-07-08 PROCEDURE — 99000 SPECIMEN HANDLING OFFICE-LAB: CPT

## 2025-07-08 PROCEDURE — 80053 COMPREHEN METABOLIC PANEL: CPT

## 2025-07-08 PROCEDURE — 80061 LIPID PANEL: CPT

## 2025-07-08 PROCEDURE — 87591 N.GONORRHOEAE DNA AMP PROB: CPT

## 2025-07-08 PROCEDURE — 36415 COLL VENOUS BLD VENIPUNCTURE: CPT

## 2025-07-08 PROCEDURE — 84590 ASSAY OF VITAMIN A: CPT | Mod: 90

## 2025-07-08 PROCEDURE — 85027 COMPLETE CBC AUTOMATED: CPT

## 2025-07-08 PROCEDURE — 82607 VITAMIN B-12: CPT

## 2025-07-09 ENCOUNTER — LAB (OUTPATIENT)
Dept: LAB | Facility: CLINIC | Age: 25
End: 2025-07-09
Payer: COMMERCIAL

## 2025-07-09 DIAGNOSIS — Z11.3 SCREENING FOR STDS (SEXUALLY TRANSMITTED DISEASES): Primary | ICD-10-CM

## 2025-07-09 LAB
C TRACH DNA SPEC QL PROBE+SIG AMP: NEGATIVE
N GONORRHOEA DNA SPEC QL NAA+PROBE: NEGATIVE
PTH-INTACT SERPL-MCNC: 30 PG/ML (ref 15–65)
SPECIMEN TYPE: NORMAL

## 2025-07-13 ENCOUNTER — OFFICE VISIT (OUTPATIENT)
Dept: URGENT CARE | Facility: URGENT CARE | Age: 25
End: 2025-07-13
Payer: COMMERCIAL

## 2025-07-13 ENCOUNTER — MYC REFILL (OUTPATIENT)
Dept: FAMILY MEDICINE | Facility: CLINIC | Age: 25
End: 2025-07-13

## 2025-07-13 VITALS
TEMPERATURE: 97.8 F | HEART RATE: 113 BPM | SYSTOLIC BLOOD PRESSURE: 113 MMHG | WEIGHT: 213 LBS | OXYGEN SATURATION: 98 % | BODY MASS INDEX: 37.73 KG/M2 | DIASTOLIC BLOOD PRESSURE: 69 MMHG | RESPIRATION RATE: 18 BRPM

## 2025-07-13 DIAGNOSIS — L81.8 TATTOO: Primary | ICD-10-CM

## 2025-07-13 DIAGNOSIS — R11.2 NAUSEA AND VOMITING, UNSPECIFIED VOMITING TYPE: ICD-10-CM

## 2025-07-13 DIAGNOSIS — Z91.89 AT RISK FOR INFECTION: ICD-10-CM

## 2025-07-13 LAB
ANNOTATION COMMENT IMP: NORMAL
RETINYL PALMITATE SERPL-MCNC: <0.02 MG/L
VIT A SERPL-MCNC: 0.43 MG/L

## 2025-07-13 PROCEDURE — 3078F DIAST BP <80 MM HG: CPT

## 2025-07-13 PROCEDURE — 99213 OFFICE O/P EST LOW 20 MIN: CPT

## 2025-07-13 PROCEDURE — 3074F SYST BP LT 130 MM HG: CPT

## 2025-07-13 RX ORDER — CEPHALEXIN 500 MG/1
500 CAPSULE ORAL 2 TIMES DAILY
Qty: 14 CAPSULE | Refills: 0 | Status: SHIPPED | OUTPATIENT
Start: 2025-07-13 | End: 2025-07-20

## 2025-07-13 NOTE — PATIENT INSTRUCTIONS
Possibly early infection    Lets start antibiotics to prevent any worsening    If no improvement, likely irritation and bruising    Side effects of Antibiotic use:   - Stomach upset (nausea and vomiting) - take with food    - consider a probiotic to replace good bacteria in GI system (OTC probiotic pills or foods like yogurt with 'live cultures')   - Diarrhea    - antibiotics cause eradication of normal 'gut' noy causing diarrhea and       put you at risk of developing something called C.diff infection (infection of the large intestine)   - Takes 3-6 months to build back normal micro-biome and risk incomplete restoration   - fungal infections   - with eradication of good microorganisms, antibiotics can also increase risk of developing a    fungal infection        by disrupting the normal microbiome in many mucosal membranes areas      often seen in the mouth = thrush, or the vagina= yeast infection  - Skin reactions - red spotty rash that is not itchy or 'swollen' - common, not a true allergy    Can develop skin sloughing or true allergic reaction        - A true allergic reaction:   These are consistent with:   swelling in the face, mouth, throat,   having difficulty breathing  Skin reactions, including hives and itching, and flushed or pale skin.   Low blood pressure   Constriction of your airways and a swollen tongue or throat, which can cause   wheezing and trouble breathing  A weak and rapid pulse,   nausea, vomiting  dizziness or fainting  - antibiotic medications reduces the thickness of colonic mucus, increasing risk of pathogen invasion and intestinal inflammation and increased permeability  = leaky gut syndrome    - taking antibiotics or not finishing the dose can promote bacterial resistance, or growth of bacterial Super-bugs          After the tattoo is applied, petroleum jelly or a similar ointment is applied to the skin to prevent oozing of serosanguineous fluid.   The artist bandages the tattoo and  should provide aftercare instructions, including recommending that the client cleanse the site twice daily with an antimicrobial soap and avoid contact with the site except for cleaning.     Tattoos take approximately two weeks to heal. During this time, the risk of infection is increased.   We advise patients to avoid swimming, soaking in water, and directing shower jets onto the site of the tattoo.   We also advise them to avoid sun exposure, to use sunscreen if sun exposure cannot be avoided, and to wear loose clothing that will not stick to the tattoo site    Risk of infection From Tattoos:   An infected tattoo can be serious.   The area around your tattoo may be   painful, swollen, red, and hot.   You may see red streaks or pus at the tattoo site.   You may have a fever. Or you may have swollen or tender lymph nodes.  It's important to take good care of your infection at home so it doesn't get worse.      Monitor:     You lose feeling in the area near the tattoo, or it feels numb or tingly.     The skin near the tattoo turns pale or cool.     The tattoo starts to bleed, and blood soaks through the bandage. Oozing small amounts of blood is normal.  Signs of infection: red, hot, purulent drainage

## 2025-07-13 NOTE — PROGRESS NOTES
URGENT CARE  Assessment & Plan   Assessment:   Regina Marshall is a 25 year old female who's clinical presentation today is consistent with:   1. Tattoo    2. At risk for infection  - cephALEXin (KEFLEX) 500 MG capsule;  Plan:  Possible early infection vs normal tattoo dermal irritation, but given the erythema near the tattoo will cover with antibiotics as a precaution; Follow aftercare instructions from , keep tattoo clean with soapy water, avoid swimming pools, lakes, dirty water, and sun exposure. Monitor for worsening symptoms such as fever, bleeding, pus drainage, hot spreading erythema.  Risks and side effects of antibiotics discussed: Nausea, vomiting, upset stomach, diarrhea, secondary fungal infections, skin reactions like worsening rash. Serious reactions include swelling of the face, mouth, or throat, difficulty breathing, or hives with itching.  Additionally we discussed if symptoms do not improve after starting today's treatment to follow up in 3-5 days, sooner if symptoms worsen, return precautions given  No alarm signs or symptoms present   Differential Diagnoses for this patient's chief complaint that I considered include: cellulitis, Erysipelas, abscess, sepsis, Atopic dermatitis, allergic Dermatitis, contact dermatitis, Insect bite/sting, drug reaction,  Necrotizing fasciitis, thrombophlebitis, DVT, Gout, Lyme,      Pam Singh, [facility] [place]      ______________________________________________________________________      Subjective     HPI: Regina Marshall  is a 25 year old  female who presents today for evaluation the following concerns:   ROXANNA Marshall reports she got a tattoo 2 days ago, which has become a bit red surrounding and looks worse than initially. Experiences pain and burning sensation, especially when moving. Redness started the night of the tattoo application, initially appearing as an outline/halo and worsening by the following  night. Reports itchiness but no fever. Noticed the tattoo appeared more purplish-red when at home, possibly due to bruising or irritation. Tried to keep the tattoo out of the sun.  No crusting, no purulence     Review of Systems:  Pertinent review of systems as reflected in HPI, otherwise negative.     Objective    Physical Exam:  Vitals:    07/13/25 1108   BP: 113/69   Pulse: 113   Resp: 18   Temp: 97.8  F (36.6  C)   TempSrc: Tympanic   SpO2: 98%   Weight: 96.6 kg (213 lb)      General:   alert and oriented, no acute distress, non ill-appearing   Vital signs reviewed: afebrile and normotensive     SKIN:   Left arm   noted very faint macular erythema with indistinct borders, slightly warmth noted (warmer than body temp) , mild tenderness to palpation , but no edema noted.  No abscesses seen, no fluctuance noted, no drainage appreciated, no bullae or  vesicles.          ______________________________________________________________________    I explained my diagnostic considerations and recommendations to the patient  All questions were answered.   Please see AVS for any patient instructions & handouts given.

## 2025-07-13 NOTE — PROGRESS NOTES
Urgent Care Clinic Visit    Chief Complaint   Patient presents with    Infection     Left forearm, got a tattoo a couple days ago, is burning and itching. Says it is very sore.                7/13/2025    11:10 AM   Additional Questions   Roomed by Polina LEPE

## 2025-07-14 RX ORDER — ONDANSETRON 8 MG/1
8 TABLET, FILM COATED ORAL EVERY 8 HOURS PRN
Qty: 30 TABLET | Refills: 1 | Status: SHIPPED | OUTPATIENT
Start: 2025-07-14

## 2025-07-15 NOTE — PROGRESS NOTES
"Video-Visit Details    Type of service:  Video Visit    Video Start Time: 1:31 PM   Video End Time: 1:53 PM     Originating Location (pt. Location): Home    Distant Location (provider location):  Offsite (providers home)     Platform used for Video Visit: [facility] Bariatric Nutrition     Reason For Visit: Nutrition Consultation     Regina Marshall is a 25 year old presenting today for virtual bariatric nutrition consultation. Patient is interested in laparoscopic sleeve gastrectomy with Dr. Bright expected surgery TBD.  Patient is accompanied by self.  This is patient's 2nd of 3 required nutrition visits prior to surgery. Completed Weight Loss Surgery Nutrition Class on June 13, 2025.    Pt referred by TONEY Voss on Breana 3, 2025.    CO-MORBIDITIES OF OBESITY INCLUDE:        5/30/2025     1:59 PM   --   I have the following health issues associated with obesity None of the above       SUPPORT:      5/30/2025     1:59 PM   Support System Reviewed With Patient   Who do you have in your support network that can be available to help you for the first 2 weeks after surgery? Staff at my Group Home   Who can you count on for support throughout your weight loss surgery journey? [facility] and staff     ANTHROPOMETRICS:  Weight 5/21/24: 270 lbs    Estimated body mass index is 37.91 kg/m  as calculated from the following:    Height as of this encounter: 1.6 m (5' 3\").    Weight as of this encounter: 97.1 kg (214 lb).    Current weight: 214 lbs  Goal weight for surgery is 260 lb - Met      MEDICATIONS FOR WEIGHT LOSS:  Wegovy 2.4 mg - going well.     Supplements - none         5/30/2025     1:59 PM   --   I have tried the following methods to lose weight Watching portions or calories    Prescription Medications           5/30/2025     1:59 PM   Weight Loss Questions Reviewed With Patient   How long have you been overweight? Since puberty     NUTRITION HISTORY:     Eating less portions at meals. Does eat lunch and " dinner most days but often will skip breakfast due to sleeping in late.     Hydration - sometimes will drink coffee, Celsius packets with caffeine mixed with water, soda/juice every so often. Drinks her fluids from a straw.     Patient attended virtual S nutrition class June 2025 via Zoom.     Additional information:  FT -  at [employer]         5/30/2025     1:59 PM   Recall Diet Questions Reviewed With Patient   Describe what you typically consume for breakfast (typical or most recent) Nothing   Describe what you typically consume for lunch (typical or most recent) Nothing   Describe what you typically consume for supper (typical or most recent) Protein   Describe what you typically consume as snacks (typical or most recent) Sweets   How many ounces of water, or other low calorie drinks, do you drink daily (8 oz=1 glass)? 8 oz   How many ounces of caffeine (coffee, tea, pop) do you drink daily (8 oz=1 glass)? 24 oz   How many ounces of carbonated (pop, beer, sparkling water) drinks do you drinky daily (8 oz=1 glass)? 16 oz   How many ounces of juice, pop, sweet tea, sports drinks, protein drinks, other sweetened drinks, do you drink daily (8 oz=1 glass)? 24 oz   How many ounces of milk do you drink daily (8 oz=1 glass) 8 oz   Please indicate the type of milk whole    2%    1%   How often do you drink alcohol? Monthly or less   If you do drink alcohol, how many drinks might you have in a day? (one drink = 5 oz. wine, 1 can/bottle of beer, 1 shot liquor) 1 or 2           5/30/2025     1:59 PM   Eating Habits   What foods do you crave? Sugary drinks and carbs           5/30/2025     1:59 PM   Dining Out History Reviewed With Patient   How often do you dine out? Rarely.   Where do you dine out? (select all that apply) fast food chains    take out   What types of food do you order when you dine out? Burgers           5/30/2025     1:59 PM   Physical Activity Reviewed With Patient   How often do you  exercise? 1 to 2 times per week   What is the duration of your exercise (in minutes)? 15 Minutes   What types of exercise do you do? walking    climbing stairs at work   What keeps you from being more active? Pain    I should be more active but I just have not gotten around to it    Shortness of breath    Too tired    Worried people will look at me     EXERCISE: has been walking work         5/30/2025     1:59 PM   --   How often do you exercise? 1 to 2 times per week   What is the duration of your exercise (in minutes)? 15 Minutes   What types of exercise do you do? walking    climbing stairs at work   What keeps you from being more active? Pain    I should be more active but I just have not gotten around to it    Shortness of breath    Too tired    Worried people will look at me       NUTRITION DIAGNOSIS:  Obesity r/t long history of positive energy balance aeb BMI >30 kg/m2.    INTERVENTION:  Intervention Provided/Education Provided on post-op diet guidelines, vitamins/minerals essential post-operatively, GI anatomy of bariatric surgeries, ways to help prepare for post-op diet guidelines pre-operatively, portion/calorie-control, mindful eating and sources of protein.  Patient demonstrates understanding.     Personal barriers to making and continuing required life changes have been identified, and strategies to overcome those barriers have been recommended AND family and social supports have been assessed and strategies to strengthen those supports have been recommended.    Provided pt with list of goals, RD contact information and resources listed below via [employer].           5/30/2025     1:59 PM   Questions Reviewed With Patient   How ready are you to make changes regarding your weight? Number 1 = Not ready at all to make changes up to 10 = very ready. 10   How confident are you that you can change? 1 = Not confident that you will be successful making changes up to 10 = very confident. 7       Expected  Engagement: good    GOALS:  Specific to today:  1) Work on reducing caffeine intake by 25-50%   2) Increase protein in your diet. Aim for 60-90 grams daily.     Relating To Eating:  - Eat slowly (20-30 minutes per meal), chewing foods well (25 chews per bite/applesauce consistency)  - Focus on eating smaller portion sizes at meals and snacks    Relating to beverages:  - Reduce caffeine/carbonation/calorie containing beverages  - Separate fluids from meals by 30 minutes before, during, and after eating  - Drink 48-64 ounces of fluid per day. Small, frequent sips between meals.    Relating to activity:  Increase activity as able    Protein Sources for Weight Loss  [email or web address]     Carbohydrates  [email or web address]     Mindful Eating  [email or web address]     Post-op Diet Handouts:  Diet Guidelines after Weight-loss Surgery  [email or web address]     Your Stage 1 Diet: Clear Liquids  [email or web address]     Your Stage 2 Diet: Low-fat Full Liquids  [email or web address]     Your Stage 3 Diet: Pureed Foods  [email or web address]     Pureed Recipes  [email or web address]    Your Stage 4 Diet: Soft Foods  [email or web address]    Your Stage 5 Diet: Regular Foods  [email or web address]    Supplements after Sleeve Gastrectomy, Gastric Bypass or Single Anastomosis Duodenal Switch  [email or web address]    Keeping Track of Fluids  [email or web address]      Handouts Relating to Exercise :    1.     Learning About Being Physically Active     2.      Muscle Conditionin Exercises    3.     Resistance Training with Free Weights: 3 Exercises    4.      Resistance Training with Surgical Tubin Exercises    5.     Stretchin Exercises    6.     Seated Exercises for Arms and Legs: 11 Exercises       Follow up: August 15.     Time spent with patient: 21 minutes.    Mariama Hernandez RD, LD

## 2025-07-16 ENCOUNTER — OFFICE VISIT (OUTPATIENT)
Dept: URGENT CARE | Facility: URGENT CARE | Age: 25
End: 2025-07-16
Payer: COMMERCIAL

## 2025-07-16 ENCOUNTER — VIRTUAL VISIT (OUTPATIENT)
Dept: ENDOCRINOLOGY | Facility: CLINIC | Age: 25
End: 2025-07-16
Payer: COMMERCIAL

## 2025-07-16 VITALS
TEMPERATURE: 98.3 F | SYSTOLIC BLOOD PRESSURE: 104 MMHG | BODY MASS INDEX: 37.92 KG/M2 | HEART RATE: 81 BPM | OXYGEN SATURATION: 100 % | DIASTOLIC BLOOD PRESSURE: 67 MMHG | WEIGHT: 214 LBS | HEIGHT: 63 IN | RESPIRATION RATE: 14 BRPM

## 2025-07-16 VITALS — HEIGHT: 63 IN | WEIGHT: 214 LBS | BODY MASS INDEX: 37.92 KG/M2

## 2025-07-16 DIAGNOSIS — L81.8 DISORDER OF SKIN DUE TO TATTOO INK: ICD-10-CM

## 2025-07-16 DIAGNOSIS — L98.9 DISORDER OF SKIN DUE TO TATTOO INK: ICD-10-CM

## 2025-07-16 DIAGNOSIS — Z71.3 NUTRITIONAL COUNSELING: Primary | ICD-10-CM

## 2025-07-16 DIAGNOSIS — E66.813 CLASS 3 DRUG-INDUCED OBESITY WITH SERIOUS COMORBIDITY AND BODY MASS INDEX (BMI) OF 45.0 TO 49.9 IN ADULT (H): ICD-10-CM

## 2025-07-16 DIAGNOSIS — E66.1 CLASS 3 DRUG-INDUCED OBESITY WITH SERIOUS COMORBIDITY AND BODY MASS INDEX (BMI) OF 45.0 TO 49.9 IN ADULT (H): ICD-10-CM

## 2025-07-16 DIAGNOSIS — L92.3 TATTOO REACTION: Primary | ICD-10-CM

## 2025-07-16 LAB
BASOPHILS # BLD AUTO: 0 10E3/UL (ref 0–0.2)
BASOPHILS NFR BLD AUTO: 0 %
EOSINOPHIL # BLD AUTO: 0.1 10E3/UL (ref 0–0.7)
EOSINOPHIL NFR BLD AUTO: 3 %
ERYTHROCYTE [DISTWIDTH] IN BLOOD BY AUTOMATED COUNT: 12.5 % (ref 10–15)
HCT VFR BLD AUTO: 40 % (ref 35–47)
HGB BLD-MCNC: 13.3 G/DL (ref 11.7–15.7)
IMM GRANULOCYTES # BLD: 0 10E3/UL
IMM GRANULOCYTES NFR BLD: 0 %
LYMPHOCYTES # BLD AUTO: 2.6 10E3/UL (ref 0.8–5.3)
LYMPHOCYTES NFR BLD AUTO: 53 %
MCH RBC QN AUTO: 29.6 PG (ref 26.5–33)
MCHC RBC AUTO-ENTMCNC: 33.3 G/DL (ref 31.5–36.5)
MCV RBC AUTO: 89 FL (ref 78–100)
MONOCYTES # BLD AUTO: 0.2 10E3/UL (ref 0–1.3)
MONOCYTES NFR BLD AUTO: 5 %
NEUTROPHILS # BLD AUTO: 2 10E3/UL (ref 1.6–8.3)
NEUTROPHILS NFR BLD AUTO: 40 %
PLATELET # BLD AUTO: 232 10E3/UL (ref 150–450)
RBC # BLD AUTO: 4.49 10E6/UL (ref 3.8–5.2)
WBC # BLD AUTO: 5 10E3/UL (ref 4–11)

## 2025-07-16 PROCEDURE — 99214 OFFICE O/P EST MOD 30 MIN: CPT

## 2025-07-16 PROCEDURE — 1125F AMNT PAIN NOTED PAIN PRSNT: CPT | Mod: 95

## 2025-07-16 PROCEDURE — 36415 COLL VENOUS BLD VENIPUNCTURE: CPT

## 2025-07-16 PROCEDURE — 99207 PR NO CHARGE LOS: CPT | Mod: 95

## 2025-07-16 PROCEDURE — 3074F SYST BP LT 130 MM HG: CPT

## 2025-07-16 PROCEDURE — 85025 COMPLETE CBC W/AUTO DIFF WBC: CPT

## 2025-07-16 PROCEDURE — 97802 MEDICAL NUTRITION INDIV IN: CPT | Mod: 95

## 2025-07-16 PROCEDURE — 3078F DIAST BP <80 MM HG: CPT

## 2025-07-16 RX ORDER — TRIAMCINOLONE ACETONIDE 1 MG/G
OINTMENT TOPICAL 2 TIMES DAILY
Qty: 60 G | Refills: 0 | Status: SHIPPED | OUTPATIENT
Start: 2025-07-16

## 2025-07-16 ASSESSMENT — PAIN SCALES - GENERAL: PAINLEVEL_OUTOF10: MODERATE PAIN (6)

## 2025-07-16 NOTE — PROGRESS NOTES
Urgent Care Clinic Visit    Chief Complaint   Patient presents with    Infection     7 days ago pt got a tattoo on her left fore arm, it has become infected. Pt seen in UC a couple days ago and given Keflex, still taking.  Pt thinks the infection is worse and abx not working.               7/16/2025     5:31 PM   Additional Questions   Roomed by Kady BRAR

## 2025-07-16 NOTE — PATIENT INSTRUCTIONS
Diagnosis: tattoo reaction / allergy     Plan:   Try the steroid cream to help with the itching   Keep it clean to prevent secondary bacterial infections     Monitor for:   Pus drainage   Fevers   Spreading redness to the surrounding skin     A tattoo reaction refers to any cutaneous or systemic adverse response that occurs as a result of tattoo pigment or the tattooing process.     Common manifestations include acute inflammatory responses (erythema, swelling, pain), allergic contact dermatitis, itching, loss if color, open areas, areas of rejection of the dye     Allergic and hypersensitivity reactions are particularly associated with certain pigments, especially red inks, and may develop weeks to years after tattoo placement.

## 2025-07-16 NOTE — LETTER
"7/16/2025       RE: Regina Marshall  [address] [facility] [address]     Dear Colleague,    Thank you for referring your patient, Regina Marshall, to the [facility]. Please see a copy of my visit note below.    Video-Visit Details    Type of service:  Video Visit    Video Start Time: 1:31 PM   Video End Time: 1:53 PM     Originating Location (pt. Location): Home    Distant Location (provider location):  Offsite (providers home)     Platform used for Video Visit: [facility]    Return Bariatric Nutrition     Reason For Visit: Nutrition Consultation     Regina Marshall is a 25 year old presenting today for virtual bariatric nutrition consultation. Patient is interested in laparoscopic sleeve gastrectomy with Dr. Bright expected surgery TBD.  Patient is accompanied by self.  This is patient's 2nd of 3 required nutrition visits prior to surgery. Completed Weight Loss Surgery Nutrition Class on June 13, 2025.    Pt referred by TONEY Voss on Breana 3, 2025.    CO-MORBIDITIES OF OBESITY INCLUDE:        5/30/2025     1:59 PM   --   I have the following health issues associated with obesity None of the above       SUPPORT:      5/30/2025     1:59 PM   Support System Reviewed With Patient   Who do you have in your support network that can be available to help you for the first 2 weeks after surgery? Staff at my Group Home   Who can you count on for support throughout your weight loss surgery journey? Erna and staff     ANTHROPOMETRICS:  Weight 5/21/24: 270 lbs    Estimated body mass index is 37.91 kg/m  as calculated from the following:    Height as of this encounter: 1.6 m (5' 3\").    Weight as of this encounter: 97.1 kg (214 lb).    Current weight: 214 lbs  Goal weight for surgery is 260 lb - Met      MEDICATIONS FOR WEIGHT LOSS:  Wegovy 2.4 mg - going well.     Supplements - none         5/30/2025     " 1:59 PM   --   I have tried the following methods to lose weight Watching portions or calories    Prescription Medications           5/30/2025     1:59 PM   Weight Loss Questions Reviewed With Patient   How long have you been overweight? Since puberty     NUTRITION HISTORY:     Eating less portions at meals. Does eat lunch and dinner most days but often will skip breakfast due to sleeping in late.     Hydration - sometimes will drink coffee, Celsius packets with caffeine mixed with water, soda/juice every so often. Drinks her fluids from a straw.     Patient attended virtual S nutrition class June 2025 via Zoom.     Additional information:  FT -  at [employer]         5/30/2025     1:59 PM   Recall Diet Questions Reviewed With Patient   Describe what you typically consume for breakfast (typical or most recent) Nothing   Describe what you typically consume for lunch (typical or most recent) Nothing   Describe what you typically consume for supper (typical or most recent) Protein   Describe what you typically consume as snacks (typical or most recent) Sweets   How many ounces of water, or other low calorie drinks, do you drink daily (8 oz=1 glass)? 8 oz   How many ounces of caffeine (coffee, tea, pop) do you drink daily (8 oz=1 glass)? 24 oz   How many ounces of carbonated (pop, beer, sparkling water) drinks do you drinky daily (8 oz=1 glass)? 16 oz   How many ounces of juice, pop, sweet tea, sports drinks, protein drinks, other sweetened drinks, do you drink daily (8 oz=1 glass)? 24 oz   How many ounces of milk do you drink daily (8 oz=1 glass) 8 oz   Please indicate the type of milk whole    2%    1%   How often do you drink alcohol? Monthly or less   If you do drink alcohol, how many drinks might you have in a day? (one drink = 5 oz. wine, 1 can/bottle of beer, 1 shot liquor) 1 or 2           5/30/2025     1:59 PM   Eating Habits   What foods do you crave? Sugary drinks and carbs           5/30/2025      1:59 PM   Dining Out History Reviewed With Patient   How often do you dine out? Rarely.   Where do you dine out? (select all that apply) fast food chains    take out   What types of food do you order when you dine out? Burgers           5/30/2025     1:59 PM   Physical Activity Reviewed With Patient   How often do you exercise? 1 to 2 times per week   What is the duration of your exercise (in minutes)? 15 Minutes   What types of exercise do you do? walking    climbing stairs at work   What keeps you from being more active? Pain    I should be more active but I just have not gotten around to it    Shortness of breath    Too tired    Worried people will look at me     EXERCISE: has been walking work         5/30/2025     1:59 PM   --   How often do you exercise? 1 to 2 times per week   What is the duration of your exercise (in minutes)? 15 Minutes   What types of exercise do you do? walking    climbing stairs at work   What keeps you from being more active? Pain    I should be more active but I just have not gotten around to it    Shortness of breath    Too tired    Worried people will look at me       NUTRITION DIAGNOSIS:  Obesity r/t long history of positive energy balance aeb BMI >30 kg/m2.    INTERVENTION:  Intervention Provided/Education Provided on post-op diet guidelines, vitamins/minerals essential post-operatively, GI anatomy of bariatric surgeries, ways to help prepare for post-op diet guidelines pre-operatively, portion/calorie-control, mindful eating and sources of protein.  Patient demonstrates understanding.     Personal barriers to making and continuing required life changes have been identified, and strategies to overcome those barriers have been recommended AND family and social supports have been assessed and strategies to strengthen those supports have been recommended.    Provided pt with list of goals, RD contact information and resources listed below via [employer].           5/30/2025     1:59  PM   Questions Reviewed With Patient   How ready are you to make changes regarding your weight? Number 1 = Not ready at all to make changes up to 10 = very ready. 10   How confident are you that you can change? 1 = Not confident that you will be successful making changes up to 10 = very confident. 7       Expected Engagement: good    GOALS:  Specific to today:  1) Work on reducing caffeine intake by 25-50%   2) Increase protein in your diet. Aim for 60-90 grams daily.     Relating To Eating:  - Eat slowly (20-30 minutes per meal), chewing foods well (25 chews per bite/applesauce consistency)  - Focus on eating smaller portion sizes at meals and snacks    Relating to beverages:  - Reduce caffeine/carbonation/calorie containing beverages  - Separate fluids from meals by 30 minutes before, during, and after eating  - Drink 48-64 ounces of fluid per day. Small, frequent sips between meals.    Relating to activity:  Increase activity as able    Protein Sources for Weight Loss  [email or web address]     Carbohydrates  [email or web address]     Mindful Eating  [email or web address]     Post-op Diet Handouts:  Diet Guidelines after Weight-loss Surgery  [email or web address]     Your Stage 1 Diet: Clear Liquids  [email or web address]     Your Stage 2 Diet: Low-fat Full Liquids  [email or web address]     Your Stage 3 Diet: Pureed Foods  [email or web address]     Pureed Recipes  [email or web address]    Your Stage 4 Diet: Soft Foods  [email or web address]    Your Stage 5 Diet: Regular Foods  [email or web address]    Supplements after Sleeve Gastrectomy, Gastric Bypass or Single Anastomosis Duodenal Switch  [email or web address]    Keeping Track of Fluids  [email or web address]      Handouts Relating to Exercise :    1.     Learning About Being Physically Active     2.      Muscle Conditionin Exercises    3.     Resistance  Training with Free Weights: 3 Exercises    4.      Resistance Training with Surgical Tubin Exercises    5.     Stretchin Exercises    6.     Seated Exercises for Arms and Legs: 11 Exercises       Follow up: August 15.     Time spent with patient: 21 minutes.    Mariama Hernandez RD, LD            Again, thank you for allowing me to participate in the care of your patient.      Sincerely,    Mariama Hernandez RD

## 2025-07-16 NOTE — PATIENT INSTRUCTIONS
Ernesto MCKNIGHT,     It was nice to meet you today.     Follow-up with RD on August 15.     Thank you,    Mariama Hernandez, SHELLEY, LD  If you would like to schedule or reschedule an appointment with the RD, please call [phone]    Nutrition Goals  GOALS:  Specific to today:  1) Work on reducing caffeine intake by 25-50%   2) Increase protein in your diet. Aim for 60-90 grams daily.     Relating To Eating:  - Eat slowly (20-30 minutes per meal), chewing foods well (25 chews per bite/applesauce consistency)  - Focus on eating smaller portion sizes at meals and snacks    Relating to beverages:  - Reduce caffeine/carbonation/calorie containing beverages  - Separate fluids from meals by 30 minutes before, during, and after eating  - Drink 48-64 ounces of fluid per day. Small, frequent sips between meals.    Relating to activity:  Increase activity as able    Protein Sources for Weight Loss  [email or web address]     Carbohydrates  [email or web address]     Mindful Eating  [email or web address]     Post-op Diet Handouts:  Diet Guidelines after Weight-loss Surgery  [email or web address]     Your Stage 1 Diet: Clear Liquids  [email or web address]     Your Stage 2 Diet: Low-fat Full Liquids  [email or web address]     Your Stage 3 Diet: Pureed Foods  [email or web address]     Pureed Recipes  [email or web address]    Your Stage 4 Diet: Soft Foods  [email or web address]    Your Stage 5 Diet: Regular Foods  [email or web address]    Supplements after Sleeve Gastrectomy, Gastric Bypass or Single Anastomosis Duodenal Switch  [email or web address]    Keeping Track of Fluids  [email or web address]      Handouts Relating to Exercise :    1.     Learning About Being Physically Active     2.      Muscle Conditionin Exercises    3.     Resistance Training with Free Weights: 3 Exercises    4.      Resistance Training with Surgical Tubin  Exercises    5.     Stretchin Exercises    6.     Seated Exercises for Arms and Legs: 11 Exercises       COMPREHENSIVE WEIGHT MANAGEMENT PROGRAM  VIRTUAL SUPPORT GROUPS    At [facility], our Comprehensive Weight Management program offers on-line support groups for patients who are working on weight loss and considering, preparing for, or have had weight loss surgery.     There is no cost for this opportunity.  You are invited to attend the?Virtual Support Groups?provided by any of the following locations:    [facility] via Microsoft Teams with Ariela Siddiqi RD, RN  2.   [facility] via MediaV with Faustino Martinez, PhD, LP  3.   [facility] via MediaV with Pam Curry RN  4.   [facility] via a Zoom Meeting with AWA Bland    The following Support Group information can also be found on our website:  [email or web address]      [facility]   WEIGHT LOSS SURGERY SUPPORT GROUP  The support group is a patient-lead forum that meets monthly to share experiences, encouragement and education. It is open to those who have had weight loss surgery, are scheduled for surgery, or are considering surgery.   WHEN:  of each month from 5:00PM - 6:00PM using Microsoft Teams.   FACILITATOR: Led by Ariela Garcia RD, JOSEPH, RN, the program's Clinical Coordinator.   TO REGISTER: Please contact the clinic via betNOW or call the nurse line directly at [phone] to inform our staff that you would like an invite sent to you and to let us know the email you would like the invite sent to. Prior to the meeting, a link with directions on how to join the meeting will be sent to you.     Meetings, , 5:00pm - 6:00pm    January 15: Let's Talk  : Let's Talk  : Speaker: Aron Lopez RD, JOSEPH  : Let's Talk  May 21: Speaker: Eileen Chen RD, LD  : Let's  Talk  July 16: Let's Talk  August 20: Let's Talk  September 17: No meeting.  October 15: Speaker: Melonie Tyler PsyD, JAMEL  November 19: Let's Talk  December 17: Let's Talk    [facility] BARIATRIC CARE SUPPORT GROUP  This is open to all pre- and post- operative bariatric surgery patients as well as their support system.   WHEN: 3rd Tuesday of each month from 6:30 PM - 8:00 PM using Microsoft Teams.   FACILITATOR: Led by Faustino Martinez, Ph.D who is a Licensed Psychologist with the [facility] Comprehensive Weight Management Program.   TO REGISTER: Please send an email to Faustino Martinez, Ph.D., LP at?[email or web address]?if you would like an invitation to the group. Prior to the meeting, a link with directions on how to join the meeting will be sent to you.    2025 Meetings, 3rd Tuesday January 21st: Open Forum  February 18th: Medications and Bariatric Surgery  Speaker: Janee Zheng [facility] Resident  March 18th: Open Forum  April 15th: Genetics of Obesity as well as Q&A, Speaker: Carol Garcia MD, [facility] Comprehensive Weight Management Program.  May 20th: Open Forum  June 17th: Nutritional Labeling, Speaker: ANAYA  July 15th: Open Forum  August 19th: Open Forum  September 16th: Open Forum  October 21st: Open Forum  November 18th: Holiday Eating, Speaker: ANAYA  December 16th: Open Forum    [facility] POST-OPERATIVE BARIATRIC SURGERY SUPPORT GROUP  This is a support group for [facility] bariatric patients (and those external to [facility]) who have had bariatric surgery and are at least 3 months post-surgery.  WHEN: 4th Thursday of the month from 11:00 AM - 12:00 PM using Microsoft Teams.   FACILITATOR: Led by Certified Bariatric Nurse, Pam Curry RN, CBN.   TO REGISTER: Please send an email to Pam at [email or web address] if you would like an invitation to  the group.  Prior to the meeting, a link with directions on how to join the meeting will be sent to you.    2025 Meetings, 4th Thursday, 11:00am - 12:pm  January 23  February 27  March 27  April 24  May 22  Breana 26  July 24  August 28  September 25  October 23  November 27: Thanksgiving Day, No meeting.      December 25: Roxie Day, No meeting.        [facility]   HEALTHY LIFESTYLE COACHING GROUP  This is a 60 minute virtual coaching group for those who want to lead a healthier lifestyle. Come together to set goals and overcome barriers in a supportive group environment. We will address the four pillars of health: nutrition, exercise, sleep, and emotional well-being.   WHEN: 1st Friday of the month, 12:30 PM - 1:30 PM   using a Zoom meeting.     FACILITATOR: Led by National Board-Certified Health and , Pam Miles, [facility].  TO REGISTER: Please call the ibeatyou at [phone] to register. You will get an appointment to attend in [place]. Fifteen minutes prior to the meeting, complete the e-check in and you will get the link to join the meeting.  There is no charge to attend this group and space is limited.  Please register for each month you wish to attend    2025 Meetings, 1st Friday, 12:30pm-1:30pm  January 3  February 7  March 7: No meeting.  April 4  May 2  Breana 6  July 4: Fourth of July Holiday, No meeting.    August 1  September 5  October 3  November 7  December 5

## 2025-07-16 NOTE — NURSING NOTE
Is the patient currently in the state of [place]? yes    Location: home    Visit mode:VIDEO    If the visit is dropped, the patient can be reconnected by: VIDEO VISIT: Text to cell phone:   Telephone Information:   Mobile [phone]       Will anyone else be joining the visit? NO  (If patient encounters technical issues they should call [phone]    Are changes needed to the allergy or medication list? No    Are refills needed on medications prescribed by this physician? NO    Reason for visit: Consult      Beckie Spencer, Virtual Visit Facilitator    QNR Status: NA

## 2025-07-16 NOTE — PROGRESS NOTES
URGENT CARE  Assessment & Plan   Assessment:   Regina Marshall is a 25 year old female who's clinical presentation today is consistent with:   1. Tattoo reaction    - Adult Dermatology  Referral; Future  - triamcinolone (KENALOG) 0.1 % external ointment;    2. Disorder of skin due to tattoo ink  - CBC with platelets and differential   Plan:  Patient presents again for a second visit after recently been treated for a tattoo allergy/ skin reaction, originally at her first visit, there were no signs of an infection but she was at risk for cellulitis and so Keflex was sent as a 'watch and wait approach', however patient did start taking the antibiotics, patient presents today having finished the antibiotics but notes that the tattoo ink appears to be sloughing off, she has underlying open areas underneath the tattoo, and a lot of itching, no cellulitis changes present, no spreading redness, no fevers   Additionally the CBC was reassuring today, discussed with her this is not consistent with a cellulitis but looks more like a tattoo dye/ink allergy reaction (see image below) to the red ink of the tattoo, recommend she try topical steroid cream, keep the area clean and dry to prevent any secondary bacterial skin infections,   can follow-up with dermatology as needed, referral placed, present sooner if symptoms worsen, return precautions given.  No alarm signs or symptoms present   Differential Diagnoses for this patient's chief complaint that I considered include:  Erysipelas, abscess, sepsis, Atopic dermatitis, allergic Dermatitis, contact dermatitis, Necrotizing fasciitis,       30 minutes spent by me (on the date of the encounter) doing chart review, history, physical exam, documentation, diagnostic testing, education/counseling and further activities per the note, not including procedures     MARIAM Duffy [facility]  "BRANCH      ______________________________________________________________________      Subjective     HPI: Regina Marshall \"Z\" is a 25 year old  female who presents today for evaluation the following concerns:   ROXANNA Marshall reports experiencing a reaction to tattoo ink, which she describes as a rejection of the ink. She notes that the redness initially present when she was seen, 3 days ago,  has improved but then was different in certain areas.   The reaction is characterized by significant itchiness at the tattoo site. She has not been able to contact the tattoo parlor as they have been closed and unresponsive to phone calls. The tattoo was done in [place]. She mentions that the reaction might be related to the red dye used in the tattoo. ROXANNA Marshall reports no fevers, no spreading redness no heat to the site     Review of Systems:  Pertinent review of systems as reflected in HPI, otherwise negative.     Objective    Physical Exam:  Vitals:    07/16/25 1732   BP: 104/67   Pulse: 81   Resp: 14   Temp: 98.3  F (36.8  C)   TempSrc: Tympanic   SpO2: 100%   Weight: 97.1 kg (214 lb)   Height: 1.594 m (5' 2.75\")      General:   alert and oriented, no acute distress, no ill-appearing   Vital signs reviewed: afebrile and normotensive   SKIN:   See image below;   Tattoo ink rejection/ sloughing   No surrounding erythema or edema, no warmth felt, no purulence noted  Open areas where tattoo sloughed off / was rejected         The tattoo 3 days ago on 7/13   The tattoo today 7/16           LABS:   Results for orders placed or performed in visit on 07/16/25   CBC with platelets and differential     Status: None   Result Value Ref Range    WBC Count 5.0 4.0 - 11.0 10e3/uL    RBC Count 4.49 3.80 - 5.20 10e6/uL    Hemoglobin 13.3 11.7 - 15.7 g/dL    Hematocrit 40.0 35.0 - 47.0 %    MCV 89 78 - 100 fL    MCH 29.6 26.5 - 33.0 pg    MCHC 33.3 31.5 - 36.5 g/dL    RDW 12.5 10.0 - 15.0 %    Platelet Count 232 150 - 450 10e3/uL    % " Neutrophils 40 %    % Lymphocytes 53 %    % Monocytes 5 %    % Eosinophils 3 %    % Basophils 0 %    % Immature Granulocytes 0 %    Absolute Neutrophils 2.0 1.6 - 8.3 10e3/uL    Absolute Lymphocytes 2.6 0.8 - 5.3 10e3/uL    Absolute Monocytes 0.2 0.0 - 1.3 10e3/uL    Absolute Eosinophils 0.1 0.0 - 0.7 10e3/uL    Absolute Basophils 0.0 0.0 - 0.2 10e3/uL    Absolute Immature Granulocytes 0.0 <=0.4 10e3/uL   CBC with platelets and differential     Status: None    Narrative    The following orders were created for panel order CBC with platelets and differential.  Procedure                               Abnormality         Status                     ---------                               -----------         ------                     CBC with platelets and ...[[id number]]                      Final result                 Please view results for these tests on the individual orders.        ______________________________________________________________________    I explained my diagnostic considerations and recommendations to the patient  All questions were answered.   Please see AVS for any patient instructions & handouts given.

## 2025-07-17 ENCOUNTER — PATIENT OUTREACH (OUTPATIENT)
Dept: CARE COORDINATION | Facility: CLINIC | Age: 25
End: 2025-07-17
Payer: COMMERCIAL

## 2025-07-18 ENCOUNTER — HOSPITAL ENCOUNTER (OUTPATIENT)
Facility: CLINIC | Age: 25
End: 2025-07-18
Attending: SURGERY | Admitting: SURGERY
Payer: COMMERCIAL

## 2025-07-20 ENCOUNTER — RESULTS FOLLOW-UP (OUTPATIENT)
Dept: ENDOCRINOLOGY | Facility: CLINIC | Age: 25
End: 2025-07-20
Payer: COMMERCIAL

## 2025-07-20 DIAGNOSIS — E66.1 CLASS 3 DRUG-INDUCED OBESITY WITH SERIOUS COMORBIDITY AND BODY MASS INDEX (BMI) OF 45.0 TO 49.9 IN ADULT (H): Primary | ICD-10-CM

## 2025-07-20 DIAGNOSIS — E66.813 CLASS 3 DRUG-INDUCED OBESITY WITH SERIOUS COMORBIDITY AND BODY MASS INDEX (BMI) OF 45.0 TO 49.9 IN ADULT (H): Primary | ICD-10-CM

## 2025-07-23 ENCOUNTER — TELEPHONE (OUTPATIENT)
Dept: ENDOCRINOLOGY | Facility: CLINIC | Age: 25
End: 2025-07-23

## 2025-07-24 ENCOUNTER — TELEPHONE (OUTPATIENT)
Dept: GASTROENTEROLOGY | Facility: CLINIC | Age: 25
End: 2025-07-24
Payer: COMMERCIAL

## 2025-07-24 NOTE — TELEPHONE ENCOUNTER
Pre visit planning completed.    Procedure details:    Patient scheduled for Upper endoscopy (EGD) on 08/13/2025.     Arrival time: 1300. Procedure time 1400    Facility location: [facility]; [address], [address], [place] [address]. Check in location: Main entrance at registration desk.  *Disclaimer: Drivers are to check in with patient and stay on campus during procedure.     Sedation type: Conscious sedation     Pre op exam needed? No.    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 No    Recent diagnosis of diverticulitis within the last 6 weeks? No    Medication review:    Diabetic? No    Anticoagulants? No    Weight loss medication/injectable? Yes. Semaglutide-Weight Management (WEGOVY). Weekly dosing of medication.  HOLD 7 days before procedure.  Follow up with managing provider.   Tirzepatide-Weight Management (Zepbound). Weekly dosing of medication.  HOLD 7 days before procedure.  Follow up with managing provider.     Other medication HOLDING recommendations:  N/A    Prep for procedure:     Bowel prep recommendation: N/A  Due to: EGD    Procedure information and instructions sent via [employer]       Gretchen Dominguez RN  Endoscopy Procedure Pre Assessment   [phone] option 3

## 2025-07-31 ENCOUNTER — TELEPHONE (OUTPATIENT)
Dept: GASTROENTEROLOGY | Facility: CLINIC | Age: 25
End: 2025-07-31
Payer: COMMERCIAL

## 2025-07-31 NOTE — TELEPHONE ENCOUNTER
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    Did you cancel or rescheduled an EUS procedure? No.    Is screening questionnaire older than 3 months from the reschedule date.   If Yes, please complete screening questionnaire. No    Prior to reschedule please review:  Ordering Provider: AMILCAR OGDEN  Sedation Determined: MODERATE  Does patient have any ASC Exclusions, please identify?: YES, BMI    Notes on Cancelled Procedure:  Procedure: Upper Endoscopy [EGD]   Date: 08/13/2025  Location: [facility]; [address] [place] [address], [address] Floor, [place] [address]   Surgeon: DR. FLORIAN    Rescheduled: No, PATIENT DECLINED RESCHEDULING

## 2025-08-05 ENCOUNTER — VIRTUAL VISIT (OUTPATIENT)
Dept: PHARMACY | Facility: CLINIC | Age: 25
End: 2025-08-05
Payer: COMMERCIAL

## 2025-08-05 VITALS — WEIGHT: 212 LBS | BODY MASS INDEX: 37.85 KG/M2

## 2025-08-05 DIAGNOSIS — E66.813 CLASS 3 DRUG-INDUCED OBESITY WITH SERIOUS COMORBIDITY AND BODY MASS INDEX (BMI) OF 45.0 TO 49.9 IN ADULT (H): ICD-10-CM

## 2025-08-05 DIAGNOSIS — E66.1 CLASS 3 DRUG-INDUCED OBESITY WITH SERIOUS COMORBIDITY AND BODY MASS INDEX (BMI) OF 45.0 TO 49.9 IN ADULT (H): ICD-10-CM

## 2025-08-05 ASSESSMENT — PAIN SCALES - GENERAL: PAINLEVEL_OUTOF10: NO PAIN (0)

## 2025-08-10 SDOH — HEALTH STABILITY: PHYSICAL HEALTH: ON AVERAGE, HOW MANY DAYS PER WEEK DO YOU ENGAGE IN MODERATE TO STRENUOUS EXERCISE (LIKE A BRISK WALK)?: 2 DAYS

## 2025-08-10 SDOH — HEALTH STABILITY: PHYSICAL HEALTH: ON AVERAGE, HOW MANY MINUTES DO YOU ENGAGE IN EXERCISE AT THIS LEVEL?: 10 MIN

## 2025-08-10 ASSESSMENT — ANXIETY QUESTIONNAIRES
8. IF YOU CHECKED OFF ANY PROBLEMS, HOW DIFFICULT HAVE THESE MADE IT FOR YOU TO DO YOUR WORK, TAKE CARE OF THINGS AT HOME, OR GET ALONG WITH OTHER PEOPLE?: NOT DIFFICULT AT ALL
GAD7 TOTAL SCORE: 1
4. TROUBLE RELAXING: NOT AT ALL
IF YOU CHECKED OFF ANY PROBLEMS ON THIS QUESTIONNAIRE, HOW DIFFICULT HAVE THESE PROBLEMS MADE IT FOR YOU TO DO YOUR WORK, TAKE CARE OF THINGS AT HOME, OR GET ALONG WITH OTHER PEOPLE: NOT DIFFICULT AT ALL
2. NOT BEING ABLE TO STOP OR CONTROL WORRYING: NOT AT ALL
3. WORRYING TOO MUCH ABOUT DIFFERENT THINGS: NOT AT ALL
5. BEING SO RESTLESS THAT IT IS HARD TO SIT STILL: NOT AT ALL
7. FEELING AFRAID AS IF SOMETHING AWFUL MIGHT HAPPEN: NOT AT ALL
GAD7 TOTAL SCORE: 1
7. FEELING AFRAID AS IF SOMETHING AWFUL MIGHT HAPPEN: NOT AT ALL
GAD7 TOTAL SCORE: 1
1. FEELING NERVOUS, ANXIOUS, OR ON EDGE: NOT AT ALL
6. BECOMING EASILY ANNOYED OR IRRITABLE: SEVERAL DAYS

## 2025-08-10 ASSESSMENT — PATIENT HEALTH QUESTIONNAIRE - PHQ9
SUM OF ALL RESPONSES TO PHQ QUESTIONS 1-9: 12
10. IF YOU CHECKED OFF ANY PROBLEMS, HOW DIFFICULT HAVE THESE PROBLEMS MADE IT FOR YOU TO DO YOUR WORK, TAKE CARE OF THINGS AT HOME, OR GET ALONG WITH OTHER PEOPLE: SOMEWHAT DIFFICULT
SUM OF ALL RESPONSES TO PHQ QUESTIONS 1-9: 12

## 2025-08-10 ASSESSMENT — SOCIAL DETERMINANTS OF HEALTH (SDOH): HOW OFTEN DO YOU GET TOGETHER WITH FRIENDS OR RELATIVES?: NEVER

## 2025-08-11 ENCOUNTER — OFFICE VISIT (OUTPATIENT)
Dept: FAMILY MEDICINE | Facility: CLINIC | Age: 25
End: 2025-08-11
Attending: NURSE PRACTITIONER
Payer: COMMERCIAL

## 2025-08-11 VITALS
SYSTOLIC BLOOD PRESSURE: 104 MMHG | WEIGHT: 212 LBS | OXYGEN SATURATION: 100 % | TEMPERATURE: 98.5 F | RESPIRATION RATE: 12 BRPM | HEIGHT: 63 IN | HEART RATE: 93 BPM | DIASTOLIC BLOOD PRESSURE: 74 MMHG | BODY MASS INDEX: 37.56 KG/M2

## 2025-08-11 DIAGNOSIS — J30.1 SEASONAL ALLERGIC RHINITIS DUE TO POLLEN: ICD-10-CM

## 2025-08-11 DIAGNOSIS — Z00.00 ROUTINE GENERAL MEDICAL EXAMINATION AT A HEALTH CARE FACILITY: Primary | ICD-10-CM

## 2025-08-11 PROCEDURE — 3078F DIAST BP <80 MM HG: CPT | Performed by: NURSE PRACTITIONER

## 2025-08-11 PROCEDURE — 99395 PREV VISIT EST AGE 18-39: CPT | Mod: 25 | Performed by: NURSE PRACTITIONER

## 2025-08-11 PROCEDURE — 90471 IMMUNIZATION ADMIN: CPT | Performed by: NURSE PRACTITIONER

## 2025-08-11 PROCEDURE — 3074F SYST BP LT 130 MM HG: CPT | Performed by: NURSE PRACTITIONER

## 2025-08-11 PROCEDURE — 1126F AMNT PAIN NOTED NONE PRSNT: CPT | Performed by: NURSE PRACTITIONER

## 2025-08-11 PROCEDURE — 90620 MENB-4C VACCINE IM: CPT | Performed by: NURSE PRACTITIONER

## 2025-08-11 PROCEDURE — 99213 OFFICE O/P EST LOW 20 MIN: CPT | Mod: 25 | Performed by: NURSE PRACTITIONER

## 2025-08-11 RX ORDER — PREDNISONE 5 MG/1
60 TABLET ORAL
Qty: 90 TABLET | Refills: 3 | Status: SHIPPED | OUTPATIENT
Start: 2025-08-11

## 2025-08-11 ASSESSMENT — PAIN SCALES - GENERAL: PAINLEVEL_OUTOF10: NO PAIN (0)
